# Patient Record
Sex: FEMALE | Race: WHITE | NOT HISPANIC OR LATINO | Employment: OTHER | URBAN - METROPOLITAN AREA
[De-identification: names, ages, dates, MRNs, and addresses within clinical notes are randomized per-mention and may not be internally consistent; named-entity substitution may affect disease eponyms.]

---

## 2017-01-26 DIAGNOSIS — K64.9 BLEEDING HEMORRHOID: ICD-10-CM

## 2017-01-26 DIAGNOSIS — G43.009 MIGRAINE WITHOUT AURA AND WITHOUT STATUS MIGRAINOSUS, NOT INTRACTABLE: ICD-10-CM

## 2017-01-26 DIAGNOSIS — E89.0 POSTOPERATIVE HYPOTHYROIDISM: ICD-10-CM

## 2017-01-26 DIAGNOSIS — N95.2 VAGINAL ATROPHY: ICD-10-CM

## 2017-01-26 DIAGNOSIS — E78.5 HYPERLIPIDEMIA, UNSPECIFIED HYPERLIPIDEMIA TYPE: ICD-10-CM

## 2017-01-26 RX ORDER — LEVOTHYROXINE SODIUM 0.07 MG/1
75 TABLET ORAL
Qty: 90 TAB | Refills: 3 | Status: SHIPPED | OUTPATIENT
Start: 2017-01-26 | End: 2017-12-08 | Stop reason: SDUPTHER

## 2017-01-26 RX ORDER — SIMVASTATIN 20 MG
20 TABLET ORAL EVERY EVENING
Qty: 90 TAB | Refills: 3 | Status: SHIPPED | OUTPATIENT
Start: 2017-01-26 | End: 2017-08-16 | Stop reason: SDUPTHER

## 2017-01-26 RX ORDER — SUMATRIPTAN 50 MG/1
50 TABLET, FILM COATED ORAL
Qty: 10 TAB | Refills: 3 | Status: SHIPPED | OUTPATIENT
Start: 2017-01-26 | End: 2018-01-24 | Stop reason: SDUPTHER

## 2017-01-26 RX ORDER — ESTRADIOL 0.1 MG/G
1 CREAM VAGINAL
Qty: 42.5 G | Refills: 1 | Status: SHIPPED | OUTPATIENT
Start: 2017-01-26 | End: 2018-01-24

## 2017-03-02 ENCOUNTER — OFFICE VISIT (OUTPATIENT)
Dept: MEDICAL GROUP | Age: 67
End: 2017-03-02
Payer: COMMERCIAL

## 2017-03-02 VITALS
OXYGEN SATURATION: 93 % | SYSTOLIC BLOOD PRESSURE: 108 MMHG | BODY MASS INDEX: 23.57 KG/M2 | TEMPERATURE: 97.8 F | HEART RATE: 71 BPM | HEIGHT: 63 IN | DIASTOLIC BLOOD PRESSURE: 60 MMHG | WEIGHT: 133 LBS

## 2017-03-02 DIAGNOSIS — G43.009 MIGRAINE WITHOUT AURA AND WITHOUT STATUS MIGRAINOSUS, NOT INTRACTABLE: ICD-10-CM

## 2017-03-02 DIAGNOSIS — Z23 NEED FOR PNEUMOCOCCAL VACCINATION: ICD-10-CM

## 2017-03-02 DIAGNOSIS — E89.0 POSTOPERATIVE HYPOTHYROIDISM: ICD-10-CM

## 2017-03-02 DIAGNOSIS — E78.00 PURE HYPERCHOLESTEROLEMIA: ICD-10-CM

## 2017-03-02 DIAGNOSIS — K64.9 BLEEDING HEMORRHOID: ICD-10-CM

## 2017-03-02 DIAGNOSIS — F33.42 RECURRENT MAJOR DEPRESSIVE DISORDER, IN FULL REMISSION (HCC): ICD-10-CM

## 2017-03-02 PROCEDURE — 90670 PCV13 VACCINE IM: CPT | Performed by: INTERNAL MEDICINE

## 2017-03-02 PROCEDURE — 99214 OFFICE O/P EST MOD 30 MIN: CPT | Mod: 25 | Performed by: INTERNAL MEDICINE

## 2017-03-02 PROCEDURE — 90471 IMMUNIZATION ADMIN: CPT | Performed by: INTERNAL MEDICINE

## 2017-03-02 ASSESSMENT — PAIN SCALES - GENERAL: PAINLEVEL: NO PAIN

## 2017-03-02 NOTE — MR AVS SNAPSHOT
"        Radha Land   3/2/2017 4:20 PM   Office Visit   MRN: [id number]    Department:  [address] [place] [address]   Dept Phone:  [phone]    Description:  Female : 1950   Provider:  Clarisse Toro M.D.           Reason for Visit     Follow-Up lab review from pap      Allergies as of 3/2/2017     No Known Allergies      You were diagnosed with     Postoperative hypothyroidism   [475935]       Pure hypercholesterolemia   [272.0.ICD-9-CM]       Recurrent major depressive disorder, in full remission (CMS-[facility])   [2396497]       Migraine without aura and without status migrainosus, not intractable   [817694]       Need for pneumococcal vaccination   [806828]         Vital Signs     Blood Pressure Pulse Temperature Height Weight Body Mass Index    108/60 mmHg 71 36.6 °C (97.8 °F) 1.6 m (5' 3\") 60.328 kg (133 lb) 23.57 kg/m2    Oxygen Saturation Smoking Status                93% Former Smoker          Basic Information     Date Of Birth Sex Race Ethnicity Preferred Language    1950 Female White Unknown English      Your appointments     2017  9:20 AM   Established Patient with Clarisse Toro M.D.   [address] [address] [phone]           You will be receiving a confirmation call a few days before your appointment from our automated call confirmation system.              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   2014 - Present    Migraine without aura and without status migrainosus, not intractable G43.009   2015 - Present    Osteopenia M85.80   2015 - Present    Bleeding hemorrhoid K64.9   3/1/2016 - Present    Recurrent major depressive disorder, in full remission (CMS-HCC) F33.42   2016 - Present      Health Maintenance       " Date Due Completion Dates    IMM ZOSTER VACCINE 1/10/2009 ---    IMM PNEUMOCOCCAL 65+ (ADULT) LOW/MEDIUM RISK SERIES (2 of 2 - PCV13) 9/2/2015 9/2/2014    COLONOSCOPY 11/15/2017 11/15/2007    MAMMOGRAM 12/2/2017 12/2/2016, 11/11/2015, 10/7/2014, 7/30/2013, 7/27/2012, 7/18/2011, 7/14/2010, 7/14/2010, 4/14/2009, 4/14/2009, 4/10/2008, 4/10/2008, 9/12/2007, 9/12/2007, 3/14/2007, 3/5/2007, 2/16/2006    BONE DENSITY 11/11/2020 11/11/2015, 7/14/2010    IMM DTaP/Tdap/Td Vaccine (2 - Td) 9/2/2024 9/2/2014            Current Immunizations     13-VALENT PCV PREVNAR  Incomplete    Influenza Vaccine Adult HD 11/11/2015    Influenza Vaccine Quad Inj (Pf) 11/20/2016    Pneumococcal polysaccharide vaccine (PPSV-23) 9/2/2014    Tdap Vaccine 9/2/2014      Below and/or attached are the medications your provider expects you to take. Review all of your home medications and newly ordered medications with your provider and/or pharmacist. Follow medication instructions as directed by your provider and/or pharmacist. Please keep your medication list with you and share with your provider. Update the information when medications are discontinued, doses are changed, or new medications (including over-the-counter products) are added; and carry medication information at all times in the event of emergency situations     Allergies:  No Known Allergies          Medications  Valid as of: March 02, 2017 -  5:17 PM    Generic Name Brand Name Tablet Size Instructions for use    Ascorbic Acid (Tab) ascorbic acid 500 MG Take 1,000 mg by mouth every day.        Calcium Carb-Cholecalciferol   Take  by mouth every day.        Cholecalciferol (Tab) cholecalciferol 1000 UNIT Take 2,000 Units by mouth every day.        Coenzyme Q10-Levocarnitine   Take  by mouth.        Docusate Sodium (Cap) COLACE 250 MG Take 250 mg by mouth every day.        Estradiol (Cream) ESTRACE 0.1 MG/GM Insert 1 g in vagina every 3 days.        Hydrocortisone (Cream) hydrocortisone  2.5 % Apply to anus after bowel movement once a day as needed        Levothyroxine Sodium (Tab) SYNTHROID 75 MCG Take 1 Tab by mouth Every morning on an empty stomach.        Multiple Vitamins-Minerals   Take  by mouth.        Probiotic Product   Take  by mouth.        Simvastatin (Tab) ZOCOR 20 MG Take 1 Tab by mouth every evening.        SUMAtriptan Succinate (Tab) IMITREX 50 MG Take 1 Tab by mouth Once PRN.        .                 Medicines prescribed today were sent to:     [id number] - [place], [place] - [address]    [address] [place] [address]    Phone: [phone] Fax: [phone]    Open 24 Hours?: No    [facility] - [place], [place] - [address]    [address] [address]    Phone: [phone] Fax: [phone]    Open 24 Hours?: No      Medication refill instructions:       If your prescription bottle indicates you have medication refills left, it is not necessary to call your provider’s office. Please contact your pharmacy and they will refill your medication.    If your prescription bottle indicates you do not have any refills left, you may request refills at any time through one of the following ways: The online [employer] system (except Urgent Care), by calling your provider’s office, or by asking your pharmacy to contact your provider’s office with a refill request. Medication refills are processed only during regular business hours and may not be available until the next business day. Your provider may request additional information or to have a follow-up visit with you prior to refilling your medication.   *Please Note: Medication refills are assigned a new Rx number when refilled electronically. Your pharmacy may indicate that no refills were authorized even though a new prescription for the same medication is available at the pharmacy. Please request the medicine by name with the pharmacy before contacting your provider for a  refill.           MyChart Access Code: Activation code not generated  Current Clipmarks Status: Active

## 2017-03-03 NOTE — PROGRESS NOTES
Subjective:   Radha Land is a 67 y.o. female here today for evaluation and management of:      Postoperative hypothyroidism  Patient reports that she is taking levothyroxine 75 µg every morning. She denies side effects from taking levothyroxine. She is in euthyroid state. Last thyroid function test in December 2016 was normal.    Results for RADHA LAND (MRN [id number]) as of 3/2/2017 21:28   Ref. Range 12/12/2016 06:59   TSH Latest Ref Range: 0.300-3.700 uIU/mL 1.970   Free T-4 Latest Ref Range: 0.53-1.43 ng/dL 1.31       Pure hypercholesterolemia  Patient is taking simvastatin 20 mg every evening. She tolerates simvastatin without side effects. Last lipid panel show  in December 2016. She has normal liver enzymes on 12/12/16.    Results for RADHA LAND (MRN [id number]) as of 3/2/2017 21:28   Ref. Range 12/12/2016 06:59   Cholesterol,Tot Latest Ref Range: 100-199 mg/dL 197   Triglycerides Latest Ref Range: 0-149 mg/dL 52   HDL Latest Ref Range: >=40 mg/dL 82   LDL Latest Ref Range: <100 mg/dL 105 (H)       Migraine without aura and without status migrainosus, not intractable  Her headache has been stable. She only states sumatriptan once in a while when she has migraine headache. She denied side effects from taking sumatriptan.    Bleeding hemorrhoid  Patient reported intermittent bleeding from hemorrhoid. She has hemorrhoid banding for 4 times with GI consult and Dr. You. She is going to follow-up with GI consultant next week for repeat colonoscopy and management of hemorrhoid. She is taking Colace for bowel movement.    Recurrent major depressive disorder, in full remission (CMS-HCC)  Patient reported that she had first episode of depression in 2013 as her  father-in-law was missing and she and her  struggled to looking for him. She was treated with Prozac for 6 month in 2013. She stopped taking Prozac as her mood improved. She has recurrent symptoms in November 2016 due  "to election. She was prescribed Effexor, but she never took it as she concerned the potential side effect of Effexor. Her mood improved and was back to baseline since then. She does not want to take any medication as she does not feel depressed. She is happy with her life currently. She denies suicidal ideation or plan or homicidal ideation or plan.         Current medicines (including changes today)  Current Outpatient Prescriptions   Medication Sig Dispense Refill   • simvastatin (ZOCOR) 20 MG Tab Take 1 Tab by mouth every evening. 90 Tab 3   • levothyroxine (SYNTHROID) 75 MCG Tab Take 1 Tab by mouth Every morning on an empty stomach. 90 Tab 3   • sumatriptan (IMITREX) 50 MG Tab Take 1 Tab by mouth Once PRN. 10 Tab 3   • hydrocortisone 2.5 % Cream topical cream Apply to anus after bowel movement once a day as needed 1 Tube 1   • estradiol (ESTRACE) 0.1 MG/GM vaginal cream Insert 1 g in vagina every 3 days. 42.5 g 1   • Probiotic Product (PROBIOTIC ADVANCED PO) Take  by mouth.     • ascorbic acid (ASCORBIC ACID) 500 MG Tab Take 1,000 mg by mouth every day.     • vitamin D (CHOLECALCIFEROL) 1000 UNIT Tab Take 2,000 Units by mouth every day.     • Calcium Carb-Cholecalciferol (CALCIUM 1000 + D PO) Take  by mouth every day.     • Multiple Vitamins-Minerals (MULTIVITAMIN PO) Take  by mouth.     • Coenzyme Q10-Levocarnitine (CO Q-10 PLUS PO) Take  by mouth.     • docusate sodium (COLACE) 250 MG capsule Take 250 mg by mouth every day.       No current facility-administered medications for this visit.     She  has a past medical history of Hyperlipidemia; Thyroid disease; and Migraine.    ROS   No chest pain, no shortness of breath, no abdominal pain       Objective:     Blood pressure 108/60, pulse 71, temperature 36.6 °C (97.8 °F), height 1.6 m (5' 3\"), weight 60.328 kg (133 lb), SpO2 93 %. Body mass index is 23.57 kg/(m^2).   Physical Exam:  General: Alert, oriented and no acute distress.  Eye contact is good, speech " goal directed, affect calm  HEENT: conjunctiva non-injected, sclera non-icteric.  Oral mucous membranes pink and moist with no lesions.  Pinna normal.   Lungs: Normal respiratory effort, clear to auscultation bilaterally with good excursion.  CV: regular rate and rhythm. No murmurs.   Abdomen: soft, non distended, nontender, Bowel sound normal.  Ext: no edema, color normal, vascularity normal, temperature normal        Assessment and Plan:   The following treatment plan was discussed     1. Postoperative hypothyroidism  - Well-controlled. Continue current regimens. Recheck lab 1-2 weeks before next follow up visit.  - Recommend to take levothyroxine every morning with empty stomach    2. Pure hypercholesterolemia  - Well-controlled. Continue simvastatin 20 mg every evening.  - Advised to eat low fat, low carbohydrate and high fiber diet as well as do cardio physical exercise regularly.   - Reprinted Blood tests ordered for patient. She is advised to do fasting blood test a week before next follow-up.    3. Recurrent major depressive disorder, in full remission (CMS-HCC)  - In full remission. Counseling for coping stress and relaxation technique.    4. Migraine without aura and without status migrainosus, not intractable  - Stable and well-controlled. Continue sumatriptan as needed. Denies side effects from taking sumatriptan.    5. Bleeding hemorrhoid  - She still has intermittent bleeding from hemorrhoid. She will follow up with GI consultant next week for hemorrhoid and she will repeat colonoscopy as well.    6. Need for pneumococcal vaccination  - Prevnar 13 vaccine was given today after reviewing risks and benefits as well as side effects of vaccine.  - PNEUMOCOCCAL CONJUGATE VACCINE 13-VALENT        Health Maintenance: Reviewed recent Pap smear report with patient. She had normal Pap smear on 12/20/16. She has vaginal atrophy. She is using KY gel. She has Estrace vaginal cream which she uses very rarely. She  reported that she uses Estrace vaginal cream only when she has sexual activity with her . She has normal mammogram on 12/2/16. She will have colonoscopy with GI consultant this year. Patient reported that she already received shingles vaccine at the prior PCP office.     Followup: Return in about 3 months (around 6/2/2017), or if symptoms worsen or fail to improve, for hypothyroid, hypercholesterolemia, migraine, osteopenia, lab review.      Please note that this dictation was created using voice recognition software. I have made every reasonable attempt to correct obvious errors, but I expect that there may have unintended errors in text, spelling, punctuation, or grammar that I did not discover.

## 2017-03-03 NOTE — ASSESSMENT & PLAN NOTE
Patient reports that she is taking levothyroxine 75 µg every morning. She denies side effects from taking levothyroxine. She is in euthyroid state. Last thyroid function test in December 2016 was normal.    Results for DAPHNE LOPEZ (MRN [id number]) as of 3/2/2017 21:28   Ref. Range 12/12/2016 06:59   TSH Latest Ref Range: 0.300-3.700 uIU/mL 1.970   Free T-4 Latest Ref Range: 0.53-1.43 ng/dL 1.31

## 2017-03-03 NOTE — ASSESSMENT & PLAN NOTE
Patient is taking simvastatin 20 mg every evening. She tolerates simvastatin without side effects. Last lipid panel show  in December 2016. She has normal liver enzymes on 12/12/16.    Results for DAPHNE LOPEZ (MRN [id number]) as of 3/2/2017 21:28   Ref. Range 12/12/2016 06:59   Cholesterol,Tot Latest Ref Range: 100-199 mg/dL 197   Triglycerides Latest Ref Range: 0-149 mg/dL 52   HDL Latest Ref Range: >=40 mg/dL 82   LDL Latest Ref Range: <100 mg/dL 105 (H)

## 2017-03-03 NOTE — ASSESSMENT & PLAN NOTE
Patient reported that she had first episode of depression in 2013 as her  father-in-law was missing and she and her  struggled to looking for him. She was treated with Prozac for 6 month in 2013. She stopped taking Prozac as her mood improved. She has recurrent symptoms in November 2016 due to election. She was prescribed Effexor, but she never took it as she concerned the potential side effect of Effexor. Her mood improved and was back to baseline since then. She does not want to take any medication as she does not feel depressed. She is happy with her life currently. She denies suicidal ideation or plan or homicidal ideation or plan.

## 2017-03-03 NOTE — ASSESSMENT & PLAN NOTE
Patient reported intermittent bleeding from hemorrhoid. She has hemorrhoid banding for 4 times with GI consult and Dr. You. She is going to follow-up with GI consultant next week for repeat colonoscopy and management of hemorrhoid. She is taking Colace for bowel movement.

## 2017-03-03 NOTE — ASSESSMENT & PLAN NOTE
Her headache has been stable. She only states sumatriptan once in a while when she has migraine headache. She denied side effects from taking sumatriptan.

## 2017-03-04 ENCOUNTER — HOSPITAL ENCOUNTER (EMERGENCY)
Facility: MEDICAL CENTER | Age: 67
End: 2017-03-04
Attending: EMERGENCY MEDICINE
Payer: COMMERCIAL

## 2017-03-04 VITALS
BODY MASS INDEX: 24.02 KG/M2 | HEART RATE: 73 BPM | DIASTOLIC BLOOD PRESSURE: 70 MMHG | RESPIRATION RATE: 16 BRPM | TEMPERATURE: 98.3 F | WEIGHT: 135.58 LBS | SYSTOLIC BLOOD PRESSURE: 148 MMHG | HEIGHT: 63 IN | OXYGEN SATURATION: 98 %

## 2017-03-04 DIAGNOSIS — H10.211 CHEMICAL CONJUNCTIVITIS, RIGHT: ICD-10-CM

## 2017-03-04 PROCEDURE — 700101 HCHG RX REV CODE 250

## 2017-03-04 PROCEDURE — 99284 EMERGENCY DEPT VISIT MOD MDM: CPT

## 2017-03-04 RX ORDER — POLYMYXIN B SULFATE AND TRIMETHOPRIM 1; 10000 MG/ML; [USP'U]/ML
2 SOLUTION OPHTHALMIC EVERY 4 HOURS
Qty: 1 BOTTLE | Refills: 0 | Status: SHIPPED | OUTPATIENT
Start: 2017-03-04 | End: 2017-06-13

## 2017-03-04 RX ORDER — TROPICAMIDE 10 MG/ML
SOLUTION/ DROPS OPHTHALMIC
Status: COMPLETED
Start: 2017-03-04 | End: 2017-03-04

## 2017-03-04 RX ORDER — HYDROCODONE BITARTRATE AND ACETAMINOPHEN 5; 325 MG/1; MG/1
1-2 TABLET ORAL EVERY 6 HOURS PRN
Qty: 10 TAB | Refills: 0 | Status: SHIPPED | OUTPATIENT
Start: 2017-03-04 | End: 2017-06-13

## 2017-03-04 RX ORDER — TETRACAINE HYDROCHLORIDE 5 MG/ML
SOLUTION OPHTHALMIC
Status: COMPLETED
Start: 2017-03-04 | End: 2017-03-04

## 2017-03-04 RX ADMIN — TETRACAINE HYDROCHLORIDE: 5 SOLUTION OPHTHALMIC at 08:15

## 2017-03-04 ASSESSMENT — PAIN SCALES - GENERAL: PAINLEVEL_OUTOF10: 7

## 2017-03-04 NOTE — ED AVS SNAPSHOT
Home Care Instructions                                                                                                                Radha Land   MRN: [id number]    Department:  [facility], Emergency Dept   Date of Visit:  3/4/2017            [facility], Emergency Dept    [address]    [place] [address]    Phone:  [phone]      You were seen by     Osvaldo Cox M.D.      Your Diagnosis Was     Chemical conjunctivitis, right     H10.211       Follow-up Information     1. Follow up with Clarisse Toro M.D.. Schedule an appointment as soon as possible for a visit today.    Specialty:  Internal Medicine    Contact information    [address] [id number] [phone]        Medication Information     Review all of your home medications and newly ordered medications with your primary doctor and/or pharmacist as soon as possible. Follow medication instructions as directed by your doctor and/or pharmacist.     Please keep your complete medication list with you and share with your physician. Update the information when medications are discontinued, doses are changed, or new medications (including over-the-counter products) are added; and carry medication information at all times in the event of emergency situations.               Medication List      START taking these medications        Instructions    polymixin-trimethoprim 27918-1.1 UNIT/ML-% Soln   Commonly known as:  POLYTRIM    Place 2 Drops in right eye every 4 hours.   Dose:  2 Drop         ASK your doctor about these medications        Instructions    ascorbic acid 500 MG Tabs   Commonly known as:  ascorbic acid    Take 1,000 mg by mouth every day.   Dose:  1000 mg       CALCIUM 1000 + D PO    Take  by mouth every day.       CO Q-10 PLUS PO    Take  by mouth.       docusate sodium 250 MG capsule   Commonly known as:  COLACE    Take 250 mg by mouth every day.   Dose:  250 mg       estradiol 0.1 MG/GM vaginal cream   Commonly known as:  ESTRACE    Insert 1 g in vagina every 3 days.   Dose:  1 g       hydrocortisone 2.5 % Crea topical cream    Apply to anus after bowel movement once a day as needed       levothyroxine 75 MCG Tabs   Commonly known as:  SYNTHROID    Take 1 Tab by mouth Every morning on an empty stomach.   Dose:  75 mcg       MULTIVITAMIN PO    Take  by mouth.       PROBIOTIC ADVANCED PO    Take  by mouth.       simvastatin 20 MG Tabs   Commonly known as:  ZOCOR    Take 1 Tab by mouth every evening.   Dose:  20 mg       sumatriptan 50 MG Tabs   Commonly known as:  IMITREX    Take 1 Tab by mouth Once PRN.   Dose:  50 mg       vitamin D 1000 UNIT Tabs   Commonly known as:  cholecalciferol    Take 2,000 Units by mouth every day.   Dose:  2000 Units               Procedures and tests performed during your visit     NURSING COMMUNICATION        Discharge Instructions       Chemical Conjunctivitis  Chemical conjunctivitis is eye inflammation from exposure to an irritant or chemical substance. This causes the clear membrane that covers the white part of your eye and the inner surface of your eyelid (conjunctiva) to become inflamed. When the blood vessels in the conjunctiva become inflamed, the eye may become red, pink, and itchy.  Chemical conjunctivitis can occur in one or both eyes. It cannot be spread by one person to another person (noncontagious).  CAUSES  This condition is caused by exposure to a chemical substance or irritant, such as:  · Smoke.  · Chlorine.  · Soap.  · Fumes.  · Air pollution.  RISK FACTORS  This condition is more likely to develop in:  · People who live somewhere with high levels of air pollution.  · People who use swimming pools often.  SYMPTOMS  Symptoms of this condition may include:  · Eye redness.  · Tearing of the eyes.  · Watery eyes.  · Itchy eyes.  · Burning feeling in the eyes.  · Clear drainage from the eyes.  · Swollen eyelids.  · Sensitivity to  light.  DIAGNOSIS  Your health care provider can diagnose this condition from your symptoms and medical history. The health care provider will also do a physical exam. If you have drainage from your eyes, it may be tested to rule out other causes of conjunctivitis. Your health care provider may also use a medical instrument that uses magnified light to examine the eyes (slit lamp).   TREATMENT  Treatment for this condition involves carefully flushing the chemical out of your eye. You may also get antiallergy medicines or eye drops to use at home.  HOME CARE INSTRUCTIONS  · Take or apply medicines only as directed by your health care provider.  · Do not touch or rub your eyes.  · Do not wear contact lenses until the inflammation is gone. Wear glasses instead.  · Do not wear eye makeup until the inflammation is gone.  · Apply a cool, clean washcloth to your eye for 10-20 minutes, 3-4 times a day.  · Avoid exposure to the chemical or environment that caused the irritation. Wear eye protection as necessary.  SEEK MEDICAL CARE IF:  · Your symptoms get worse.  · You have pus draining from your eye.  · You have new symptoms.  · You have a fever.  · You have a change in vision.  · You have increasing pain.     This information is not intended to replace advice given to you by your health care provider. Make sure you discuss any questions you have with your health care provider.     Document Released: 09/27/2006 Document Revised: 01/08/2016 Document Reviewed: 09/29/2015  [employer] Interactive Patient Education ©2016 [employer]  Return if fever, vomiting or if no better in 12 hours.Return if fever, vomiting or if no better in 12 hours.Warm soaks to eyes 10 minutes every two hours to be followed by antibiotic drops. Dark glasses.  Return if no better 12 hours.          Patient Information     Patient Information    Following emergency treatment: all patient requiring follow-up care must return either to a private physician  or a clinic if your condition worsens before you are able to obtain further medical attention, please return to the emergency room.     Billing Information    At [facility], we work to make the billing process streamlined for our patients.  Our Representatives are here to answer any questions you may have regarding your hospital bill.  If you have insurance coverage and have supplied your insurance information to us, we will submit a claim to your insurer on your behalf.  Should you have any questions regarding your bill, we can be reached online or by phone as follows:  Online: You are able pay your bills online or live chat with our representatives about any billing questions you may have. We are here to help Monday - Friday from 8:00am to 7:30pm and 9:00am - 12:00pm on Saturdays.  Please visit [email or web address]  for more information.   Phone:  [phone] or [phone]    Please note that your emergency physician, surgeon, pathologist, radiologist, anesthesiologist, and other specialists are not employed by [facility] and will therefore bill separately for their services.  Please contact them directly for any questions concerning their bills at the numbers below:     Emergency Physician Services:  [phone]  [facility]:  [phone]  [facility]:  [phone]  [facility]:  [phone]    1. Your final bill may vary from the amount quoted upon discharge if all procedures are not complete at that time, or if your doctor has additional procedures of which we are not aware. You will receive an additional bill if you return to the Emergency Department at [facility] for suture removal regardless of the facility of which the sutures were placed.     2. Please arrange for settlement of this account at the emergency registration.    3. All self-pay accounts are due in full at the time of treatment.  If you are unable to  meet this obligation then payment is expected within 4-5 days.     4. If you have had radiology studies (CT, X-ray, Ultrasound, MRI), you have received a preliminary result during your emergency department visit. Please contact the radiology department [phone] to receive a copy of your final result. Please discuss the Final result with your primary physician or with the follow up physician provided.     Crisis Hotline:  [place] Crisis Hotline:  5-671-WWUUHXW or 1-233.533.3536  [place] Crisis Hotline:    1-910.418.4906 or 561-970-4339         ED Discharge Follow Up Questions    1. In order to provide you with very good care, we would like to follow up with a phone call in the next few days.  May we have your permission to contact you?     YES /  NO    2. What is the best phone number to call you? (       )_____-__________    3. What is the best time to call you?      Morning  /  Afternoon  /  Evening                   Patient Signature:  ____________________________________________________________    Date:  ____________________________________________________________      Your appointments     Jun 13, 2017  9:20 AM   Established Patient with Clarisse Toro M.D.   [address] ([facility])    [address] [place] [id number] [phone]           You will be receiving a confirmation call a few days before your appointment from our automated call confirmation system.

## 2017-03-04 NOTE — ED PROVIDER NOTES
"ED Provider Note    CHIEF COMPLAINT  Chief Complaint   Patient presents with   • Eye Pain   • Red Eye       HPI  Radha Land is a 67 y.o. female who presents with history of accidentally putting a alcohol solution in her eye this morning. Evidently this solution as mentioned for the ear to clean the cerumen from the ear she accidentally placed drops in her right eye, causing immediate irritation of the right eye.    REVIEW OF SYSTEMS  See HPI for further details.     PAST MEDICAL HISTORY  Past Medical History   Diagnosis Date   • Hyperlipidemia    • Thyroid disease    • Migraine          SOCIAL HISTORY        CURRENT MEDICATIONS  Home Medications     **Home medications have not yet been reviewed for this encounter**          ALLERGIES  No Known Allergies    PHYSICAL EXAM  VITAL SIGNS: /73 mmHg  Pulse 71  Temp(Src) 36.8 °C (98.3 °F)  Resp 16  Ht 1.6 m (5' 3\")  Wt 61.5 kg (135 lb 9.3 oz)  BMI 24.02 kg/m2  SpO2 94%  Constitutional: Well developed, Well nourished, No acute distress, Non-toxic appearance.   HENT: Normocephalic, Atraumatic, Bilateral external ears normal, Oropharynx moist, No oral exudates, Nose normal.   Eyes: PERRLA, EOMI, conjunctiva is injected, no discharge cornea normal otherwise, anterior chamber normal pupils and normal extraocular motions are normal limits are normal left eye is normal  Neck: Normal range of motion, No tenderness, Supple, No stridor.   Cardiovascular:  Heart sounds are normal no murmurs or rubs  T  Skin: Warm, Dry, No erythema, No rash.   Neurologic: Alert & oriented x 3, Normal motor function, Normal sensory function, No focal deficits noted.         COURSE & MEDICAL DECISION MAKING  Pertinent Labs & Imaging studies reviewed. (See chart for details)    She accidentally put a alcohol solution and her eye this morning, immediate irritation, redness that at home immediately.. Examination here reveals conjunctival injection. Tetracaine was used to anesthetize " the eye, and a liter of normal saline via Ronald lens to irrigate the eye. We have checked with Poison Control Center. It evidently alcohol his isopropyl alcohol. Instructions were to irrigate. She will be discharged with Polytrim drops.   . Evidently according to poison control center she may have also placed some boric acid to worsen at the original isopropyl alcohol. Poison control reference number [id number]  FINAL IMPRESSION  1.  1. Chemical conjunctivitis, right        2.   3.    Disposition:  Discharge instructions are understood. This patient is to return if fever vomiting or no better in 12 hours. Follow up with the [facility] clinic or private physician. Information sheets on chemical conjunctivitis    Electronically signed by: Osvaldo Cox, 3/4/2017 8:20 AM

## 2017-03-04 NOTE — DISCHARGE INSTRUCTIONS
Chemical Conjunctivitis  Chemical conjunctivitis is eye inflammation from exposure to an irritant or chemical substance. This causes the clear membrane that covers the white part of your eye and the inner surface of your eyelid (conjunctiva) to become inflamed. When the blood vessels in the conjunctiva become inflamed, the eye may become red, pink, and itchy.  Chemical conjunctivitis can occur in one or both eyes. It cannot be spread by one person to another person (noncontagious).  CAUSES  This condition is caused by exposure to a chemical substance or irritant, such as:  · Smoke.  · Chlorine.  · Soap.  · Fumes.  · Air pollution.  RISK FACTORS  This condition is more likely to develop in:  · People who live somewhere with high levels of air pollution.  · People who use swimming pools often.  SYMPTOMS  Symptoms of this condition may include:  · Eye redness.  · Tearing of the eyes.  · Watery eyes.  · Itchy eyes.  · Burning feeling in the eyes.  · Clear drainage from the eyes.  · Swollen eyelids.  · Sensitivity to light.  DIAGNOSIS  Your health care provider can diagnose this condition from your symptoms and medical history. The health care provider will also do a physical exam. If you have drainage from your eyes, it may be tested to rule out other causes of conjunctivitis. Your health care provider may also use a medical instrument that uses magnified light to examine the eyes (slit lamp).   TREATMENT  Treatment for this condition involves carefully flushing the chemical out of your eye. You may also get antiallergy medicines or eye drops to use at home.  HOME CARE INSTRUCTIONS  · Take or apply medicines only as directed by your health care provider.  · Do not touch or rub your eyes.  · Do not wear contact lenses until the inflammation is gone. Wear glasses instead.  · Do not wear eye makeup until the inflammation is gone.  · Apply a cool, clean washcloth to your eye for 10-20 minutes, 3-4 times a day.  · Avoid  exposure to the chemical or environment that caused the irritation. Wear eye protection as necessary.  SEEK MEDICAL CARE IF:  · Your symptoms get worse.  · You have pus draining from your eye.  · You have new symptoms.  · You have a fever.  · You have a change in vision.  · You have increasing pain.     This information is not intended to replace advice given to you by your health care provider. Make sure you discuss any questions you have with your health care provider.     Document Released: 09/27/2006 Document Revised: 01/08/2016 Document Reviewed: 09/29/2015  [employer] Interactive Patient Education ©2016 [employer]  Return if fever, vomiting or if no better in 12 hours.Return if fever, vomiting or if no better in 12 hours.Warm soaks to eyes 10 minutes every two hours to be followed by antibiotic drops. Dark glasses.  Return if no better 12 hours.

## 2017-06-02 ENCOUNTER — HOSPITAL ENCOUNTER (OUTPATIENT)
Dept: LAB | Facility: MEDICAL CENTER | Age: 67
End: 2017-06-02
Attending: INTERNAL MEDICINE
Payer: COMMERCIAL

## 2017-06-02 DIAGNOSIS — M85.80 OSTEOPENIA: ICD-10-CM

## 2017-06-02 DIAGNOSIS — E78.00 PURE HYPERCHOLESTEROLEMIA: ICD-10-CM

## 2017-06-02 DIAGNOSIS — Z11.59 NEED FOR HEPATITIS C SCREENING TEST: ICD-10-CM

## 2017-06-02 DIAGNOSIS — K64.9 BLEEDING HEMORRHOID: ICD-10-CM

## 2017-06-02 DIAGNOSIS — E89.0 POSTOPERATIVE HYPOTHYROIDISM: ICD-10-CM

## 2017-06-02 LAB
25(OH)D3 SERPL-MCNC: 24 NG/ML (ref 30–100)
ALBUMIN SERPL BCP-MCNC: 3.8 G/DL (ref 3.2–4.9)
ALBUMIN/GLOB SERPL: 1.3 G/DL
ALP SERPL-CCNC: 64 U/L (ref 30–99)
ALT SERPL-CCNC: 17 U/L (ref 2–50)
ANION GAP SERPL CALC-SCNC: 7 MMOL/L (ref 0–11.9)
AST SERPL-CCNC: 19 U/L (ref 12–45)
BASOPHILS # BLD AUTO: 1 % (ref 0–1.8)
BASOPHILS # BLD: 0.05 K/UL (ref 0–0.12)
BILIRUB SERPL-MCNC: 0.4 MG/DL (ref 0.1–1.5)
BUN SERPL-MCNC: 15 MG/DL (ref 8–22)
CALCIUM SERPL-MCNC: 9.2 MG/DL (ref 8.5–10.5)
CHLORIDE SERPL-SCNC: 103 MMOL/L (ref 96–112)
CHOLEST SERPL-MCNC: 193 MG/DL (ref 100–199)
CO2 SERPL-SCNC: 26 MMOL/L (ref 20–33)
CREAT SERPL-MCNC: 0.9 MG/DL (ref 0.5–1.4)
EOSINOPHIL # BLD AUTO: 0.14 K/UL (ref 0–0.51)
EOSINOPHIL NFR BLD: 2.7 % (ref 0–6.9)
ERYTHROCYTE [DISTWIDTH] IN BLOOD BY AUTOMATED COUNT: 42.8 FL (ref 35.9–50)
GFR SERPL CREATININE-BSD FRML MDRD: >60 ML/MIN/1.73 M 2
GLOBULIN SER CALC-MCNC: 3 G/DL (ref 1.9–3.5)
GLUCOSE SERPL-MCNC: 88 MG/DL (ref 65–99)
HCT VFR BLD AUTO: 40.9 % (ref 37–47)
HCV AB SER QL: NEGATIVE
HDLC SERPL-MCNC: 83 MG/DL
HGB BLD-MCNC: 13.7 G/DL (ref 12–16)
IMM GRANULOCYTES # BLD AUTO: 0.02 K/UL (ref 0–0.11)
IMM GRANULOCYTES NFR BLD AUTO: 0.4 % (ref 0–0.9)
LDLC SERPL CALC-MCNC: 101 MG/DL
LYMPHOCYTES # BLD AUTO: 1.16 K/UL (ref 1–4.8)
LYMPHOCYTES NFR BLD: 22.6 % (ref 22–41)
MCH RBC QN AUTO: 31.2 PG (ref 27–33)
MCHC RBC AUTO-ENTMCNC: 33.5 G/DL (ref 33.6–35)
MCV RBC AUTO: 93.2 FL (ref 81.4–97.8)
MONOCYTES # BLD AUTO: 0.28 K/UL (ref 0–0.85)
MONOCYTES NFR BLD AUTO: 5.4 % (ref 0–13.4)
NEUTROPHILS # BLD AUTO: 3.49 K/UL (ref 2–7.15)
NEUTROPHILS NFR BLD: 67.9 % (ref 44–72)
NRBC # BLD AUTO: 0 K/UL
NRBC BLD AUTO-RTO: 0 /100 WBC
PLATELET # BLD AUTO: 260 K/UL (ref 164–446)
PMV BLD AUTO: 9.5 FL (ref 9–12.9)
POTASSIUM SERPL-SCNC: 4 MMOL/L (ref 3.6–5.5)
PROT SERPL-MCNC: 6.8 G/DL (ref 6–8.2)
RBC # BLD AUTO: 4.39 M/UL (ref 4.2–5.4)
SODIUM SERPL-SCNC: 136 MMOL/L (ref 135–145)
T4 FREE SERPL-MCNC: 1.46 NG/DL (ref 0.53–1.43)
TRIGL SERPL-MCNC: 47 MG/DL (ref 0–149)
TSH SERPL DL<=0.005 MIU/L-ACNC: 1.01 UIU/ML (ref 0.3–3.7)
WBC # BLD AUTO: 5.1 K/UL (ref 4.8–10.8)

## 2017-06-02 PROCEDURE — 84443 ASSAY THYROID STIM HORMONE: CPT

## 2017-06-02 PROCEDURE — 82306 VITAMIN D 25 HYDROXY: CPT

## 2017-06-02 PROCEDURE — 86803 HEPATITIS C AB TEST: CPT

## 2017-06-02 PROCEDURE — 80061 LIPID PANEL: CPT

## 2017-06-02 PROCEDURE — 36415 COLL VENOUS BLD VENIPUNCTURE: CPT

## 2017-06-02 PROCEDURE — 84439 ASSAY OF FREE THYROXINE: CPT

## 2017-06-02 PROCEDURE — 80053 COMPREHEN METABOLIC PANEL: CPT

## 2017-06-02 PROCEDURE — 85025 COMPLETE CBC W/AUTO DIFF WBC: CPT

## 2017-06-13 ENCOUNTER — OFFICE VISIT (OUTPATIENT)
Dept: MEDICAL GROUP | Age: 67
End: 2017-06-13
Payer: COMMERCIAL

## 2017-06-13 VITALS
BODY MASS INDEX: 22.71 KG/M2 | DIASTOLIC BLOOD PRESSURE: 68 MMHG | OXYGEN SATURATION: 97 % | TEMPERATURE: 98.4 F | WEIGHT: 133 LBS | HEART RATE: 73 BPM | SYSTOLIC BLOOD PRESSURE: 108 MMHG | HEIGHT: 64 IN

## 2017-06-13 DIAGNOSIS — E89.0 POSTOPERATIVE HYPOTHYROIDISM: ICD-10-CM

## 2017-06-13 DIAGNOSIS — M85.80 OSTEOPENIA WITH HIGH RISK OF FRACTURE: ICD-10-CM

## 2017-06-13 DIAGNOSIS — E78.00 PURE HYPERCHOLESTEROLEMIA: ICD-10-CM

## 2017-06-13 DIAGNOSIS — E55.9 VITAMIN D INSUFFICIENCY: ICD-10-CM

## 2017-06-13 DIAGNOSIS — K64.9 BLEEDING HEMORRHOID: ICD-10-CM

## 2017-06-13 PROCEDURE — 99214 OFFICE O/P EST MOD 30 MIN: CPT | Performed by: INTERNAL MEDICINE

## 2017-06-13 RX ORDER — ERGOCALCIFEROL 1.25 MG/1
50000 CAPSULE ORAL
Qty: 12 CAP | Refills: 1 | Status: SHIPPED | OUTPATIENT
Start: 2017-06-13 | End: 2018-01-24

## 2017-06-13 ASSESSMENT — PAIN SCALES - GENERAL: PAINLEVEL: NO PAIN

## 2017-06-13 NOTE — PROGRESS NOTES
Subjective:   Radha Land is a 67 y.o. female here today for evaluation and management of:      Vitamin D insufficiency  Patient is taking vitamin D 2000 units daily. However, her vitamin D level is not adequate yet. Recent vitamin D level was 24 on 6/2/17. She wants to try the prescription high-dose vitamin D.    Osteopenia with high risk of fracture  Patient has DEXA scan done on 11/11/2015 showed osteopenia with high risk of fracture. We discussed about treatment of osteoporosis. Patient still wants to postpone the treatment even though she understands the risks of fracture. She will try vitamin D and calcium supplement with weightbearing exercise.    Pure hypercholesterolemia  Stable. Currently taking  simvastatin 20 mg every evening as directed.  Denies side effects--no myalgias or abdominal pain.  Reports diet is more vegetables and fruits and fiber.  She is exercising regularly.  She is not taking ASA daily.  She denies dizziness, claudication, or chest pain.    Results for RADHA LAND (MRN [id number]) as of 6/13/2017 12:21   Ref. Range 6/2/2017 07:10   Cholesterol,Tot Latest Ref Range: 100-199 mg/dL 193   Triglycerides Latest Ref Range: 0-149 mg/dL 47   HDL Latest Ref Range: >=40 mg/dL 83   LDL Latest Ref Range: <100 mg/dL 101 (H)           Postoperative hypothyroidism  Patient is taking levothyroxine 75 µg every evening. Recent thyroid function test showed that she has slightly elevated free T4, and TSH within normal. She denies any signs and symptoms of hypo-or hyperthyroidism. We discussed to continue with the nursing with the same dose and reassess thyroid function testing future.    Results for RADHA LAND (MRN [id number]) as of 6/13/2017 12:21   Ref. Range 6/2/2017 07:10   TSH Latest Ref Range: 0.300-3.700 uIU/mL 1.010   Free T-4 Latest Ref Range: 0.53-1.43 ng/dL 1.46 (H)       Bleeding hemorrhoid  Patient reported that she still has bleeding from hemorrhoid off and on. She has  "rubber banding for 4 times in the past. She has follow-up appointment with GI consultant, Dr. You for colonoscopy and discuss hemorrhoid treatment again next week. She is using topical hemorrhoid foam prescribed by GI consultant. Her recent CBC on 6/2/17 did not show anemia.         Current medicines (including changes today)  Current Outpatient Prescriptions   Medication Sig Dispense Refill   • ergocalciferol (DRISDOL) 86441 UNIT capsule Take 1 Cap by mouth every 7 days. 12 Cap 1   • simvastatin (ZOCOR) 20 MG Tab Take 1 Tab by mouth every evening. 90 Tab 3   • levothyroxine (SYNTHROID) 75 MCG Tab Take 1 Tab by mouth Every morning on an empty stomach. 90 Tab 3   • sumatriptan (IMITREX) 50 MG Tab Take 1 Tab by mouth Once PRN. 10 Tab 3   • hydrocortisone 2.5 % Cream topical cream Apply to anus after bowel movement once a day as needed 1 Tube 1   • estradiol (ESTRACE) 0.1 MG/GM vaginal cream Insert 1 g in vagina every 3 days. 42.5 g 1   • Probiotic Product (PROBIOTIC ADVANCED PO) Take  by mouth.     • ascorbic acid (ASCORBIC ACID) 500 MG Tab Take 1,000 mg by mouth every day.     • vitamin D (CHOLECALCIFEROL) 1000 UNIT Tab Take 2,000 Units by mouth every day.     • Calcium Carb-Cholecalciferol (CALCIUM 1000 + D PO) Take  by mouth every day.     • Multiple Vitamins-Minerals (MULTIVITAMIN PO) Take  by mouth.     • Coenzyme Q10-Levocarnitine (CO Q-10 PLUS PO) Take  by mouth.     • docusate sodium (COLACE) 250 MG capsule Take 250 mg by mouth every day.       No current facility-administered medications for this visit.     She  has a past medical history of Hyperlipidemia; Thyroid disease; and Migraine.    ROS   No chest pain, no shortness of breath, no abdominal pain       Objective:     Blood pressure 108/68, pulse 73, temperature 36.9 °C (98.4 °F), height 1.626 m (5' 4\"), weight 60.328 kg (133 lb), SpO2 97 %, not currently breastfeeding. Body mass index is 22.82 kg/(m^2).   Physical Exam:  General: Alert, oriented " and no acute distress.  Eye contact is good, speech goal directed, affect calm  HEENT: conjunctiva non-injected, sclera non-icteric.  Oral mucous membranes pink and moist with no lesions.  Pinna normal.   Lungs: Normal respiratory effort, clear to auscultation bilaterally with good excursion.  CV: regular rate and rhythm. No murmurs.   Abdomen: soft, non distended, nontender, Bowel sound normal.  Ext: no edema, color normal, vascularity normal, temperature normal        Assessment and Plan:   The following treatment plan was discussed     1. Pure hypercholesterolemia  - Well-controlled. Continue current regimens. Recheck lab 1-2 weeks before next follow up visit.  - COMP METABOLIC PANEL; Future  - LIPID PROFILE; Future    2. Postoperative hypothyroidism  - Normal TSH , slightly elevated free T4. She is in euthyroid state, so we will continue levothyroxine 75 µg every morning. Will recheck thyroid function test in 6 month.  - TSH; Future  - FREE THYROXINE; Future    3. Vitamin D insufficiency  - Not well controlled. Prescribed ergocalciferol 50,000 units to take once a week.   - ergocalciferol (DRISDOL) 18992 UNIT capsule; Take 1 Cap by mouth every 7 days.  Dispense: 12 Cap; Refill: 1  - VITAMIN D,25 HYDROXY; Future    4. Osteopenia with high risk of fracture  - Discussed the bisphosphonate treatment with patient. Patient still wanted to continue to control with vitamin supplements and weightbearing exercise. She declined to be treated with medication.    5. Bleeding hemorrhoid  - Still has intermittent bleeding hemorrhoid. She is using topical hemorrhoid foam prescribed by GI consultant.  - Advised to increase water intake and fiber intake to prevent constipation.  - Discussed hemorrhoidectomy with colorectal surgeon. Patient stated that she has appointment with GI consultant next week to do Colonoscopy and will discuss hemorrhoid treatment with GI consultant first. She will see that she can do rubber banding first.  If her hemorrhoid is not able to be treated with banding, she will discuss with GI to refer to colorectal surgeon.  - CBC WITH DIFFERENTIAL; Future  - COMP METABOLIC PANEL; Future    6. Health Maintenance  -Patient stated that she already had shingles vaccine 3 years ago. She did not recall the exact date for shingles vaccine. She will have colonoscopy with GI consultant next week. She has normal mammogram on 12/2/2016.         Followup: Return if symptoms worsen or fail to improve, for hypothyroid, hyperlipidemia, osteopenia, vitamin D insufficiency, lab review.      Please note that this dictation was created using voice recognition software. I have made every reasonable attempt to correct obvious errors, but I expect that there may have unintended errors in text, spelling, punctuation, or grammar that I did not discover.

## 2017-06-13 NOTE — ASSESSMENT & PLAN NOTE
Patient is taking levothyroxine 75 µg every evening. Recent thyroid function test showed that she has slightly elevated free T4, and TSH within normal. She denies any signs and symptoms of hypo-or hyperthyroidism. We discussed to continue with the nursing with the same dose and reassess thyroid function testing future.    Results for DAPHNE LOPEZ (MRN [id number]) as of 6/13/2017 12:21   Ref. Range 6/2/2017 07:10   TSH Latest Ref Range: 0.300-3.700 uIU/mL 1.010   Free T-4 Latest Ref Range: 0.53-1.43 ng/dL 1.46 (H)

## 2017-06-13 NOTE — ASSESSMENT & PLAN NOTE
Patient has DEXA scan done on 11/11/2015 showed osteopenia with high risk of fracture. We discussed about treatment of osteoporosis. Patient still wants to postpone the treatment even though she understands the risks of fracture. She will try vitamin D and calcium supplement with weightbearing exercise.

## 2017-06-13 NOTE — MR AVS SNAPSHOT
"        Radha Land   2017 9:20 AM   Office Visit   MRN: [id number]    Department:  [address] [place] [address]   Dept Phone:  [phone]    Description:  Female : 1950   Provider:  Clarisse Toro M.D.           Reason for Visit     Hypothyroidism lab review      Allergies as of 2017     No Known Allergies      You were diagnosed with     Pure hypercholesterolemia   [272.0.ICD-9-CM]       Postoperative hypothyroidism   [110875]       Vitamin D insufficiency   [960817]       Bleeding hemorrhoid   [629309]         Vital Signs     Blood Pressure Pulse Temperature Height Weight Body Mass Index    108/68 mmHg 73 36.9 °C (98.4 °F) 1.626 m (5' 4\") 60.328 kg (133 lb) 22.82 kg/m2    Oxygen Saturation Breastfeeding? Smoking Status             97% No Former Smoker         Basic Information     Date Of Birth Sex Race Ethnicity Preferred Language    1950 Female White Non- English      Your appointments     2018  9:20 AM   Established Patient with Clarisse Toro M.D.   [address] [id number] [phone]           You will be receiving a confirmation call a few days before your appointment from our automated call confirmation system.              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   2014 - Present    Migraine without aura and without status migrainosus, not intractable G43.009   2015 - Present    Osteopenia with high risk of fracture M85.80   2015 - Present    Bleeding hemorrhoid K64.9   3/1/2016 - Present    Recurrent major depressive disorder, in full remission (CMS-HCC) F33.42   2016 - Present    Vitamin D insufficiency E55.9   2017 - Present      Health Maintenance        Date Due Completion Dates    IMM ZOSTER " VACCINE 1/10/2009 ---    COLONOSCOPY 11/15/2017 11/15/2007    MAMMOGRAM 12/2/2017 12/2/2016, 11/11/2015, 10/7/2014, 7/30/2013, 7/27/2012, 7/18/2011, 7/14/2010, 7/14/2010, 4/14/2009, 4/14/2009, 4/10/2008, 4/10/2008, 9/12/2007, 9/12/2007, 3/14/2007, 3/5/2007, 2/16/2006    BONE DENSITY 11/11/2020 11/11/2015, 7/14/2010    IMM DTaP/Tdap/Td Vaccine (2 - Td) 9/2/2024 9/2/2014            Current Immunizations     13-VALENT PCV PREVNAR 3/2/2017  5:19 PM    Influenza Vaccine Adult HD 11/11/2015    Influenza Vaccine Quad Inj (Pf) 11/20/2016    Pneumococcal polysaccharide vaccine (PPSV-23) 9/2/2014    Tdap Vaccine 9/2/2014      Below and/or attached are the medications your provider expects you to take. Review all of your home medications and newly ordered medications with your provider and/or pharmacist. Follow medication instructions as directed by your provider and/or pharmacist. Please keep your medication list with you and share with your provider. Update the information when medications are discontinued, doses are changed, or new medications (including over-the-counter products) are added; and carry medication information at all times in the event of emergency situations     Allergies:  No Known Allergies          Medications  Valid as of: June 13, 2017 -  9:54 AM    Generic Name Brand Name Tablet Size Instructions for use    Ascorbic Acid (Tab) ascorbic acid 500 MG Take 1,000 mg by mouth every day.        Calcium Carb-Cholecalciferol   Take  by mouth every day.        Cholecalciferol (Tab) cholecalciferol 1000 UNIT Take 2,000 Units by mouth every day.        Coenzyme Q10-Levocarnitine   Take  by mouth.        Docusate Sodium (Cap) COLACE 250 MG Take 250 mg by mouth every day.        Ergocalciferol (Cap) DRISDOL 41275 UNIT Take 1 Cap by mouth every 7 days.        Estradiol (Cream) ESTRACE 0.1 MG/GM Insert 1 g in vagina every 3 days.        Hydrocortisone (Cream) hydrocortisone 2.5 % Apply to anus after bowel movement once  a day as needed        Levothyroxine Sodium (Tab) SYNTHROID 75 MCG Take 1 Tab by mouth Every morning on an empty stomach.        Multiple Vitamins-Minerals   Take  by mouth.        Probiotic Product   Take  by mouth.        Simvastatin (Tab) ZOCOR 20 MG Take 1 Tab by mouth every evening.        SUMAtriptan Succinate (Tab) IMITREX 50 MG Take 1 Tab by mouth Once PRN.        .                 Medicines prescribed today were sent to:     [id number] - [place], [place] - [address]    [address] [place] [address]    Phone: [phone] Fax: [phone]    Open 24 Hours?: No    [facility] - [place], [place] - [address]    [address] [facility] [address]    Phone: [phone] Fax: [phone]    Open 24 Hours?: No      Medication refill instructions:       If your prescription bottle indicates you have medication refills left, it is not necessary to call your provider’s office. Please contact your pharmacy and they will refill your medication.    If your prescription bottle indicates you do not have any refills left, you may request refills at any time through one of the following ways: The online [employer] system (except Urgent Care), by calling your provider’s office, or by asking your pharmacy to contact your provider’s office with a refill request. Medication refills are processed only during regular business hours and may not be available until the next business day. Your provider may request additional information or to have a follow-up visit with you prior to refilling your medication.   *Please Note: Medication refills are assigned a new Rx number when refilled electronically. Your pharmacy may indicate that no refills were authorized even though a new prescription for the same medication is available at the pharmacy. Please request the medicine by name with the pharmacy before contacting your provider for a refill.        Your To Do List     Future  Labs/Procedures Complete By Expires    CBC WITH DIFFERENTIAL  As directed 6/14/2018    COMP METABOLIC PANEL  As directed 6/14/2018    FREE THYROXINE  As directed 6/14/2018    LIPID PROFILE  As directed 6/14/2018    TSH  As directed 6/14/2018    VITAMIN D,25 HYDROXY  As directed 6/14/2018         MyChart Access Code: Activation code not generated  Current [employer] Status: Active

## 2017-06-13 NOTE — ASSESSMENT & PLAN NOTE
Stable. Currently taking  simvastatin 20 mg every evening as directed.  Denies side effects--no myalgias or abdominal pain.  Reports diet is more vegetables and fruits and fiber.  She is exercising regularly.  She is not taking ASA daily.  She denies dizziness, claudication, or chest pain.    Results for DAPHNE LOPEZ (MRN [id number]) as of 6/13/2017 12:21   Ref. Range 6/2/2017 07:10   Cholesterol,Tot Latest Ref Range: 100-199 mg/dL 193   Triglycerides Latest Ref Range: 0-149 mg/dL 47   HDL Latest Ref Range: >=40 mg/dL 83   LDL Latest Ref Range: <100 mg/dL 101 (H)

## 2017-06-13 NOTE — ASSESSMENT & PLAN NOTE
Patient is taking vitamin D 2000 units daily. However, her vitamin D level is not adequate yet. Recent vitamin D level was 24 on 6/2/17. She wants to try the prescription high-dose vitamin D.

## 2017-06-13 NOTE — ASSESSMENT & PLAN NOTE
Patient reported that she still has bleeding from hemorrhoid off and on. She has rubber banding for 4 times in the past. She has follow-up appointment with GI consultant, Dr. You for colonoscopy and discuss hemorrhoid treatment again next week. She is using topical hemorrhoid foam prescribed by GI consultant. Her recent CBC on 6/2/17 did not show anemia.

## 2017-06-15 ENCOUNTER — PATIENT MESSAGE (OUTPATIENT)
Dept: MEDICAL GROUP | Age: 67
End: 2017-06-15

## 2017-08-16 ENCOUNTER — RX ONLY (OUTPATIENT)
Age: 67
Setting detail: RX ONLY
End: 2017-08-16

## 2017-08-16 DIAGNOSIS — E78.5 HYPERLIPIDEMIA, UNSPECIFIED HYPERLIPIDEMIA TYPE: ICD-10-CM

## 2017-08-16 RX ORDER — SIMVASTATIN 20 MG
20 TABLET ORAL EVERY EVENING
Qty: 90 TAB | Refills: 3 | Status: SHIPPED | OUTPATIENT
Start: 2017-08-16 | End: 2017-10-20 | Stop reason: SDUPTHER

## 2017-09-11 ENCOUNTER — APPOINTMENT (OUTPATIENT)
Dept: ADMISSIONS | Facility: MEDICAL CENTER | Age: 67
End: 2017-09-11
Attending: SURGERY
Payer: COMMERCIAL

## 2017-09-12 ENCOUNTER — HOSPITAL ENCOUNTER (EMERGENCY)
Facility: MEDICAL CENTER | Age: 67
End: 2017-09-12
Attending: EMERGENCY MEDICINE
Payer: COMMERCIAL

## 2017-09-12 ENCOUNTER — HOSPITAL ENCOUNTER (OUTPATIENT)
Facility: MEDICAL CENTER | Age: 67
End: 2017-09-12
Attending: SURGERY | Admitting: SURGERY
Payer: COMMERCIAL

## 2017-09-12 VITALS
SYSTOLIC BLOOD PRESSURE: 118 MMHG | DIASTOLIC BLOOD PRESSURE: 53 MMHG | RESPIRATION RATE: 18 BRPM | HEIGHT: 64 IN | TEMPERATURE: 98.7 F | OXYGEN SATURATION: 96 % | HEART RATE: 56 BPM | BODY MASS INDEX: 23.71 KG/M2 | WEIGHT: 138.89 LBS

## 2017-09-12 VITALS
WEIGHT: 130.29 LBS | DIASTOLIC BLOOD PRESSURE: 57 MMHG | TEMPERATURE: 98.1 F | HEIGHT: 64 IN | SYSTOLIC BLOOD PRESSURE: 127 MMHG | RESPIRATION RATE: 16 BRPM | HEART RATE: 67 BPM | BODY MASS INDEX: 22.24 KG/M2 | OXYGEN SATURATION: 94 %

## 2017-09-12 DIAGNOSIS — R11.2 NON-INTRACTABLE VOMITING WITH NAUSEA, UNSPECIFIED VOMITING TYPE: ICD-10-CM

## 2017-09-12 DIAGNOSIS — R33.9 URINARY RETENTION: ICD-10-CM

## 2017-09-12 PROBLEM — K64.9 HEMORRHOIDS: Status: ACTIVE | Noted: 2017-09-12

## 2017-09-12 LAB
ANION GAP SERPL CALC-SCNC: 7 MMOL/L (ref 0–11.9)
APPEARANCE UR: CLEAR
BILIRUB UR QL STRIP.AUTO: NEGATIVE
BUN SERPL-MCNC: 8 MG/DL (ref 8–22)
CALCIUM SERPL-MCNC: 8 MG/DL (ref 8.5–10.5)
CHLORIDE SERPL-SCNC: 100 MMOL/L (ref 96–112)
CO2 SERPL-SCNC: 22 MMOL/L (ref 20–33)
COLOR UR: YELLOW
CREAT SERPL-MCNC: 0.69 MG/DL (ref 0.5–1.4)
CULTURE IF INDICATED INDCX: NO UA CULTURE
EKG IMPRESSION: NORMAL
GFR SERPL CREATININE-BSD FRML MDRD: >60 ML/MIN/1.73 M 2
GLUCOSE SERPL-MCNC: 136 MG/DL (ref 65–99)
GLUCOSE UR STRIP.AUTO-MCNC: NEGATIVE MG/DL
KETONES UR STRIP.AUTO-MCNC: NEGATIVE MG/DL
LEUKOCYTE ESTERASE UR QL STRIP.AUTO: NEGATIVE
MICRO URNS: NORMAL
NITRITE UR QL STRIP.AUTO: NEGATIVE
PH UR STRIP.AUTO: 5 [PH]
POTASSIUM SERPL-SCNC: 3.8 MMOL/L (ref 3.6–5.5)
PROT UR QL STRIP: NEGATIVE MG/DL
RBC UR QL AUTO: NEGATIVE
SODIUM SERPL-SCNC: 129 MMOL/L (ref 135–145)
SP GR UR STRIP.AUTO: 1.02
UROBILINOGEN UR STRIP.AUTO-MCNC: 0.2 MG/DL

## 2017-09-12 PROCEDURE — 501838 HCHG SUTURE GENERAL: Performed by: SURGERY

## 2017-09-12 PROCEDURE — 700111 HCHG RX REV CODE 636 W/ 250 OVERRIDE (IP): Performed by: EMERGENCY MEDICINE

## 2017-09-12 PROCEDURE — 160028 HCHG SURGERY MINUTES - 1ST 30 MINS LEVEL 3: Performed by: SURGERY

## 2017-09-12 PROCEDURE — 160002 HCHG RECOVERY MINUTES (STAT): Performed by: SURGERY

## 2017-09-12 PROCEDURE — 93005 ELECTROCARDIOGRAM TRACING: CPT | Performed by: SURGERY

## 2017-09-12 PROCEDURE — 51702 INSERT TEMP BLADDER CATH: CPT

## 2017-09-12 PROCEDURE — 700111 HCHG RX REV CODE 636 W/ 250 OVERRIDE (IP)

## 2017-09-12 PROCEDURE — 160048 HCHG OR STATISTICAL LEVEL 1-5: Performed by: SURGERY

## 2017-09-12 PROCEDURE — 96361 HYDRATE IV INFUSION ADD-ON: CPT

## 2017-09-12 PROCEDURE — 160039 HCHG SURGERY MINUTES - EA ADDL 1 MIN LEVEL 3: Performed by: SURGERY

## 2017-09-12 PROCEDURE — 96374 THER/PROPH/DIAG INJ IV PUSH: CPT

## 2017-09-12 PROCEDURE — 303105 HCHG CATHETER EXTRA

## 2017-09-12 PROCEDURE — 700101 HCHG RX REV CODE 250

## 2017-09-12 PROCEDURE — 700102 HCHG RX REV CODE 250 W/ 637 OVERRIDE(OP)

## 2017-09-12 PROCEDURE — 93010 ELECTROCARDIOGRAM REPORT: CPT | Performed by: INTERNAL MEDICINE

## 2017-09-12 PROCEDURE — 80048 BASIC METABOLIC PNL TOTAL CA: CPT

## 2017-09-12 PROCEDURE — 160036 HCHG PACU - EA ADDL 30 MINS PHASE I: Performed by: SURGERY

## 2017-09-12 PROCEDURE — 502240 HCHG MISC OR SUPPLY RC 0272: Performed by: SURGERY

## 2017-09-12 PROCEDURE — 700105 HCHG RX REV CODE 258: Performed by: EMERGENCY MEDICINE

## 2017-09-12 PROCEDURE — A9270 NON-COVERED ITEM OR SERVICE: HCPCS

## 2017-09-12 PROCEDURE — 500445 HCHG HEMOSTAT, SURGICEL 4X8: Performed by: SURGERY

## 2017-09-12 PROCEDURE — 99284 EMERGENCY DEPT VISIT MOD MDM: CPT

## 2017-09-12 PROCEDURE — 160025 RECOVERY II MINUTES (STATS): Performed by: SURGERY

## 2017-09-12 PROCEDURE — 160009 HCHG ANES TIME/MIN: Performed by: SURGERY

## 2017-09-12 PROCEDURE — 160035 HCHG PACU - 1ST 60 MINS PHASE I: Performed by: SURGERY

## 2017-09-12 PROCEDURE — 160046 HCHG PACU - 1ST 60 MINS PHASE II: Performed by: SURGERY

## 2017-09-12 PROCEDURE — 81003 URINALYSIS AUTO W/O SCOPE: CPT

## 2017-09-12 RX ORDER — OXYCODONE HYDROCHLORIDE AND ACETAMINOPHEN 5; 325 MG/1; MG/1
1-2 TABLET ORAL EVERY 4 HOURS PRN
Qty: 60 TAB | Refills: 0 | Status: SHIPPED | OUTPATIENT
Start: 2017-09-12 | End: 2018-01-24

## 2017-09-12 RX ORDER — LIDOCAINE HYDROCHLORIDE 10 MG/ML
INJECTION, SOLUTION INFILTRATION; PERINEURAL
Status: COMPLETED
Start: 2017-09-12 | End: 2017-09-12

## 2017-09-12 RX ORDER — BUPIVACAINE HYDROCHLORIDE AND EPINEPHRINE 5; 5 MG/ML; UG/ML
INJECTION, SOLUTION EPIDURAL; INTRACAUDAL; PERINEURAL
Status: DISCONTINUED | OUTPATIENT
Start: 2017-09-12 | End: 2017-09-12 | Stop reason: HOSPADM

## 2017-09-12 RX ORDER — ONDANSETRON 2 MG/ML
4 INJECTION INTRAMUSCULAR; INTRAVENOUS ONCE
Status: COMPLETED | OUTPATIENT
Start: 2017-09-12 | End: 2017-09-12

## 2017-09-12 RX ORDER — OXYCODONE HYDROCHLORIDE AND ACETAMINOPHEN 5; 325 MG/1; MG/1
TABLET ORAL
Status: COMPLETED
Start: 2017-09-12 | End: 2017-09-12

## 2017-09-12 RX ORDER — SODIUM CHLORIDE 9 MG/ML
1000 INJECTION, SOLUTION INTRAVENOUS ONCE
Status: COMPLETED | OUTPATIENT
Start: 2017-09-12 | End: 2017-09-12

## 2017-09-12 RX ORDER — LIDOCAINE HYDROCHLORIDE 10 MG/ML
0.5 INJECTION, SOLUTION INFILTRATION; PERINEURAL
Status: COMPLETED | OUTPATIENT
Start: 2017-09-12 | End: 2017-09-12

## 2017-09-12 RX ORDER — ONDANSETRON 4 MG/1
4 TABLET, ORALLY DISINTEGRATING ORAL EVERY 8 HOURS PRN
Qty: 10 TAB | Refills: 0 | Status: SHIPPED | OUTPATIENT
Start: 2017-09-12 | End: 2018-01-24

## 2017-09-12 RX ORDER — SODIUM CHLORIDE, SODIUM LACTATE, POTASSIUM CHLORIDE, CALCIUM CHLORIDE 600; 310; 30; 20 MG/100ML; MG/100ML; MG/100ML; MG/100ML
INJECTION, SOLUTION INTRAVENOUS CONTINUOUS
Status: DISCONTINUED | OUTPATIENT
Start: 2017-09-12 | End: 2017-09-12 | Stop reason: HOSPADM

## 2017-09-12 RX ORDER — POLYETHYLENE GLYCOL 3350 17 G/17G
17 POWDER, FOR SOLUTION ORAL PRN
Qty: 1 BOTTLE | Status: SHIPPED | OUTPATIENT
Start: 2017-09-12 | End: 2017-09-13

## 2017-09-12 RX ORDER — LIDOCAINE AND PRILOCAINE 25; 25 MG/G; MG/G
1 CREAM TOPICAL
Status: COMPLETED | OUTPATIENT
Start: 2017-09-12 | End: 2017-09-12

## 2017-09-12 RX ADMIN — FENTANYL CITRATE 25 MCG: 50 INJECTION, SOLUTION INTRAMUSCULAR; INTRAVENOUS at 10:00

## 2017-09-12 RX ADMIN — OXYCODONE AND ACETAMINOPHEN 2 TABLET: 5; 325 TABLET ORAL at 10:15

## 2017-09-12 RX ADMIN — ONDANSETRON 4 MG: 2 INJECTION INTRAMUSCULAR; INTRAVENOUS at 22:52

## 2017-09-12 RX ADMIN — FENTANYL CITRATE 25 MCG: 50 INJECTION, SOLUTION INTRAMUSCULAR; INTRAVENOUS at 09:44

## 2017-09-12 RX ADMIN — LIDOCAINE HYDROCHLORIDE 0.5 ML: 10 INJECTION, SOLUTION INFILTRATION; PERINEURAL at 07:30

## 2017-09-12 RX ADMIN — SODIUM CHLORIDE 1000 ML: 9 INJECTION, SOLUTION INTRAVENOUS at 22:52

## 2017-09-12 RX ADMIN — FENTANYL CITRATE 25 MCG: 50 INJECTION, SOLUTION INTRAMUSCULAR; INTRAVENOUS at 09:51

## 2017-09-12 RX ADMIN — SODIUM CHLORIDE, SODIUM LACTATE, POTASSIUM CHLORIDE, CALCIUM CHLORIDE: 600; 310; 30; 20 INJECTION, SOLUTION INTRAVENOUS at 07:45

## 2017-09-12 ASSESSMENT — LIFESTYLE VARIABLES
TOTAL SCORE: 0
TOTAL SCORE: 0
CONSUMPTION TOTAL: INCOMPLETE
HAVE PEOPLE ANNOYED YOU BY CRITICIZING YOUR DRINKING: NO
DO YOU DRINK ALCOHOL: YES
EVER HAD A DRINK FIRST THING IN THE MORNING TO STEADY YOUR NERVES TO GET RID OF A HANGOVER: NO
EVER FELT BAD OR GUILTY ABOUT YOUR DRINKING: NO
HAVE YOU EVER FELT YOU SHOULD CUT DOWN ON YOUR DRINKING: NO
TOTAL SCORE: 0

## 2017-09-12 ASSESSMENT — PAIN SCALES - GENERAL
PAINLEVEL_OUTOF10: 8
PAINLEVEL_OUTOF10: 2
PAINLEVEL_OUTOF10: 0
PAINLEVEL_OUTOF10: 4

## 2017-09-12 ASSESSMENT — ENCOUNTER SYMPTOMS
ABDOMINAL PAIN: 1
VOMITING: 1
NAUSEA: 1

## 2017-09-12 NOTE — OR NURSING
1600: Phone call received from patient stating that she hasn't urinated in 5 hrs. She states that she doesn't feel like her bladder is full at this time. Pt instructed to continue drinking fluids as she may be dehydrated and to come to the ER or call Dr. Quinteros's office if she is unable to urinate in he next few hours.

## 2017-09-12 NOTE — OR NURSING
Patient clinic visit for B12 Injection    Pre-Injection Assessment: Vital signs entered., Allergies assessed as required for injection type., Pt states feeling well, no complaints. and Pt denies signs and symptoms of infection.      Refer to MAR (medication administration record) for type of injection and medication given.  Needle Size: 25 g. 1\"  Patient tolerated well: Stable and Follow up appointment scheduled   Future Appointments  Date Time Provider Department Center   6/1/2017 1:15 PM [facility]   6/15/2017 3:00 PM SL[facility]4 ONC RESOURCES [facility]   7/11/2017 10:15 AM [facility]   7/18/2017 8:30 AM EDG LAB [facility]   7/20/2017 3:00 PM [facility]   7/20/2017 3:15 PM SLMON4 ONC RESOURCES [facility]   7/25/2017 10:00 AM Manisha Farrell MD EDGFP [facility]   8/2/2017 9:30 AM [facility]   8/17/2017 3:00 PM [facility]   9/21/2017 3:00 PM SLMON4 ONC RESOURCES [facility]   10/19/2017 3:00 PM SL[facility]4 LAB [facility]   10/19/2017 3:15 PM [facility]4 ONC RESOURCES [facility]   11/16/2017 3:00 PM [facility]4 ONC RESOURCES [facility]   12/21/2017 3:00 PM [facility]4 ONC RESOURCES [facility]   1/23/2018 10:00 AM [facility]   1/23/2018 10:15 AM Pooja Lopez MD [address]      Dr. Daryl Angela is supervising provider today.         Pt ambulating to bathroom.  Steady gait.  Unable to void at this time.

## 2017-09-12 NOTE — OP REPORT
PREOP DIAGNOSIS: Symptomatic hemorrhoids     POSTOP DIAGNOSIS: Same    PROCEDURE: Hemorrhoid artery ligation and rectoanal repair    SURGEON: Aleksandr Quinteros MD    ASSISTANT: None    ANESTHESIA:  Anesthesiologist: Baljit Polanco M.D.    EBL: 10 mL    SPECIMENS: None    COMPLICATIONS:  none    CONDITION: stable    INDICATIONS: A 67-year-old female presents with symptomatic hemorrhoids which are refractory to medical management. Now presents for hemorrhoid ligation and rectopexy. Risks benefits alternatives of surgery were placed. The patient before proceeding.    FINDINGS: Ligations performed ×5 and pexied ×3 additional stitch placed for hemostasis.    OPERATION: After informed consent was obtained the patient taken the operating room placed in the supine position after adequate endotracheal anesthesia was achieved the anus and perineum were prepped and draped in sterile fashion. We began by performing a digital rectal and Hill-Jarvis retractor exam during which we noted enlarged hemorrhoids in 3 columns. Using an AMI device we are able to identify 5 hemorrhoidal vessels which were ligated with figure-of-eight 0 Vicryl sutures. We then performed hemorrhoidal pexy's in 3 columns using running 0 Vicryl sutures as well. All sutures were placed proximal to the dentate line. At the conclusion of this procedure we noted some bleeding anteriorly which was dealt with by a figure-of-eight 0 Vicryl suture as well. Local anesthetic block was given with Marcaine with Epinephrine and Surgicel Was Placed in the Anal Canal.  The procedure was concluded and the patient returned to the PACU in stable condition, all instrument counts were correct at the end of the procedure.

## 2017-09-12 NOTE — DISCHARGE INSTRUCTIONS
ACTIVITY: Rest and take it easy for the first 24 hours.  A responsible adult is recommended to remain with you during that time.  It is normal to feel sleepy.  We encourage you to not do anything that requires balance, judgment or coordination.    MILD FLU-LIKE SYMPTOMS ARE NORMAL. YOU MAY EXPERIENCE GENERALIZED MUSCLE ACHES, THROAT IRRITATION, HEADACHE AND/OR SOME NAUSEA.    FOR 24 HOURS DO NOT:  Drive, operate machinery or run household appliances.  Drink beer or alcoholic beverages.   Make important decisions or sign legal documents.    SPECIAL INSTRUCTIONS:   Diet and activity as tolerated   Warm baths several times a day   Follow preop instructions given to you at your pre op appointment with Dr. Quinteros  Hemorrhoidectomy, Care After  HOME CARE INSTRUCTIONS  · Avoid straining when having bowel movements.  · Avoid heavy lifting (more than 10 pounds (4.5 kilograms)).  · Only take over-the-counter or prescription medicines for pain, discomfort, or fever as directed by your caregiver.  · Take hot sitz baths for 20 to 30 minutes, 3 to 4 times per day.  · To keep swelling down, apply an ice pack for twenty minutes three to four times per day between sitz baths. Use a towel between your skin and the ice pack. Do not do this if it causes too much discomfort.  · Keep anal area clean and dry. Following a bowel movement, you can gently wash the area with tucks (available for purchase at a drugstore) or cotton swabs. Gently pat the area dry. Do not rub the area.  · Eat a well balanced diet and drink 6 to 8 glasses of water every day to avoid constipation. A bulk laxative may be also be helpful.  SEEK MEDICAL CARE IF:   · You have increasing pain or tenderness near or in the surgical site.  · You are unable to eat or drink.  · You develop nausea or vomiting.  · You develop uncontrolled bleeding such as soaking two to three pads in one hour.  · You have constipation, not helped by changing your diet or increasing your fluid  intake. Pain medications are a common cause of constipation.  · You have pain and redness (inflammation) extending outside the area of your surgery.  · You develop an unexplained oral temperature above 102° F (38.9° C), or any other signs of infection.  · You have any other questions or concerns following surgery.     DIET: To avoid nausea, slowly advance diet as tolerated, avoiding spicy or greasy foods for the first day.  Add more substantial food to your diet according to your physician's instructions.  Babies can be fed formula or breast milk as soon as they are hungry.  INCREASE FLUIDS AND FIBER TO AVOID CONSTIPATION.    SURGICAL DRESSING/BATHING: *Take warm baths several times per day*    FOLLOW-UP APPOINTMENT:  A follow-up appointment should be arranged with your doctor; call to schedule.    You should CALL YOUR PHYSICIAN if you develop:  Fever greater than 101 degrees F.  Pain not relieved by medication, or persistent nausea or vomiting.  Excessive bleeding (blood soaking through dressing) or unexpected drainage from the wound.  Extreme redness or swelling around the incision site, drainage of pus or foul smelling drainage.  Inability to urinate or empty your bladder within 8 hours.  Problems with breathing or chest pain.    You should call 911 if you develop problems with breathing or chest pain.  If you are unable to contact your doctor or surgical center, you should go to the nearest emergency room or urgent care center.  Physician's telephone #: *Dr. Quinteros [phone]*    If any questions arise, call your doctor.  If your doctor is not available, please feel free to call the Surgical Center at [phone].  The Center is open Monday through Friday from 7AM to 7PM.  You can also call the [employer] HOTLINE open 24 hours/day, 7 days/week and speak to a nurse at [phone], or toll free at [phone].    A registered nurse may call you a few days after your surgery to see how you are doing after  your procedure.    MEDICATIONS: Resume taking daily medication.  Take prescribed pain medication with food.  If no medication is prescribed, you may take non-aspirin pain medication if needed.  PAIN MEDICATION CAN BE VERY CONSTIPATING.  Take a stool softener or laxative such as senokot, pericolace, or milk of magnesia if needed.    Prescription given for *Percocet and Miralax*.  Last pain medication given at *10:15am*.    If your physician has prescribed pain medication that includes Acetaminophen (Tylenol), do not take additional Acetaminophen (Tylenol) while taking the prescribed medication.    Depression / Suicide Risk    As you are discharged from this [facility] facility, it is important to learn how to keep safe from harming yourself.    Recognize the warning signs:  · Abrupt changes in personality, positive or negative- including increase in energy   · Giving away possessions  · Change in eating patterns- significant weight changes-  positive or negative  · Change in sleeping patterns- unable to sleep or sleeping all the time   · Unwillingness or inability to communicate  · Depression  · Unusual sadness, discouragement and loneliness  · Talk of wanting to die  · Neglect of personal appearance   · Rebelliousness- reckless behavior  · Withdrawal from people/activities they love  · Confusion- inability to concentrate     If you or a loved one observes any of these behaviors or has concerns about self-harm, here's what you can do:  · Talk about it- your feelings and reasons for harming yourself  · Remove any means that you might use to hurt yourself (examples: pills, rope, extension cords, firearm)  · Get professional help from the community (Mental Health, Substance Abuse, psychological counseling)  · Do not be alone:Call your Safe Contact- someone whom you trust who will be there for you.  · Call your local CRISIS HOTLINE 819-9675 or 633-423-3156  · Call your local Children's Mobile Crisis Response Team  [facility] (588) 392-6362 or www.[employer] [place]  · Call the toll free National Suicide Prevention Hotlines   · National Suicide Prevention Lifeline 525-922-PDVQ (1531)  · National Hope Line Network 800-SUICIDE (453-5451)

## 2017-09-13 ENCOUNTER — HOSPITAL ENCOUNTER (EMERGENCY)
Facility: MEDICAL CENTER | Age: 67
End: 2017-09-13
Attending: EMERGENCY MEDICINE
Payer: COMMERCIAL

## 2017-09-13 VITALS
OXYGEN SATURATION: 95 % | TEMPERATURE: 99.3 F | HEART RATE: 77 BPM | RESPIRATION RATE: 18 BRPM | DIASTOLIC BLOOD PRESSURE: 67 MMHG | SYSTOLIC BLOOD PRESSURE: 130 MMHG

## 2017-09-13 DIAGNOSIS — F41.8 SITUATIONAL ANXIETY: ICD-10-CM

## 2017-09-13 DIAGNOSIS — G89.18 POST-OP PAIN: ICD-10-CM

## 2017-09-13 DIAGNOSIS — R33.9 URINARY RETENTION: ICD-10-CM

## 2017-09-13 DIAGNOSIS — R11.2 NON-INTRACTABLE VOMITING WITH NAUSEA, UNSPECIFIED VOMITING TYPE: ICD-10-CM

## 2017-09-13 LAB
ALBUMIN SERPL BCP-MCNC: 3.6 G/DL (ref 3.2–4.9)
ALBUMIN/GLOB SERPL: 1.3 G/DL
ALP SERPL-CCNC: 57 U/L (ref 30–99)
ALT SERPL-CCNC: 19 U/L (ref 2–50)
ANION GAP SERPL CALC-SCNC: 8 MMOL/L (ref 0–11.9)
AST SERPL-CCNC: 28 U/L (ref 12–45)
BASOPHILS # BLD AUTO: 0.1 % (ref 0–1.8)
BASOPHILS # BLD: 0.02 K/UL (ref 0–0.12)
BILIRUB SERPL-MCNC: 0.8 MG/DL (ref 0.1–1.5)
BUN SERPL-MCNC: 7 MG/DL (ref 8–22)
CALCIUM SERPL-MCNC: 8.4 MG/DL (ref 8.4–10.2)
CHLORIDE SERPL-SCNC: 100 MMOL/L (ref 96–112)
CO2 SERPL-SCNC: 23 MMOL/L (ref 20–33)
CREAT SERPL-MCNC: 0.86 MG/DL (ref 0.5–1.4)
EOSINOPHIL # BLD AUTO: 0 K/UL (ref 0–0.51)
EOSINOPHIL NFR BLD: 0 % (ref 0–6.9)
ERYTHROCYTE [DISTWIDTH] IN BLOOD BY AUTOMATED COUNT: 40.5 FL (ref 35.9–50)
GFR SERPL CREATININE-BSD FRML MDRD: >60 ML/MIN/1.73 M 2
GLOBULIN SER CALC-MCNC: 2.7 G/DL (ref 1.9–3.5)
GLUCOSE SERPL-MCNC: 120 MG/DL (ref 65–99)
HCT VFR BLD AUTO: 34.4 % (ref 37–47)
HGB BLD-MCNC: 12.4 G/DL (ref 12–16)
IMM GRANULOCYTES # BLD AUTO: 0.06 K/UL (ref 0–0.11)
IMM GRANULOCYTES NFR BLD AUTO: 0.4 % (ref 0–0.9)
LIPASE SERPL-CCNC: 24 U/L (ref 7–58)
LYMPHOCYTES # BLD AUTO: 1.02 K/UL (ref 1–4.8)
LYMPHOCYTES NFR BLD: 6.7 % (ref 22–41)
MCH RBC QN AUTO: 31.6 PG (ref 27–33)
MCHC RBC AUTO-ENTMCNC: 36 G/DL (ref 33.6–35)
MCV RBC AUTO: 87.5 FL (ref 81.4–97.8)
MONOCYTES # BLD AUTO: 0.81 K/UL (ref 0–0.85)
MONOCYTES NFR BLD AUTO: 5.3 % (ref 0–13.4)
NEUTROPHILS # BLD AUTO: 13.31 K/UL (ref 2–7.15)
NEUTROPHILS NFR BLD: 87.5 % (ref 44–72)
NRBC # BLD AUTO: 0 K/UL
NRBC BLD AUTO-RTO: 0 /100 WBC
PLATELET # BLD AUTO: 231 K/UL (ref 164–446)
PMV BLD AUTO: 9 FL (ref 9–12.9)
POTASSIUM SERPL-SCNC: 3.6 MMOL/L (ref 3.6–5.5)
PROT SERPL-MCNC: 6.3 G/DL (ref 6–8.2)
RBC # BLD AUTO: 3.93 M/UL (ref 4.2–5.4)
SODIUM SERPL-SCNC: 131 MMOL/L (ref 135–145)
TROPONIN I SERPL-MCNC: <0.02 NG/ML (ref 0–0.04)
WBC # BLD AUTO: 15.2 K/UL (ref 4.8–10.8)

## 2017-09-13 PROCEDURE — 99284 EMERGENCY DEPT VISIT MOD MDM: CPT

## 2017-09-13 PROCEDURE — 93005 ELECTROCARDIOGRAM TRACING: CPT | Performed by: EMERGENCY MEDICINE

## 2017-09-13 PROCEDURE — 36415 COLL VENOUS BLD VENIPUNCTURE: CPT

## 2017-09-13 PROCEDURE — 700111 HCHG RX REV CODE 636 W/ 250 OVERRIDE (IP): Performed by: EMERGENCY MEDICINE

## 2017-09-13 PROCEDURE — 96375 TX/PRO/DX INJ NEW DRUG ADDON: CPT

## 2017-09-13 PROCEDURE — 96374 THER/PROPH/DIAG INJ IV PUSH: CPT

## 2017-09-13 PROCEDURE — 700105 HCHG RX REV CODE 258: Performed by: EMERGENCY MEDICINE

## 2017-09-13 PROCEDURE — 84484 ASSAY OF TROPONIN QUANT: CPT

## 2017-09-13 PROCEDURE — 85025 COMPLETE CBC W/AUTO DIFF WBC: CPT

## 2017-09-13 PROCEDURE — 96361 HYDRATE IV INFUSION ADD-ON: CPT

## 2017-09-13 PROCEDURE — 80053 COMPREHEN METABOLIC PANEL: CPT

## 2017-09-13 PROCEDURE — 83690 ASSAY OF LIPASE: CPT

## 2017-09-13 RX ORDER — SODIUM CHLORIDE 9 MG/ML
1000 INJECTION, SOLUTION INTRAVENOUS ONCE
Status: COMPLETED | OUTPATIENT
Start: 2017-09-13 | End: 2017-09-13

## 2017-09-13 RX ORDER — MORPHINE SULFATE 4 MG/ML
2 INJECTION, SOLUTION INTRAMUSCULAR; INTRAVENOUS ONCE
Status: COMPLETED | OUTPATIENT
Start: 2017-09-13 | End: 2017-09-13

## 2017-09-13 RX ORDER — ONDANSETRON 2 MG/ML
4 INJECTION INTRAMUSCULAR; INTRAVENOUS ONCE
Status: COMPLETED | OUTPATIENT
Start: 2017-09-13 | End: 2017-09-13

## 2017-09-13 RX ORDER — MULTIVIT WITH MINERALS/LUTEIN
1 TABLET ORAL DAILY
Status: SHIPPED | COMMUNITY
End: 2021-08-31

## 2017-09-13 RX ADMIN — ONDANSETRON 4 MG: 2 INJECTION INTRAMUSCULAR; INTRAVENOUS at 07:40

## 2017-09-13 RX ADMIN — SODIUM CHLORIDE 1000 ML: 9 INJECTION, SOLUTION INTRAVENOUS at 07:40

## 2017-09-13 RX ADMIN — MORPHINE SULFATE 2 MG: 4 INJECTION INTRAVENOUS at 07:40

## 2017-09-13 ASSESSMENT — PAIN SCALES - GENERAL: PAINLEVEL_OUTOF10: 10

## 2017-09-13 NOTE — ED NOTES
Patient tolerated PO challenge well. Patient given discharge paperwork and verbalized understanding. Patient out of ED ambulatory with . Patient in stable condition and vitals stable and no distress noted.

## 2017-09-13 NOTE — ED PROVIDER NOTES
CHIEF COMPLAINT  Chief Complaint   Patient presents with   • N/V   • Pain       HPI  Radha Land is a 67 y.o. female who presents To the ER complaining of postoperative nausea, vomiting and pain.  Patient underwent an elective outpatient hemorrhoidectomy yesterday.  This was initially concomitantly and she went home.  She will return to the ER last night because she was unable to urinate.  The catheter is placed and she was discharged home.  Since then, she's had intractable nausea and vomiting as been unable to tolerate food or fluids.  She is worsening pain.  She is not tolerating her pain medicines.  Pain is primarily associated with the postoperative site.  Denies any hematemesis or melena.  Denies any dysuria for this.  Denies any other acute concerns or complaints.        REVIEW OF SYSTEMS  See HPI for further details. All other systems are negative.    PAST MEDICAL HISTORY  Past Medical History:   Diagnosis Date   • Hyperlipidemia    • Migraine    • Thyroid disease        FAMILY HISTORY  Family History   Problem Relation Age of Onset   • Hyperlipidemia Mother    • Thyroid Mother      Hypothyroid   • Cancer Mother 90     uterine cancer   • Diabetes Father    • Heart Failure Father    • Heart Disease Father      Rhumatic vavular heart disease   • Other Father      rheumatic fever   • Diabetes Sister    • Cancer Other      breast   • Diabetes Brother    • Cancer Maternal Grandmother      uterine       SOCIAL HISTORY  Social History     Social History   • Marital status:      Spouse name: N/A   • Number of children: N/A   • Years of education: N/A     Social History Main Topics   • Smoking status: Former Smoker     Packs/day: 0.50     Years: 15.00     Quit date: 6/1/1985   • Smokeless tobacco: Never Used      Comment: smoked for 14 years 7 pack year history   • Alcohol use 2.4 oz/week     4 Glasses of wine per week      Comment: 1 glass of beer a day   • Drug use: No   • Sexual activity: No       Comment: works at [employer]     Other Topics Concern   • Not on file     Social History Narrative   • No narrative on file       SURGICAL HISTORY  Past Surgical History:   Procedure Laterality Date   • HEMORRHOIDECTOMY  9/12/2017    Procedure: HEMORRHOIDECTOMY- ARTERY LIGATION, RECTAL ANO REPAIR;  Surgeon: Aleksandr Quinteros M.D.;  Location: SURGERY [facility];  Service: General   • ABDOMINAL HYSTERECTOMY TOTAL      Fibroids // 1990   • OTHER ORTHOPEDIC SURGERY      surgery to big toe   • THYROIDECTOMY      partial       CURRENT MEDICATIONS  Home Medications     Reviewed by Odilia Price PhT (Pharmacy Tech) on 09/13/17 at 0730  Med List Status: Complete   Medication Last Dose Status   Ascorbic Acid (VITAMIN C) 1000 MG Tab 9/10/2017 Active   B Complex-C (SUPER B COMPLEX PO) 9/10/2017 Active   Calcium Carbonate-Vitamin D (CALCIUM-CARB 600 + D PO) 9/10/2017 Active   Coenzyme Q10-Levocarnitine (CO Q-10 PLUS PO) 9/10/2017 Active   docusate sodium (COLACE) 250 MG capsule 9/11/2017 Active   ergocalciferol (DRISDOL) 24663 UNIT capsule 9/10/2017 Active   estradiol (ESTRACE) 0.1 MG/GM vaginal cream 9/6/2017 Active   levothyroxine (SYNTHROID) 75 MCG Tab 9/11/2017 Active   Multiple Vitamins-Minerals (MULTIVITAMIN PO) 9/10/2017 Active   ondansetron (ZOFRAN ODT) 4 MG TABLET DISPERSIBLE 9/13/2017 Active   oxycodone-acetaminophen (PERCOCET) 5-325 MG Tab 9/12/2017 Active   Probiotic Product (PROBIOTIC ADVANCED PO) 9/10/2017 Active   simvastatin (ZOCOR) 20 MG Tab 9/11/2017 Active   sumatriptan (IMITREX) 50 MG Tab PRN Active                ALLERGIES  No Known Allergies    PHYSICAL EXAM  VITAL SIGNS: /67   Pulse 83   Temp 37.3 °C (99.1 °F)   Resp 18   SpO2 97%    Constitutional: Well developed, Well nourished, No acute distress, Non-toxic appearance.   HENT: Normocephalic, Atraumatic, Bilateral external ears normal, Oropharynx moist, No oral exudates, Nose normal.   Eyes: PERRL, EOMI, Conjunctiva normal, No discharge.    Neck: Normal range of motion, No tenderness, Supple, No stridor.   Lymphatic: No lymphadenopathy noted.   Cardiovascular: Normal heart rate, Normal rhythm, No murmurs, No rubs, No gallops.   Thorax & Lungs: Normal breath sounds, No respiratory distress, No wheezing,  Abdomen: Bowel sounds normal, Soft, No tenderness,  Rectal: Some bruising around the anus.  Postoperative changes in the rectum.  Nothing acute.  No signs of infection.  Skin: Warm, Dry, No erythema, No rash.   Back: No tenderness, No CVA tenderness.   Musculoskeletal: Good range of motion in all major joints.  Neurologic: Alert & oriented x 3, No focal deficits noted.   Psychiatric: Affect anxious *    RADIOLOGY/PROCEDURES    EKG Interpretation    Interpreted by me    Rhythm: normal sinus   Rate: normal  Axis: normal  Ectopy: none  Conduction: normal  ST Segments: no acute change  T Waves: no acute change  Q Waves: none    Clinical Impression: no acute changes and normal EKG    COURSE & MEDICAL DECISION MAKING  Pertinent Labs & Imaging studies reviewed. (See chart for details)  *To the ER cleared, nausea, vomiting, postop pain urinary retention.  The patient underwent a hemorrhoidectomy yesterday and went to the ER last night for urinary retention.  A Parker catheter was placed.  Urinalysis was negative.  This point, I removed the Parker catheter because I think the patient just had urinary retention associated with anesthesia and I don't think she requires a Parker catheter still.    Patient is having some nausea, vomiting, so if given her IV fluids.    Indication for IV fluids as dehydration from nausea, vomiting and poor by mouth.    Plan the patient is worked up.  After some Zofran.  She feels better.  She is tolerating food and fluids.  Repeat abdominal exam is benign without tenderness or peritonitis.  The patient is not ill-appearing.  The patient has a mild leukocytosis which I think is either stress and operations are stressed emargination from  vomiting.  At this point, I don't think she requires a further workup.  At this point.  Her belly is benign, but diminished.  Repeat exam at all that she needs a CT.  Rectal exam shows some expected postoperative changes but nothing else.    I spoke with the surgeon, Dr. Aldair abreu is  comfortable sending her home.  She is febrile.  The plan.  She has a prescription for nausea medicine, pain meds, a stool softener which I'll encourage her to take.  She'll return to the ER.  She has worsening pain or other concerns.      FINAL IMPRESSION  1. Non-intractable vomiting with nausea, unspecified vomiting type    2. Post-op pain    3. Urinary retention    4.  Situational anxiety    2.   3.         Electronically signed by: Davin Abraham, 9/13/2017 7:47 AM  ED Provider Note

## 2017-09-13 NOTE — ED NOTES
ERP aware. Parker placed per ERP, pt tolerated well. Draining clear, yellow urine. Will place leg bag before discharge

## 2017-09-13 NOTE — ED NOTES
"Chief Complaint   Patient presents with   • Post-Op Complications     hemorrhoid surgery this morning and hasn't been able to urinate since   • Urinary Retention     Pt ambulatory with steady gait to to triage for above complaints. Family at bedside. Pt is AOx4, denies CP/SOB. Pt states she has been drinking water but has been unable to urinate, and feels as if she needs to. Pt denies bleeding from recent surgery. /53   Pulse 76   Temp 37.1 °C (98.7 °F) (Temporal)   Resp 14   Ht 1.626 m (5' 4\")   Wt 63 kg (138 lb 14.2 oz)   SpO2 94%   BMI 23.84 kg/m²   Pt informed and educated on triage process and wait times. Pt verbalizes understanding to inform either triage tech or RN to alert staff for any changes in condition. Pt placed in lobby.    "

## 2017-09-13 NOTE — ED NOTES
700ml of urine drained into catheter bag. Leg bag applied. Pt given discharge instructions. IV removed. All questions and concerns addressed.

## 2017-09-13 NOTE — ED NOTES
Went to d/c pt. Pt states has attempted to urinate a few times with no luck. Bladder scan done shows 417. Pt to bathroom to attempt to urinate. Will inform ERP

## 2017-09-13 NOTE — ED PROVIDER NOTES
ED Provider Note    Scribed for Александр Jimenez M.D. by Aracely Olson. 9/12/2017, 10:36 PM.    Primary care provider: Clarisse Toro M.D.  Means of arrival: walk in   History obtained from: patient   History limited by: none     CHIEF COMPLAINT  Chief Complaint   Patient presents with   • Post-Op Complications     hemorrhoid surgery this morning and hasn't been able to urinate since   • Urinary Retention       HPI  Radha Land is a 67 y.o. female who presents to the Emergency Department with complaints of being unable to urinate since 3 AM this morning. Patient reports that she had internal hemorrhoid surgery with a rectal repair done by Dr. Quinteros this morning (9/12/2017). She reports associated vomiting 3-4 times today, nausea, and abdominal pain. Patient denies any rectal bleeding or dysuria. She denies any allergies to medications.       REVIEW OF SYSTEMS  Review of Systems   Gastrointestinal: Positive for abdominal pain, nausea and vomiting.   Genitourinary: Negative for dysuria.        Unable to urinate   No rectal bleeding    E    PAST MEDICAL HISTORY   has a past medical history of Hyperlipidemia; Migraine; and Thyroid disease.    SURGICAL HISTORY   has a past surgical history that includes abdominal hysterectomy total; thyroidectomy; other orthopedic surgery; and hemorrhoidectomy (9/12/2017).    SOCIAL HISTORY  Social History   Substance Use Topics   • Smoking status: Former Smoker     Packs/day: 0.50     Years: 15.00     Quit date: 6/1/1985   • Smokeless tobacco: Never Used      Comment: smoked for 14 years 7 pack year history   • Alcohol use 2.4 oz/week     4 Glasses of wine per week      Comment: 1 glass of beer a day      History   Drug Use No       FAMILY HISTORY  Family History   Problem Relation Age of Onset   • Hyperlipidemia Mother    • Thyroid Mother      Hypothyroid   • Cancer Mother 90     uterine cancer   • Diabetes Father    • Heart Failure Father    • Heart Disease Father       "Rhumatic vavular heart disease   • Other Father      rheumatic fever   • Diabetes Sister    • Cancer Other      breast   • Diabetes Brother    • Cancer Maternal Grandmother      uterine       CURRENT MEDICATIONS  Home Medications    **Home medications have not yet been reviewed for this encounter**         ALLERGIES  No Known Allergies    PHYSICAL EXAM  VITAL SIGNS: /53   Pulse 76   Temp 37.1 °C (98.7 °F) (Temporal)   Resp 14   Ht 1.626 m (5' 4\")   Wt 63 kg (138 lb 14.2 oz)   SpO2 94%   BMI 23.84 kg/m²     Constitutional: Well developed, Well nourished, mild distress.   HENT: Normocephalic, Atraumatic, slightly dry mucous membranes   Eyes: Conjunctiva normal, No discharge.   Cardiovascular: Normal heart rate, Normal rhythm, No murmurs, equal pulses.   Pulmonary: Normal breath sounds, No respiratory distress, No wheezing, No rales, No rhonchi.  Abdomen:Soft, suprapubic tenderness, No masses, no rebound, no guarding.   Musculoskeletal: No major deformities noted.   Skin: Warm, Dry, No erythema, No rash.   Neurologic: Alert & oriented x 3, Normal motor function,  No focal deficits noted.   Psychiatric: Affect normal, Judgment normal, Mood normal.     LABS  Results for orders placed or performed during the hospital encounter of 09/12/17   URINALYSIS CULTURE, IF INDICATED   Result Value Ref Range    Color Yellow     Character Clear     Specific Gravity 1.017 <1.035    Ph 5.0 5.0 - 8.0    Glucose Negative Negative mg/dL    Ketones Negative Negative mg/dL    Protein Negative Negative mg/dL    Bilirubin Negative Negative    Urobilinogen, Urine 0.2 Negative    Nitrite Negative Negative    Leukocyte Esterase Negative Negative    Occult Blood Negative Negative    Micro Urine Req see below     Culture Indicated No UA Culture   BASIC METABOLIC PANEL   Result Value Ref Range    Sodium 129 (L) 135 - 145 mmol/L    Potassium 3.8 3.6 - 5.5 mmol/L    Chloride 100 96 - 112 mmol/L    Co2 22 20 - 33 mmol/L    Glucose 136 (H) " 65 - 99 mg/dL    Bun 8 8 - 22 mg/dL    Creatinine 0.69 0.50 - 1.40 mg/dL    Calcium 8.0 (L) 8.5 - 10.5 mg/dL    Anion Gap 7.0 0.0 - 11.9   ESTIMATED GFR   Result Value Ref Range    GFR If African American >60 >60 mL/min/1.73 m 2    GFR If Non African American >60 >60 mL/min/1.73 m 2     All labs reviewed by me.    COURSE & MEDICAL DECISION MAKING  Pertinent Labs & Imaging studies reviewed. (See chart for details)    10:36 PM - Patient seen and examined at bedside. Patient will be treated with Zofran 4mg. The patient will be resuscitated with 1L NS IV secondary to patient's dehydration. Ordered BMP, insertion catheter nicole, urinalysis  to evaluate her symptoms. The differential diagnoses include but are not limited to:      11:21 PM- Upon re-evaluation the patient is resting comfortably in bed. Her urine is clear with not signs of infection and she does not look dehydrated at this time. She reports that her surgical area is painful. She has bruising around her rectal area, however, no bleeding. I informed the patient that her pain should resolve in a few days after she has healed. I informed the patient to return to the ED if she is experiencing worsening symptoms. The patient understands and agrees to be discharged home.     Medical Decision Making: *At this point, think the patient most likely had some urinary retention secondary to anesthesia she got earlier in the day. Patient does not have any active bleeding does not appear to have a urinary tract infection or renal insufficiency. She's given IV fluids because patient had dry mucous membranes and felt dehydrated. The patient will return for new or worsening symptoms and is stable at the time of discharge.    The patient is referred to a primary physician for blood pressure management, diabetic screening, and for all other preventative health concerns.    DISPOSITION:  Patient will be discharged home in stable condition.    FOLLOW UP:  Marc Bourne M.D.  [address]  [address]  [place] [address]  [phone]    Schedule an appointment as soon as possible for a visit in 1 week  To have the nicole taken out or see your doctor.       OUTPATIENT MEDICATIONS:  Discharge Medication List as of 9/12/2017 11:35 PM      START taking these medications    Details   ondansetron (ZOFRAN ODT) 4 MG TABLET DISPERSIBLE Take 1 Tab by mouth every 8 hours as needed for Nausea/Vomiting., Disp-10 Tab, R-0, Print Rx Paper               FINAL IMPRESSION  1. Urinary retention    2. Non-intractable vomiting with nausea, unspecified vomiting type          I, Aracely Olson (Eliane), am scribing for, and in the presence of, Александр Jimenez M.D.    Electronically signed by: Aracely Olson (Eliane), 9/12/2017    Александр LOZADA M.D. personally performed the services described in this documentation, as scribed by Aracely Olson in my presence, and it is both accurate and complete.    The note accurately reflects work and decisions made by me.  Александр Jimenez  9/13/2017  7:04 AM

## 2017-09-13 NOTE — ED NOTES
Assumed care of patient. Patient complains of urinary retention with last void at 0300 with four episodes of vomiting today after hemorrhoid surgery this am.  Patient alert and oriented x 4. Patient denies any other complaints at this time. Patient side rails up x 2 and call bell within reach.

## 2017-09-13 NOTE — ED NOTES
Pt had hemorrhoid surgery yesterday. Pt was seen last night at ER d/t N/V and unable to urinate. Pt had nicole placed. Pt states she still has N/V and is in pain d/t being unable to take pain meds.

## 2017-09-14 ENCOUNTER — PATIENT OUTREACH (OUTPATIENT)
Dept: HEALTH INFORMATION MANAGEMENT | Facility: OTHER | Age: 67
End: 2017-09-14

## 2017-09-16 ENCOUNTER — HOSPITAL ENCOUNTER (EMERGENCY)
Facility: MEDICAL CENTER | Age: 67
End: 2017-09-16
Attending: EMERGENCY MEDICINE
Payer: COMMERCIAL

## 2017-09-16 VITALS
OXYGEN SATURATION: 95 % | WEIGHT: 133.82 LBS | RESPIRATION RATE: 16 BRPM | BODY MASS INDEX: 22.85 KG/M2 | TEMPERATURE: 98.6 F | HEIGHT: 64 IN | DIASTOLIC BLOOD PRESSURE: 70 MMHG | SYSTOLIC BLOOD PRESSURE: 136 MMHG | HEART RATE: 72 BPM

## 2017-09-16 DIAGNOSIS — Z76.89 ENCOUNTER FOR EVALUATION OF FOLEY CATHETER: Primary | ICD-10-CM

## 2017-09-16 PROCEDURE — 99282 EMERGENCY DEPT VISIT SF MDM: CPT

## 2017-09-16 ASSESSMENT — PAIN SCALES - GENERAL: PAINLEVEL_OUTOF10: 0

## 2017-09-16 NOTE — ED NOTES
Parker catheter removed. Patient will wait for a period of time and try to void. Patient will take the figueroa off at home so it comes off easily. Area saterated with alcohol to start removal process.

## 2017-09-16 NOTE — ED NOTES
Patient had urinary retention following surgery. One failed attempt to void with out catheter. Cath replaced here late Wednesday. Would like urinary catheter removed today.

## 2017-09-16 NOTE — DISCHARGE INSTRUCTIONS
Acute Urinary Retention, Female  Urinary retention means you are unable to pee completely or at all (empty your bladder).  HOME CARE  · Drink enough fluids to keep your pee (urine) clear or pale yellow.  · If you are sent home with a tube that drains the bladder (catheter), there will be a drainage bag attached to it. There are two types of bags. One is big that you can wear at night without having to empty it. One is smaller and needs to be emptied more often.  ¨ Keep the drainage bag emptied.  ¨ Keep the drainage bag lower than the tube.  · Only take medicine as told by your doctor.  GET HELP IF:  · You have a low-grade fever.  · You have spasms or you are leaking pee when you have spasms.  GET HELP RIGHT AWAY IF:   · You have chills or a fever.  · Your catheter stops draining pee.  · Your catheter falls out.  · You have increased bleeding that does not stop after you have rested and increased the amount of fluids you had been drinking.  MAKE SURE YOU:   · Understand these instructions.  · Will watch your condition.  · Will get help right away if you are not doing well or get worse.     This information is not intended to replace advice given to you by your health care provider. Make sure you discuss any questions you have with your health care provider.     Document Released: 06/05/2009 Document Revised: 05/03/2016 Document Reviewed: 05/29/2014  [employer] Interactive Patient Education ©2016 [employer]

## 2017-09-16 NOTE — ED PROVIDER NOTES
ED Provider Note    CHIEF COMPLAINT  I need my Parker catheter removed    HPI  Radha Land is a 67 y.o. female who presents Parker catheter and placed in time to remove it. The patient had surgery on Tuesday from Dr. Montes for hemorrhoids and some anal work. She subsequently had urinary retention and a Parker catheter placed at night. She then came back to the emergency room with nausea vomiting and urinary retention again. This was corrected with fluids morphine Zofran and another Parker cath was placed. She was told to come back today to have the Parker catheter removed. She offers no complaints at this time says she feels much better no nausea vomiting no fevers no abdominal pain. The pain in her rectal area is much improved.    REVIEW OF SYSTEMS  CONSTITUTIONAL:  Denies fever, chills  CARDIOVASCULAR:  Denies chest pain, palpitations or swelling  RESPIRATORY:  Denies cough or shortness of breath or difficulty breathing  GI:  Denies abdominal pain, nausea, vomiting, or diarrhea  MUSCULOSKELETAL:  Denies weakness joint swelling or back pain  NEUROLOGIC:  Denies headache, focal weakness or numbness        PAST MEDICAL HISTORY  Past Medical History:   Diagnosis Date   • Hyperlipidemia    • Migraine    • Thyroid disease        FAMILY HISTORY  Family History   Problem Relation Age of Onset   • Hyperlipidemia Mother    • Thyroid Mother      Hypothyroid   • Cancer Mother 90     uterine cancer   • Diabetes Father    • Heart Failure Father    • Heart Disease Father      Rhumatic vavular heart disease   • Other Father      rheumatic fever   • Diabetes Sister    • Cancer Other      breast   • Diabetes Brother    • Cancer Maternal Grandmother      uterine       SOCIAL HISTORY   reports that she quit smoking about 32 years ago. She has a 7.50 pack-year smoking history. She has never used smokeless tobacco. She reports that she drinks about 2.4 oz of alcohol per week . She reports that she does not use drugs.    SURGICAL  HISTORY  Past Surgical History:   Procedure Laterality Date   • HEMORRHOIDECTOMY  9/12/2017    Procedure: HEMORRHOIDECTOMY- ARTERY LIGATION, RECTAL ANO REPAIR;  Surgeon: Aleksandr Quinteros M.D.;  Location: SURGERY [facility];  Service: General   • ABDOMINAL HYSTERECTOMY TOTAL      Fibroids // 1990   • OTHER      hemrroidectomy   • OTHER ORTHOPEDIC SURGERY      surgery to big toe   • THYROIDECTOMY      partial       CURRENT MEDICATIONS  No current facility-administered medications for this encounter.     Current Outpatient Prescriptions:   •  B Complex-C (SUPER B COMPLEX PO), Take 1 Tab by mouth every day., Disp: , Rfl:   •  Ascorbic Acid (VITAMIN C) 1000 MG Tab, Take 1 Tab by mouth every day., Disp: , Rfl:   •  oxycodone-acetaminophen (PERCOCET) 5-325 MG Tab, Take 1-2 Tabs by mouth every four hours as needed (pain)., Disp: 60 Tab, Rfl: 0  •  ondansetron (ZOFRAN ODT) 4 MG TABLET DISPERSIBLE, Take 1 Tab by mouth every 8 hours as needed for Nausea/Vomiting., Disp: 10 Tab, Rfl: 0  •  Calcium Carbonate-Vitamin D (CALCIUM-CARB 600 + D PO), Take 1 Tab by mouth every day., Disp: , Rfl:   •  simvastatin (ZOCOR) 20 MG Tab, Take 1 Tab by mouth every evening., Disp: 90 Tab, Rfl: 3  •  ergocalciferol (DRISDOL) 00939 UNIT capsule, Take 1 Cap by mouth every 7 days., Disp: 12 Cap, Rfl: 1  •  levothyroxine (SYNTHROID) 75 MCG Tab, Take 1 Tab by mouth Every morning on an empty stomach., Disp: 90 Tab, Rfl: 3  •  sumatriptan (IMITREX) 50 MG Tab, Take 1 Tab by mouth Once PRN., Disp: 10 Tab, Rfl: 3  •  estradiol (ESTRACE) 0.1 MG/GM vaginal cream, Insert 1 g in vagina every 3 days., Disp: 42.5 g, Rfl: 1  •  Probiotic Product (PROBIOTIC ADVANCED PO), Take 1 Tab by mouth every day., Disp: , Rfl:   •  Multiple Vitamins-Minerals (MULTIVITAMIN PO), Take 1 Tab by mouth every day., Disp: , Rfl:   •  Coenzyme Q10-Levocarnitine (CO Q-10 PLUS PO), Take 1 Tab by mouth every day., Disp: , Rfl:   •  docusate sodium (COLACE) 250 MG capsule, Take 250  "mg by mouth every day., Disp: , Rfl:       ALLERGIES  No Known Allergies    PHYSICAL EXAM  VITAL SIGNS: /70   Pulse 72   Temp 37 °C (98.6 °F)   Resp 16   Ht 1.626 m (5' 4\")   Wt 60.7 kg (133 lb 13.1 oz)   SpO2 95%   BMI 22.97 kg/m²      Constitutional: Patient is awake alert person place and time. No acute respiratory distress Well developed, Well nourished, Non-toxic appearance.   HENT: Normocephalic, Atraumatic,  Cardiovascular: Heart is regular rate and rhythm no murmur, rub or thrill.   Thorax & Lungs: Chest is symmetrical, with good breath sounds.   Abdomen:  Soft, No tenderness no hepatosplenomegaly there is no guarding or rebound, No masses, No pulsatile masses. Bowel sounds are present   Extremities: Noedema.  Non tender.   Musculoskeletal: Good range of motion upper and lower extremities  Neurologic: Alert & oriented  Strength is symmetrical in upper and lower extremities.      Her urine leg bag is clear yellow urine.      COURSE & MEDICAL DECISION MAKING  Pertinent Labs & Imaging studies reviewed. (See chart for details)  Patient had rectal surgery recently. Had what appears to be some side effects from general anesthesia including urinary tension nausea and vomiting. The symptoms seemed to resolve. She is here now to have her Parker catheter taken out. She is well aware that she may not be a little urinate afterwards. However this time will take her Parker catheter out. She understands she can't urinate next 6-8 hours she can return for a Parker catheter replacement. I've offered for her to stay here for a while but this time she like to go home for close follow-up as an outpatient.      Stable for discharge    FINAL IMPRESSION  1. Parker catheter removal  2. Status post hemorrhoidectomy       PLAN  1. Remove Parker catheter  2. Follow up with her primary care doctor and her surgeon Monday  3. Urinary retention information sheet  4. Return to the emergency department for increased pains, fevers, " vomiting or change in condition.  Electronically signed by: Vinod Ford, 9/16/2017 8:01 AM

## 2017-09-17 ENCOUNTER — PATIENT OUTREACH (OUTPATIENT)
Dept: HEALTH INFORMATION MANAGEMENT | Facility: OTHER | Age: 67
End: 2017-09-17

## 2017-09-23 LAB — EKG IMPRESSION: NORMAL

## 2017-10-20 DIAGNOSIS — E78.5 HYPERLIPIDEMIA, UNSPECIFIED HYPERLIPIDEMIA TYPE: ICD-10-CM

## 2017-10-20 NOTE — TELEPHONE ENCOUNTER
Was the patient seen in the last year in this department? Yes     Does patient have an active prescription for medications requested? No     Received Request Via: Pharmacy     zocor change of pharmacy

## 2017-10-21 RX ORDER — SIMVASTATIN 20 MG
20 TABLET ORAL EVERY EVENING
Qty: 90 TAB | Refills: 3 | Status: SHIPPED | OUTPATIENT
Start: 2017-10-21 | End: 2018-06-26 | Stop reason: SDUPTHER

## 2017-10-26 ENCOUNTER — APPOINTMENT (OUTPATIENT)
Dept: SOCIAL WORK | Facility: CLINIC | Age: 67
End: 2017-10-26

## 2017-10-26 PROCEDURE — 90662 IIV NO PRSV INCREASED AG IM: CPT | Performed by: REGISTERED NURSE

## 2017-12-08 DIAGNOSIS — E89.0 POSTOPERATIVE HYPOTHYROIDISM: ICD-10-CM

## 2017-12-08 RX ORDER — LEVOTHYROXINE SODIUM 0.07 MG/1
75 TABLET ORAL
Qty: 90 TAB | Refills: 3 | Status: SHIPPED | OUTPATIENT
Start: 2017-12-08 | End: 2018-06-26 | Stop reason: SDUPTHER

## 2018-01-08 ENCOUNTER — HOSPITAL ENCOUNTER (OUTPATIENT)
Dept: LAB | Facility: MEDICAL CENTER | Age: 68
End: 2018-01-08
Attending: INTERNAL MEDICINE
Payer: MEDICARE

## 2018-01-08 DIAGNOSIS — E55.9 VITAMIN D INSUFFICIENCY: ICD-10-CM

## 2018-01-08 DIAGNOSIS — E78.00 PURE HYPERCHOLESTEROLEMIA: ICD-10-CM

## 2018-01-08 DIAGNOSIS — E89.0 POSTOPERATIVE HYPOTHYROIDISM: ICD-10-CM

## 2018-01-08 DIAGNOSIS — K64.9 BLEEDING HEMORRHOID: ICD-10-CM

## 2018-01-08 LAB
25(OH)D3 SERPL-MCNC: 41 NG/ML (ref 30–100)
ALBUMIN SERPL BCP-MCNC: 4 G/DL (ref 3.2–4.9)
ALBUMIN/GLOB SERPL: 1.3 G/DL
ALP SERPL-CCNC: 58 U/L (ref 30–99)
ALT SERPL-CCNC: 15 U/L (ref 2–50)
ANION GAP SERPL CALC-SCNC: 8 MMOL/L (ref 0–11.9)
AST SERPL-CCNC: 25 U/L (ref 12–45)
BASOPHILS # BLD AUTO: 0.7 % (ref 0–1.8)
BASOPHILS # BLD: 0.04 K/UL (ref 0–0.12)
BILIRUB SERPL-MCNC: 0.4 MG/DL (ref 0.1–1.5)
BUN SERPL-MCNC: 10 MG/DL (ref 8–22)
CALCIUM SERPL-MCNC: 9.4 MG/DL (ref 8.5–10.5)
CHLORIDE SERPL-SCNC: 104 MMOL/L (ref 96–112)
CHOLEST SERPL-MCNC: 205 MG/DL (ref 100–199)
CO2 SERPL-SCNC: 26 MMOL/L (ref 20–33)
CREAT SERPL-MCNC: 0.93 MG/DL (ref 0.5–1.4)
EOSINOPHIL # BLD AUTO: 0.09 K/UL (ref 0–0.51)
EOSINOPHIL NFR BLD: 1.5 % (ref 0–6.9)
ERYTHROCYTE [DISTWIDTH] IN BLOOD BY AUTOMATED COUNT: 45.5 FL (ref 35.9–50)
GLOBULIN SER CALC-MCNC: 3 G/DL (ref 1.9–3.5)
GLUCOSE SERPL-MCNC: 78 MG/DL (ref 65–99)
HCT VFR BLD AUTO: 43.9 % (ref 37–47)
HDLC SERPL-MCNC: 85 MG/DL
HGB BLD-MCNC: 14.4 G/DL (ref 12–16)
IMM GRANULOCYTES # BLD AUTO: 0.02 K/UL (ref 0–0.11)
IMM GRANULOCYTES NFR BLD AUTO: 0.3 % (ref 0–0.9)
LDLC SERPL CALC-MCNC: 106 MG/DL
LYMPHOCYTES # BLD AUTO: 1.19 K/UL (ref 1–4.8)
LYMPHOCYTES NFR BLD: 19.8 % (ref 22–41)
MCH RBC QN AUTO: 30.8 PG (ref 27–33)
MCHC RBC AUTO-ENTMCNC: 32.8 G/DL (ref 33.6–35)
MCV RBC AUTO: 93.8 FL (ref 81.4–97.8)
MONOCYTES # BLD AUTO: 0.38 K/UL (ref 0–0.85)
MONOCYTES NFR BLD AUTO: 6.3 % (ref 0–13.4)
NEUTROPHILS # BLD AUTO: 4.28 K/UL (ref 2–7.15)
NEUTROPHILS NFR BLD: 71.4 % (ref 44–72)
NRBC # BLD AUTO: 0 K/UL
NRBC BLD-RTO: 0 /100 WBC
PLATELET # BLD AUTO: 283 K/UL (ref 164–446)
PMV BLD AUTO: 9.5 FL (ref 9–12.9)
POTASSIUM SERPL-SCNC: 4.1 MMOL/L (ref 3.6–5.5)
PROT SERPL-MCNC: 7 G/DL (ref 6–8.2)
RBC # BLD AUTO: 4.68 M/UL (ref 4.2–5.4)
SODIUM SERPL-SCNC: 138 MMOL/L (ref 135–145)
T4 FREE SERPL-MCNC: 1.44 NG/DL (ref 0.53–1.43)
TRIGL SERPL-MCNC: 72 MG/DL (ref 0–149)
TSH SERPL DL<=0.005 MIU/L-ACNC: 2.43 UIU/ML (ref 0.38–5.33)
WBC # BLD AUTO: 6 K/UL (ref 4.8–10.8)

## 2018-01-08 PROCEDURE — 80053 COMPREHEN METABOLIC PANEL: CPT

## 2018-01-08 PROCEDURE — 80061 LIPID PANEL: CPT

## 2018-01-08 PROCEDURE — 82306 VITAMIN D 25 HYDROXY: CPT

## 2018-01-08 PROCEDURE — 36415 COLL VENOUS BLD VENIPUNCTURE: CPT

## 2018-01-08 PROCEDURE — 85025 COMPLETE CBC W/AUTO DIFF WBC: CPT

## 2018-01-08 PROCEDURE — 84439 ASSAY OF FREE THYROXINE: CPT

## 2018-01-08 PROCEDURE — 84443 ASSAY THYROID STIM HORMONE: CPT

## 2018-01-09 ENCOUNTER — HOSPITAL ENCOUNTER (OUTPATIENT)
Dept: RADIOLOGY | Facility: MEDICAL CENTER | Age: 68
End: 2018-01-09
Attending: INTERNAL MEDICINE
Payer: MEDICARE

## 2018-01-09 DIAGNOSIS — Z12.39 SCREENING BREAST EXAMINATION: ICD-10-CM

## 2018-01-09 PROCEDURE — 77067 SCR MAMMO BI INCL CAD: CPT

## 2018-01-10 ENCOUNTER — TELEPHONE (OUTPATIENT)
Dept: MEDICAL GROUP | Age: 68
End: 2018-01-10

## 2018-01-10 NOTE — TELEPHONE ENCOUNTER
----- Message from Clarisse Toro M.D. sent at 1/9/2018  9:00 PM PST -----  Please advise patient her mammo was normal but it did reveal that she has dense breasts.   Dense breasts may obscure small masses on typical mammogram studies. SonoCine is a new technique that is being used to screen for breast cancer in women with dense breasts. This study is not yet covered by insurance and is $125 out of pocket. If she is interested in having this study done, please contact us for an order. Otherwise, it is recommended that she follow up in one year for her screening mammogram.     Thanks!  Clarisse Toro M.D.

## 2018-01-10 NOTE — TELEPHONE ENCOUNTER
Phone Number Called: [phone] (home) [phone] (work)    Message: Left VM for patient to call back regarding Dr. Toro's message below.    Left Message for patient to call back: yes

## 2018-01-12 ENCOUNTER — APPOINTMENT (OUTPATIENT)
Dept: OTHER | Facility: IMAGING CENTER | Age: 68
End: 2018-01-12

## 2018-01-23 ENCOUNTER — TELEPHONE (OUTPATIENT)
Dept: MEDICAL GROUP | Age: 68
End: 2018-01-23

## 2018-01-23 NOTE — TELEPHONE ENCOUNTER
ESTABLISHED PATIENT PRE-VISIT PLANNING     Note: Patient will not be contacted if there is no indication to call.     1.  Reviewed notes from the last few office visits within the medical group: Yes    2.  If any orders were placed at last visit or intended to be done for this visit (i.e. 6 mos follow-up), do we have Results/Consult Notes?        •  Labs - Labs ordered, completed on 1/8/18 and results are in chart.   Note: If patient appointment is for lab review and patient did not complete labs, check with provider if OK to reschedule patient until labs completed.       •  Imaging - Imaging ordered, completed and results are in chart.       •  Referrals - No referrals were ordered at last office visit.    3. Is this appointment scheduled as a Hospital Follow-Up? No    4.  Immunizations were updated in Epic using WebIZ?: Epic matches WebIZ       •  Web [employer] Recommendations: Patient is up to date on all vaccines    5.  Patient is due for the following Health Maintenance Topics:   Health Maintenance Due   Topic Date Due   • Annual Wellness Visit  1950   • COLONOSCOPY  11/15/2017           6.  Patient was NOT informed to arrive 15 min prior to their scheduled appointment and bring in their medication bottles.

## 2018-01-24 ENCOUNTER — OFFICE VISIT (OUTPATIENT)
Dept: MEDICAL GROUP | Age: 68
End: 2018-01-24
Payer: MEDICARE

## 2018-01-24 VITALS
HEART RATE: 75 BPM | BODY MASS INDEX: 22.2 KG/M2 | TEMPERATURE: 97.6 F | DIASTOLIC BLOOD PRESSURE: 72 MMHG | SYSTOLIC BLOOD PRESSURE: 120 MMHG | OXYGEN SATURATION: 96 % | HEIGHT: 64 IN | WEIGHT: 130 LBS

## 2018-01-24 DIAGNOSIS — E89.0 POSTOPERATIVE HYPOTHYROIDISM: ICD-10-CM

## 2018-01-24 DIAGNOSIS — E78.00 PURE HYPERCHOLESTEROLEMIA: ICD-10-CM

## 2018-01-24 DIAGNOSIS — R05.3 CHRONIC COUGH: ICD-10-CM

## 2018-01-24 DIAGNOSIS — F33.42 RECURRENT MAJOR DEPRESSIVE DISORDER, IN FULL REMISSION (HCC): ICD-10-CM

## 2018-01-24 DIAGNOSIS — E55.9 VITAMIN D INSUFFICIENCY: ICD-10-CM

## 2018-01-24 DIAGNOSIS — M25.562 ACUTE PAIN OF LEFT KNEE: ICD-10-CM

## 2018-01-24 DIAGNOSIS — G43.009 MIGRAINE WITHOUT AURA AND WITHOUT STATUS MIGRAINOSUS, NOT INTRACTABLE: ICD-10-CM

## 2018-01-24 PROCEDURE — 99215 OFFICE O/P EST HI 40 MIN: CPT | Performed by: INTERNAL MEDICINE

## 2018-01-24 RX ORDER — IBUPROFEN 200 MG
200 TABLET ORAL EVERY 6 HOURS PRN
COMMUNITY
End: 2018-06-26

## 2018-01-24 RX ORDER — SUMATRIPTAN 50 MG/1
50 TABLET, FILM COATED ORAL
Qty: 10 TAB | Refills: 3 | Status: SHIPPED | OUTPATIENT
Start: 2018-01-24 | End: 2018-06-28 | Stop reason: SDUPTHER

## 2018-01-24 RX ORDER — MULTIVITAMIN WITH IRON
TABLET ORAL
COMMUNITY
End: 2021-08-31

## 2018-01-24 ASSESSMENT — PATIENT HEALTH QUESTIONNAIRE - PHQ9: CLINICAL INTERPRETATION OF PHQ2 SCORE: 0

## 2018-01-24 NOTE — ASSESSMENT & PLAN NOTE
Patient is taking levothyroxine 75 µg every morning. Recent thyroid function test showed a normal TSH and slightly elevated free T4. She denies side effects from taking the levothyroxine. She will continue levothyroxine with current dose.    Results for JOHN DAPHNELISANDRO CULLENER (MRN [id number]) as of 1/23/2018 20:40   Ref. Range 1/8/2018 08:18   TSH Latest Ref Range: 0.380 - 5.330 uIU/mL 2.430   Free T-4 Latest Ref Range: 0.53 - 1.43 ng/dL 1.44 (H)

## 2018-01-24 NOTE — ASSESSMENT & PLAN NOTE
Patient tries to control her mood without medication. She stated that she does not have any depressed mood currently and she wants to continue monitor without medication treatment. She denies suicidal ideation or plan.

## 2018-01-24 NOTE — LETTER
[email or web address]  Clarisse Toro M.D.  [address]  [place] [address]  Fax: [phone]   Authorization for Release/Disclosure of   Protected Health Information   Name: RADHA LAND : 1950 SSN: [id number]   Address: [address] [place] [address]   [place] [address] Phone:    [phone] (home) [phone] (work)   I authorize the entity listed below to release/disclose the PHI below to:   [email or web address] Toro M.D. and Clarises Toro M.D.   Provider or Entity Name:     Address   City, State, Zip   Phone:      Fax:     Reason for request: continuity of care   Information to be released:    [  ] LAST COLONOSCOPY,  including any PATH REPORT and follow-up  [  ] LAST FIT/COLOGUARD RESULT [  ] LAST DEXA  [  ] LAST MAMMOGRAM  [  ] LAST PAP  [  ] LAST LABS [  ] RETINA EXAM REPORT  [  ] IMMUNIZATION RECORDS  [  ] Release all info      [  ] Check here and initial the line next to each item to release ALL health information INCLUDING  _____ Care and treatment for drug and / or alcohol abuse  _____ HIV testing, infection status, or AIDS  _____ Genetic Testing    DATES OF SERVICE OR TIME PERIOD TO BE DISCLOSED: _____________  I understand and acknowledge that:  * This Authorization may be revoked at any time by you in writing, except if your health information has already been used or disclosed.  * Your health information that will be used or disclosed as a result of you signing this authorization could be re-disclosed by the recipient. If this occurs, your re-disclosed health information may no longer be protected by State or Federal laws.  * You may refuse to sign this Authorization. Your refusal will not affect your ability to obtain treatment.  * This Authorization becomes effective upon signing and will  on (date) __________.      If no date is indicated, this Authorization will  one (1) year from the signature date.    Name: Radha Land    Signature:   Date:     2018       PLEASE  FAX REQUESTED RECORDS BACK TO: [phone]

## 2018-01-24 NOTE — ASSESSMENT & PLAN NOTE
Patient is taking sumatriptan 50 mg as needed for migraine headache. She denies side effects from taking it. Her migraine is well controlled currently. Patient request to refill sumatriptan today.

## 2018-01-24 NOTE — ASSESSMENT & PLAN NOTE
Patient is taking simvastatin 20 mg every evening. She denies side effects from taking it. Her liver enzymes are normal. Cholesterol level is stable with current regimens.    Results for DAPHNE LOPEZ (MRN [id number]) as of 1/23/2018 20:40   Ref. Range 12/12/2016 06:59 6/2/2017 07:10 1/8/2018 08:18   Cholesterol,Tot Latest Ref Range: 100 - 199 mg/dL 197 193 205 (H)   Triglycerides Latest Ref Range: 0 - 149 mg/dL 52 47 72   HDL Latest Ref Range: >=40 mg/dL 82 83 85   LDL Latest Ref Range: <100 mg/dL 105 (H) 101 (H) 106 (H)

## 2018-01-24 NOTE — ASSESSMENT & PLAN NOTE
Patient has osteopenia . Patient is taking vitamin D 2000 units daily. Her vitamin D level was 41 on 1/8/18.

## 2018-01-25 ENCOUNTER — HOSPITAL ENCOUNTER (OUTPATIENT)
Dept: RADIOLOGY | Facility: MEDICAL CENTER | Age: 68
End: 2018-01-25
Attending: INTERNAL MEDICINE
Payer: MEDICARE

## 2018-01-25 DIAGNOSIS — M25.562 ACUTE PAIN OF LEFT KNEE: ICD-10-CM

## 2018-01-25 DIAGNOSIS — R05.3 CHRONIC COUGH: ICD-10-CM

## 2018-01-25 PROCEDURE — 71046 X-RAY EXAM CHEST 2 VIEWS: CPT

## 2018-01-25 PROCEDURE — 73564 X-RAY EXAM KNEE 4 OR MORE: CPT | Mod: LT

## 2018-01-25 NOTE — ASSESSMENT & PLAN NOTE
This is a new problem to me. Patient reports that she has chronic cough for 3 months. Her cough is dry and she coughs mostly in the morning. She denied wheezing or shortness of breath or chest congestion. Patient stated that she has allergy during fall and she takes Claritin during that time and she feels her cough better with Claritin as well. She is a former smoker and she quit smoking in 1985.

## 2018-01-25 NOTE — PROGRESS NOTES
Subjective:   Radha Land is a 68 y.o. female here today for evaluation and management of:      Pure hypercholesterolemia  Patient is taking simvastatin 20 mg every evening. She denies side effects from taking it. Her liver enzymes are normal. Cholesterol level is stable with current regimens.    Results for RADHA LAND (MRN [id number]) as of 1/23/2018 20:40   Ref. Range 12/12/2016 06:59 6/2/2017 07:10 1/8/2018 08:18   Cholesterol,Tot Latest Ref Range: 100 - 199 mg/dL 197 193 205 (H)   Triglycerides Latest Ref Range: 0 - 149 mg/dL 52 47 72   HDL Latest Ref Range: >=40 mg/dL 82 83 85   LDL Latest Ref Range: <100 mg/dL 105 (H) 101 (H) 106 (H)       Vitamin D insufficiency  Patient has osteopenia . Patient is taking vitamin D 2000 units daily. Her vitamin D level was 41 on 1/8/18.    Postoperative hypothyroidism  Patient is taking levothyroxine 75 µg every morning. Recent thyroid function test showed a normal TSH and slightly elevated free T4. She denies side effects from taking the levothyroxine. She will continue levothyroxine with current dose.    Results for RADHA LAND (MRN [id number]) as of 1/23/2018 20:40   Ref. Range 1/8/2018 08:18   TSH Latest Ref Range: 0.380 - 5.330 uIU/mL 2.430   Free T-4 Latest Ref Range: 0.53 - 1.43 ng/dL 1.44 (H)       Migraine without aura and without status migrainosus, not intractable  Patient is taking sumatriptan 50 mg as needed for migraine headache. She denies side effects from taking it. Her migraine is well controlled currently. Patient request to refill sumatriptan today.    Recurrent major depressive disorder, in full remission (CMS-HCC)  Patient tries to control her mood without medication. She stated that she does not have any depressed mood currently and she wants to continue monitor without medication treatment. She denies suicidal ideation or plan.    Chronic cough  This is a new problem to me. Patient reports that she has chronic cough for 3 months.  Her cough is dry and she coughs mostly in the morning. She denied wheezing or shortness of breath or chest congestion. Patient stated that she has allergy during fall and she takes Claritin during that time and she feels her cough better with Claritin as well. She is a former smoker and she quit smoking in 1985.    Acute pain of left knee  Patient reported having pain on left knee for one month. She noticed pain on certain position during doing yoga for exercise. Pain is intermittency and 5 out of 10 in severity. She tried over-the-counter ibuprofen and pain improved with ibuprofen. She denies injury to her left knee.         Current medicines (including changes today)  Current Outpatient Prescriptions   Medication Sig Dispense Refill   • Multiple Vitamins-Minerals (HAIR/SKIN/NAILS/BIOTIN PO) Take  by mouth.     • Magnesium 250 MG Tab Take  by mouth.     • ibuprofen (MOTRIN) 200 MG Tab Take 200 mg by mouth every 6 hours as needed.     • sumatriptan (IMITREX) 50 MG Tab Take 1 Tab by mouth Once PRN. 10 Tab 3   • beclomethasone (QVAR) 80 MCG/ACT inhaler Inhale 1 Puff by mouth 2 times a day. 7.3 g 3   • levothyroxine (SYNTHROID) 75 MCG Tab Take 1 Tab by mouth Every morning on an empty stomach. 90 Tab 3   • simvastatin (ZOCOR) 20 MG Tab Take 1 Tab by mouth every evening. 90 Tab 3   • B Complex-C (SUPER B COMPLEX PO) Take 1 Tab by mouth every day.     • Ascorbic Acid (VITAMIN C) 1000 MG Tab Take 1 Tab by mouth every day.     • Calcium Carbonate-Vitamin D (CALCIUM-CARB 600 + D PO) Take 1 Tab by mouth every day.     • Probiotic Product (PROBIOTIC ADVANCED PO) Take 1 Tab by mouth every day.     • Multiple Vitamins-Minerals (MULTIVITAMIN PO) Take 1 Tab by mouth every day.     • Coenzyme Q10-Levocarnitine (CO Q-10 PLUS PO) Take 1 Tab by mouth every day.     • docusate sodium (COLACE) 250 MG capsule Take 250 mg by mouth every day.       No current facility-administered medications for this visit.      She  has a past medical  "history of Hyperlipidemia; Migraine; and Thyroid disease.    ROS   No chest pain, no shortness of breath, no abdominal pain       Objective:     Blood pressure 120/72, pulse 75, temperature 36.4 °C (97.6 °F), height 1.626 m (5' 4\"), weight 59 kg (130 lb), SpO2 96 %. Body mass index is 22.31 kg/m².   Physical Exam:  General: Alert, oriented and no acute distress.  Eye contact is good, speech goal directed, affect calm  HEENT: conjunctiva non-injected, sclera non-icteric.  Oral mucous membranes pink and moist with no lesions.  Pinna normal.   Lungs: Normal respiratory effort, clear to auscultation bilaterally with good excursion.  CV: regular rate and rhythm. No murmurs.   Abdomen: soft, non distended, nontender, Bowel sound normal.  Ext: no edema, color normal, vascularity normal, temperature normal        Assessment and Plan:   The following treatment plan was discussed     1. Pure hypercholesterolemia  - Well-controlled. Continue current regimens. Recheck lab 1-2 weeks before next follow up visit.  - Advised to eat low fat, low carbohydrate and high fiber diet as well as do cardio physical exercise regularly.   - COMP METABOLIC PANEL; Future  - LIPID PROFILE; Future    2. Vitamin D insufficiency  - Improved. Discussed to take vitamin D 2000 units daily. Recheck vitamin D in 6 months.  - VITAMIN D,25 HYDROXY; Future    3. Postoperative hypothyroidism  - Slightly high free T4, but TSH normal. Continue levothyroxine 75 µg every morning with empty stomach.  - TSH; Future  - FREE THYROXINE; Future    4. Migraine without aura and without status migrainosus, not intractable  - Well-controlled. Continue current regimens. Recheck lab 1-2 weeks before next follow up visit. Refill Imitrex.  - sumatriptan (IMITREX) 50 MG Tab; Take 1 Tab by mouth Once PRN.  Dispense: 10 Tab; Refill: 3    5. Recurrent major depressive disorder, in full remission (CMS-HCC)  - Well-controlled. Patient does not need to take medication.. Continue to " monitor     6. Chronic cough  - Discuss possible causes with patient including but not limited to postnasal drip versus acid reflux versus allergic airway disease or underlying lung pathology.  - Order chest x-ray for further evaluation. Will advise for result.  - Discussed at length to rinse sinuses with over the counter nasal wash or Netti pot once or twice a day.  - Gargle throat with warm salt water twice a day.   - Advised to increase water intake.  - Advised to try Qvar inhaler for temporary for 3-4 weeks only.  - Advised to take Claritin 10 mg daily for 5-7 days.   - beclomethasone (QVAR) 80 MCG/ACT inhaler; Inhale 1 Puff by mouth 2 times a day.  Dispense: 7.3 g; Refill: 3  - DX-CHEST-2 VIEWS; Future    7. Acute pain of left knee  - Discussed rest, ice, knee brace, knee exercise, low dose ibuprofen with meals as needed.  - Order left knee x-ray and with advice for results.  - DX-KNEE COMPLETE 4+ LEFT; Future    8. Health Maintenance   - Patient has colonoscopy with GI consultant in June 2017. We will request the report from GI consultant.    Face-to-face time spent 40 minutes with patient and more than half of that time spent for counseling and cooperating of care for medical problems listed above.       Followup: Return in about 6 months (around 7/24/2018), or if symptoms worsen or fail to improve, for hypothyroid, hyperlipidemia, migraine, depression, vitamin D insufficiency, cough, lab review.      Please note that this dictation was created using voice recognition software. I have made every reasonable attempt to correct obvious errors, but I expect that there may have unintended errors in text, spelling, punctuation, or grammar that I did not discover.

## 2018-01-25 NOTE — ASSESSMENT & PLAN NOTE
Patient reported having pain on left knee for one month. She noticed pain on certain position during doing yoga for exercise. Pain is intermittency and 5 out of 10 in severity. She tried over-the-counter ibuprofen and pain improved with ibuprofen. She denies injury to her left knee.

## 2018-01-29 PROBLEM — M17.12 PRIMARY OSTEOARTHRITIS OF LEFT KNEE: Status: ACTIVE | Noted: 2018-01-24

## 2018-01-30 ENCOUNTER — OFFICE VISIT (OUTPATIENT)
Dept: MEDICAL GROUP | Age: 68
End: 2018-01-30
Payer: MEDICARE

## 2018-01-30 VITALS
SYSTOLIC BLOOD PRESSURE: 106 MMHG | WEIGHT: 133.8 LBS | OXYGEN SATURATION: 97 % | HEIGHT: 64 IN | BODY MASS INDEX: 22.84 KG/M2 | TEMPERATURE: 98.2 F | DIASTOLIC BLOOD PRESSURE: 58 MMHG | HEART RATE: 86 BPM

## 2018-01-30 DIAGNOSIS — R05.3 CHRONIC COUGH: ICD-10-CM

## 2018-01-30 DIAGNOSIS — G43.009 MIGRAINE WITHOUT AURA AND WITHOUT STATUS MIGRAINOSUS, NOT INTRACTABLE: ICD-10-CM

## 2018-01-30 DIAGNOSIS — M85.80 OSTEOPENIA WITH HIGH RISK OF FRACTURE: ICD-10-CM

## 2018-01-30 DIAGNOSIS — E78.00 PURE HYPERCHOLESTEROLEMIA: ICD-10-CM

## 2018-01-30 DIAGNOSIS — M17.12 PRIMARY OSTEOARTHRITIS OF LEFT KNEE: ICD-10-CM

## 2018-01-30 DIAGNOSIS — Z00.00 MEDICARE ANNUAL WELLNESS VISIT, INITIAL: ICD-10-CM

## 2018-01-30 DIAGNOSIS — E89.0 POSTOPERATIVE HYPOTHYROIDISM: ICD-10-CM

## 2018-01-30 DIAGNOSIS — F33.42 RECURRENT MAJOR DEPRESSIVE DISORDER, IN FULL REMISSION (HCC): ICD-10-CM

## 2018-01-30 DIAGNOSIS — E55.9 VITAMIN D INSUFFICIENCY: ICD-10-CM

## 2018-01-30 PROCEDURE — G0439 PPPS, SUBSEQ VISIT: HCPCS | Performed by: INTERNAL MEDICINE

## 2018-01-30 ASSESSMENT — PATIENT HEALTH QUESTIONNAIRE - PHQ9: CLINICAL INTERPRETATION OF PHQ2 SCORE: 0

## 2018-01-30 NOTE — PROGRESS NOTES
Chief Complaint   Patient presents with   • Annual Wellness Visit     SCP         HPI:  Radha is a 68 y.o. here for Medicare Annual Wellness Visit        Patient Active Problem List    Diagnosis Date Noted   • Chronic cough 01/24/2018   • Primary osteoarthritis of left knee 01/24/2018   • Hemorrhoids 09/12/2017   • Vitamin D insufficiency 06/13/2017   • Recurrent major depressive disorder, in full remission (CMS-HCC) 11/14/2016   • Migraine without aura and without status migrainosus, not intractable 06/01/2015   • Osteopenia with high risk of fracture 06/01/2015   • Postoperative hypothyroidism 09/02/2014   • Pure hypercholesterolemia 09/02/2014   • History of Sweet syndrome 09/02/2014       Current Outpatient Prescriptions   Medication Sig Dispense Refill   • aspirin 81 MG tablet Take 81 mg by mouth every day.     • Multiple Vitamins-Minerals (HAIR/SKIN/NAILS/BIOTIN PO) Take  by mouth.     • Magnesium 250 MG Tab Take  by mouth.     • ibuprofen (MOTRIN) 200 MG Tab Take 200 mg by mouth every 6 hours as needed.     • sumatriptan (IMITREX) 50 MG Tab Take 1 Tab by mouth Once PRN. 10 Tab 3   • beclomethasone (QVAR) 80 MCG/ACT inhaler Inhale 1 Puff by mouth 2 times a day. 7.3 g 3   • levothyroxine (SYNTHROID) 75 MCG Tab Take 1 Tab by mouth Every morning on an empty stomach. 90 Tab 3   • simvastatin (ZOCOR) 20 MG Tab Take 1 Tab by mouth every evening. 90 Tab 3   • B Complex-C (SUPER B COMPLEX PO) Take 1 Tab by mouth every day.     • Ascorbic Acid (VITAMIN C) 1000 MG Tab Take 1 Tab by mouth every day.     • Calcium Carbonate-Vitamin D (CALCIUM-CARB 600 + D PO) Take 1 Tab by mouth every day.     • Probiotic Product (PROBIOTIC ADVANCED PO) Take 1 Tab by mouth every day.     • Multiple Vitamins-Minerals (MULTIVITAMIN PO) Take 1 Tab by mouth every day.     • Coenzyme Q10-Levocarnitine (CO Q-10 PLUS PO) Take 1 Tab by mouth every day.     • docusate sodium (COLACE) 250 MG capsule Take 250 mg by mouth every day.       No current  facility-administered medications for this visit.         Patient is taking medications as noted in medication list.  Current supplements as per medication list.     Allergies: Patient has no known allergies.    Current social contact/activities: Yazidism/book club/swimming     Is patient current with immunizations? Yes.    She  reports that she quit smoking about 32 years ago. She has a 7.50 pack-year smoking history. She has never used smokeless tobacco. She reports that she drinks about 2.4 oz of alcohol per week . She reports that she does not use drugs.  Counseling given: Yes        DPA/Advanced directive: Patient has Advanced Directive on file.     ROS:    Gait: Uses no assistive device   Ostomy: no   Other tubes: no   Amputations: no   Chronic oxygen use no   Last eye exam 6/17   Wears hearing aids: no   : Denies any urinary leakage during the last 6 months      Screening:        Depression Screening    Little interest or pleasure in doing things?  0 - not at all  Feeling down, depressed, or hopeless? 0 - not at all  Patient Health Questionnaire Score: 0    If depressive symptoms identified deferred to follow up visit unless specifically addressed in assessment and plan.    Interpretation of PHQ-9 Total Score   Score Severity   1-4 No Depression   5-9 Mild Depression   10-14 Moderate Depression   15-19 Moderately Severe Depression   20-27 Severe Depression    Screening for Cognitive Impairment    Three Minute Recall (apple, watch, ray)   /3 Banana sunrise fence  3/3      Draw clock face with all 12 numbers set to the hand to show 10 minutes past 11 o'clock  1 5/5  If cognitive concerns identified, deferred for follow up unless specifically addressed in assessment and plan.    Fall Risk Assessment    Has the patient had two or more falls in the last year or any fall with injury in the last year?  No  If fall risk identified, deferred for follow up unless specifically addressed in assessment and  plan.    Safety Assessment    Throw rugs on floor.  Yes  Handrails on all stairs.  Yes  Good lighting in all hallways.  Yes  Difficulty hearing.  No  Patient counseled about all safety risks that were identified.    Functional Assessment ADLs    Are there any barriers preventing you from cooking for yourself or meeting nutritional needs?  No.    Are there any barriers preventing you from driving safely or obtaining transportation?  No.    Are there any barriers preventing you from using a telephone or calling for help?  No.    Are there any barriers preventing you from shopping?  No.    Are there any barriers preventing you from taking care of your own finances?  No.    Are there any barriers preventing you from managing your medications?  No.    Are you currently engaging any exercise or physical activity?  Yes.  Swim/walk/yoga    Health Maintenance Summary                Annual Wellness Visit Overdue 1950     COLONOSCOPY Overdue 11/15/2017      Done 11/15/2007 AMB REFERRAL TO GI FOR COLONOSCOPY    MAMMOGRAM Next Due 1/9/2019      Done 1/9/2018 MA-MAMMO SCREENING BILAT W/TOMOSYNTHESIS W/CAD     Patient has more history with this topic...    BONE DENSITY Next Due 11/11/2020      Done 11/11/2015 DS-BONE DENSITY STUDY (DEXA)     Patient has more history with this topic...    IMM DTaP/Tdap/Td Vaccine Next Due 9/2/2024      Done 9/2/2014 Imm Admin: Tdap Vaccine          Patient Care Team:  Clarisse Toro M.D. as PCP - General (Internal Medicine)  Johnny You M.D. as Consulting Physician (Gastroenterology)  SANDOVAL Morales as Mid Level Provider (Dermatology)    Social History   Substance Use Topics   • Smoking status: Former Smoker     Packs/day: 0.50     Years: 15.00     Quit date: 6/1/1985   • Smokeless tobacco: Never Used      Comment: smoked for 14 years 7 pack year history   • Alcohol use 2.4 oz/week     4 Glasses of wine per week      Comment: 1 glass of beer a day     Family History   Problem  "Relation Age of Onset   • Hyperlipidemia Mother    • Thyroid Mother      Hypothyroid   • Cancer Mother 90     uterine cancer   • Diabetes Father    • Heart Failure Father    • Heart Disease Father      Rhumatic vavular heart disease   • Other Father      rheumatic fever   • Diabetes Sister    • Cancer Other      breast   • Diabetes Brother    • Cancer Maternal Grandmother      uterine     She  has a past medical history of Hyperlipidemia; Migraine; and Thyroid disease.   Past Surgical History:   Procedure Laterality Date   • HEMORRHOIDECTOMY  9/12/2017    Procedure: HEMORRHOIDECTOMY- ARTERY LIGATION, RECTAL ANO REPAIR;  Surgeon: Aleksandr Quinteros M.D.;  Location: SURGERY [facility];  Service: General   • ABDOMINAL HYSTERECTOMY TOTAL      Fibroids // 1990   • OTHER      hemrroidectomy   • OTHER ORTHOPEDIC SURGERY      surgery to big toe   • THYROIDECTOMY      partial           Exam:     Blood pressure 106/58, pulse 86, temperature 36.8 °C (98.2 °F), height 1.626 m (5' 4\"), weight 60.7 kg (133 lb 12.8 oz), SpO2 97 %. Body mass index is 22.97 kg/m².    Hearing good.    Dentition good  Alert, oriented in no acute distress.  Eye contact is good, speech goal directed, affect calm      Assessment and Plan. The following treatment and monitoring plan is recommended:    1. Medicare annual wellness visit, initial  Initial Wellness Visit - Includes PPPS ()     2. Pure hypercholesterolemia  Initial Wellness Visit - Includes PPPS ()    Well-controlled. Continue simvastatin 20 mg every evening. Advised to eat low fat, low carbohydrate and high fiber diet as well as do cardio physical exercises regularly.      3. Postoperative hypothyroidism  Initial Wellness Visit - Includes PPPS ()    Well-controlled. Continue levothyroxine 75 µg every morning on empty stomach. Recheck lab in 6 months.     4. Recurrent major depressive disorder, in full remission (CMS-HCC)  Initial Wellness Visit - Includes PPPS ()    " Patient denies depressed mood and does not require to take medication. She is on remission. Not on medication. Continue to monitor.     5. Osteopenia with high risk of fracture  Initial Wellness Visit - Includes PPPS ()    Chronic and stable. Patient does not want to be treated with bisphosphonates or other alternating medicine. She wants to continue vitamin D and calcium supplement.      6. Migraine without aura and without status migrainosus, not intractable  Initial Wellness Visit - Includes PPPS ()    Well-controlled. Continue Imitrex as needed. Reviewed potential side effects of Imitrex with patient.     7. Chronic cough  Initial Wellness Visit - Includes PPPS ()    Chest x-ray on 1/25/18 was negative. Symptoms improved with sinus rinse with nasal wash and gargling throat with warm salt water. Will hold for Qvar inhaler. Patient will use Qvar inhaler only if her symptoms do not improve with conservative treatment. Patient is advised to rinse oral cavity after using Qvar inhaler.     8. Primary osteoarthritis of left knee  Initial Wellness Visit - Includes PPPS ()    Left knee x-ray on 1/25/18 showed mild osteoarthritis. Continue conservative treatment, over-the-counter ibuprofen low-dose with meal as needed only. Continue to monitor.      9. Vitamin D insufficiency  Initial Wellness Visit - Includes PPPS ()    Well-controlled. Continue vitamin D 2000 units daily.           Services suggested: No services needed at this time  Health Care Screening recommendations as per orders if indicated.  Referrals offered: PT/OT/Nutrition counseling/Behavioral Health/Smoking cessation as per orders if indicated.    Discussion today about general wellness and lifestyle habits:    · Prevent falls and reduce trip hazards; Cautioned about securing or removing rugs.  · Have a working fire alarm and carbon monoxide detector;   · Engage in regular physical activity and social activities       Follow-up:  Return in about 6 months (around 7/24/2018), or if symptoms worsen or fail to improve, for hypercholesterolemia, hypothyroid, migraine, osteopenia, vitamin D insufficiency, lab review.

## 2018-02-27 ENCOUNTER — PATIENT OUTREACH (OUTPATIENT)
Dept: HEALTH INFORMATION MANAGEMENT | Facility: OTHER | Age: 68
End: 2018-02-27

## 2018-02-27 NOTE — PROGRESS NOTES
1. Attempt #: 1    2. HealthConnect Verified: yes    3. Verify PCP: yes    4. Care Team Updated:       •   DME Company (gait device, O2, CPAP, etc.): NO       •   Other Specialists (eye doctor, derm, GYN, cardiology, endo, etc): NO    5.  Reviewed/Updated the following with patient:       •   Communication Preference Obtained? YES       •   Preferred Pharmacy? NO       •   Preferred Lab? NO       •   Family History (document living status of immediate family members and if + hx of cancer, diabetes, hypertension, hyperlipidemia, heart attack, stroke) NO    6. [employer]: already active    7. [employer]: no    8. Annual Wellness Visit Scheduling  Scheduling Status:Not Scheduled. Patient states they see PCP regularly - RECENTLY COMPLETED 1/30/18       9. Care Gap Scheduling (Attempt to Schedule EACH Overdue Care Gap!)     There are no preventive care reminders to display for this patient.       10. Patient was advised: “This is a free wellness visit. The provider will screen for medical conditions to help you stay healthy. If you have other concerns to address you may be asked to discuss these at a separate visit or there may be an additional fee.”     11. Patient was informed to arrive 15 min prior to their scheduled appointment and bring in their medication bottles.

## 2018-05-08 ENCOUNTER — PATIENT MESSAGE (OUTPATIENT)
Dept: MEDICAL GROUP | Age: 68
End: 2018-05-08

## 2018-05-08 DIAGNOSIS — M17.12 PRIMARY OSTEOARTHRITIS OF LEFT KNEE: ICD-10-CM

## 2018-06-12 ENCOUNTER — HOSPITAL ENCOUNTER (OUTPATIENT)
Dept: LAB | Facility: MEDICAL CENTER | Age: 68
End: 2018-06-12
Attending: INTERNAL MEDICINE
Payer: MEDICARE

## 2018-06-12 DIAGNOSIS — E55.9 VITAMIN D INSUFFICIENCY: ICD-10-CM

## 2018-06-12 DIAGNOSIS — E78.00 PURE HYPERCHOLESTEROLEMIA: ICD-10-CM

## 2018-06-12 DIAGNOSIS — E89.0 POSTOPERATIVE HYPOTHYROIDISM: ICD-10-CM

## 2018-06-12 LAB
25(OH)D3 SERPL-MCNC: 41 NG/ML (ref 30–100)
ALBUMIN SERPL BCP-MCNC: 3.9 G/DL (ref 3.2–4.9)
ALBUMIN/GLOB SERPL: 1.2 G/DL
ALP SERPL-CCNC: 59 U/L (ref 30–99)
ALT SERPL-CCNC: 14 U/L (ref 2–50)
ANION GAP SERPL CALC-SCNC: 8 MMOL/L (ref 0–11.9)
AST SERPL-CCNC: 18 U/L (ref 12–45)
BILIRUB SERPL-MCNC: 0.7 MG/DL (ref 0.1–1.5)
BUN SERPL-MCNC: 14 MG/DL (ref 8–22)
CALCIUM SERPL-MCNC: 9.4 MG/DL (ref 8.5–10.5)
CHLORIDE SERPL-SCNC: 103 MMOL/L (ref 96–112)
CHOLEST SERPL-MCNC: 205 MG/DL (ref 100–199)
CO2 SERPL-SCNC: 26 MMOL/L (ref 20–33)
CREAT SERPL-MCNC: 0.98 MG/DL (ref 0.5–1.4)
GLOBULIN SER CALC-MCNC: 3.3 G/DL (ref 1.9–3.5)
GLUCOSE SERPL-MCNC: 74 MG/DL (ref 65–99)
HDLC SERPL-MCNC: 79 MG/DL
LDLC SERPL CALC-MCNC: 112 MG/DL
POTASSIUM SERPL-SCNC: 4.2 MMOL/L (ref 3.6–5.5)
PROT SERPL-MCNC: 7.2 G/DL (ref 6–8.2)
SODIUM SERPL-SCNC: 137 MMOL/L (ref 135–145)
T4 FREE SERPL-MCNC: 1.25 NG/DL (ref 0.53–1.43)
TRIGL SERPL-MCNC: 70 MG/DL (ref 0–149)
TSH SERPL DL<=0.005 MIU/L-ACNC: 1.63 UIU/ML (ref 0.38–5.33)

## 2018-06-12 PROCEDURE — 84439 ASSAY OF FREE THYROXINE: CPT

## 2018-06-12 PROCEDURE — 36415 COLL VENOUS BLD VENIPUNCTURE: CPT

## 2018-06-12 PROCEDURE — 84443 ASSAY THYROID STIM HORMONE: CPT

## 2018-06-12 PROCEDURE — 82306 VITAMIN D 25 HYDROXY: CPT

## 2018-06-12 PROCEDURE — 80053 COMPREHEN METABOLIC PANEL: CPT

## 2018-06-12 PROCEDURE — 80061 LIPID PANEL: CPT

## 2018-06-25 ENCOUNTER — TELEPHONE (OUTPATIENT)
Dept: MEDICAL GROUP | Age: 68
End: 2018-06-25

## 2018-06-25 NOTE — TELEPHONE ENCOUNTER
Future Appointments       Provider Department Center    6/26/2018 9:00 AM Clarisse Toro M.D. [facility] [address]        ESTABLISHED PATIENT PRE-VISIT PLANNING     Note: Patient will not be contacted if there is no indication to call.     1.  Reviewed notes from the last few office visits within the medical group: Yes    2.  If any orders were placed at last visit or intended to be done for this visit (i.e. 6 mos follow-up), do we have Results/Consult Notes?        •  Labs - Labs ordered, completed on 6/12/18 and results are in chart.   Note: If patient appointment is for lab review and patient did not complete labs, check with provider if OK to reschedule patient until labs completed.       •  Imaging - Imaging was not ordered at last office visit.       •  Referrals - Referral ordered, patient has NOT been seen.    3. Is this appointment scheduled as a Hospital Follow-Up? No    4.  Immunizations were updated in Epic using WebIZ?: Epic matches WebIZ       •  Web Iz Recommendations: Patient is up to date on all vaccines    5.  Patient is due for the following Health Maintenance Topics:   There are no preventive care reminders to display for this patient.    - Patient is up-to-date on all Health Maintenance topics. No records have been requested at this time.    6.  MDX printed for Provider? NO    7.  Patient was NOT informed to arrive 15 min prior to their scheduled appointment and bring in their medication bottles.

## 2018-06-26 ENCOUNTER — OFFICE VISIT (OUTPATIENT)
Dept: MEDICAL GROUP | Age: 68
End: 2018-06-26
Payer: MEDICARE

## 2018-06-26 VITALS
WEIGHT: 129.6 LBS | BODY MASS INDEX: 22.13 KG/M2 | HEIGHT: 64 IN | HEART RATE: 80 BPM | DIASTOLIC BLOOD PRESSURE: 64 MMHG | TEMPERATURE: 98.6 F | OXYGEN SATURATION: 97 % | SYSTOLIC BLOOD PRESSURE: 118 MMHG

## 2018-06-26 DIAGNOSIS — E89.0 POSTOPERATIVE HYPOTHYROIDISM: ICD-10-CM

## 2018-06-26 DIAGNOSIS — E78.00 PURE HYPERCHOLESTEROLEMIA: ICD-10-CM

## 2018-06-26 DIAGNOSIS — E55.9 VITAMIN D INSUFFICIENCY: ICD-10-CM

## 2018-06-26 DIAGNOSIS — R94.4 DECREASED GFR: ICD-10-CM

## 2018-06-26 DIAGNOSIS — F33.42 RECURRENT MAJOR DEPRESSIVE DISORDER, IN FULL REMISSION (HCC): ICD-10-CM

## 2018-06-26 PROBLEM — R05.3 CHRONIC COUGH: Status: RESOLVED | Noted: 2018-01-24 | Resolved: 2018-06-26

## 2018-06-26 PROBLEM — K64.9 HEMORRHOIDS: Status: RESOLVED | Noted: 2017-09-12 | Resolved: 2018-06-26

## 2018-06-26 PROCEDURE — 99214 OFFICE O/P EST MOD 30 MIN: CPT | Performed by: INTERNAL MEDICINE

## 2018-06-26 RX ORDER — LEVOTHYROXINE SODIUM 0.07 MG/1
75 TABLET ORAL
Qty: 90 TAB | Refills: 3 | Status: SHIPPED | OUTPATIENT
Start: 2018-06-26 | End: 2018-07-02 | Stop reason: SDUPTHER

## 2018-06-26 RX ORDER — SIMVASTATIN 20 MG
20 TABLET ORAL EVERY EVENING
Qty: 90 TAB | Refills: 3 | Status: SHIPPED | OUTPATIENT
Start: 2018-06-26 | End: 2018-07-02 | Stop reason: SDUPTHER

## 2018-06-26 NOTE — PROGRESS NOTES
Subjective:   Radha Land is a 68 y.o. female here today for evaluation and management of:      Vitamin D insufficiency  Patient is taking vitamin D 2000 units daily with calcium supplements.  Her vitamin D level is stable and well controlled with supplements.  Recent vitamin D level is 41 on 6/12/18.    Pure hypercholesterolemia  Patient was taking simvastatin 20 mg every evening until 2 weeks ago she tried to stop taking it due to muscle cramp.  Patient states that she will go back to take simvastatin again.  She also takes magnesium and coenzyme Q 10 to prevent muscle cramp.  She does not want to increase the dose of simvastatin.  Her LDL cholesterol increased from 106 to 112 in recent blood tests.  She has normal liver enzymes.  I reviewed her blood test result with her in clinic today.    Results for RADHA LAND (MRN [id number]) as of 6/26/2018 10:16   Ref. Range 6/2/2017 07:10 1/8/2018 08:18 6/12/2018 08:00   Cholesterol,Tot Latest Ref Range: 100 - 199 mg/dL 193 205 (H) 205 (H)   Triglycerides Latest Ref Range: 0 - 149 mg/dL 47 72 70   HDL Latest Ref Range: >=40 mg/dL 83 85 79   LDL Latest Ref Range: <100 mg/dL 101 (H) 106 (H) 112 (H)       Postoperative hypothyroidism  Patient is taking levothyroxine 75 mcg every morning on empty stomach.  Her thyroid function tests are stable and well controlled with current regimens.  She denies side effects from taking it.  I reviewed her blood tests with her in clinic today.    Results for RADHA LAND (MRN [id number]) as of 6/26/2018 10:16   Ref. Range 1/8/2018 08:18 6/12/2018 08:00   TSH Latest Ref Range: 0.380 - 5.330 uIU/mL 2.430 1.630   Free T-4 Latest Ref Range: 0.53 - 1.43 ng/dL 1.44 (H) 1.25       Decreased GFR  Patient has slightly low GFR at 56.  She states that she has been taking ibuprofen in the past 2 weeks for left knee pain.  She already stopped taking ibuprofen.  She states that she will try to increase water intake.  She follows with  orthopedist for her left knee and she is not taking any NSAIDs currently.    Results for DAPHNE LOPEZ (MRN [id number]) as of 6/26/2018 10:16   Ref. Range 9/13/2017 07:20 1/8/2018 08:18 6/12/2018 08:00   Bun Latest Ref Range: 8 - 22 mg/dL 7 (L) 10 14   Creatinine Latest Ref Range: 0.50 - 1.40 mg/dL 0.86 0.93 0.98   GFR If  Latest Ref Range: >60 mL/min/1.73 m 2 >60 >60 >60   GFR If Non  Latest Ref Range: >60 mL/min/1.73 m 2 >60 60 56 (A)       Recurrent major depressive disorder, in full remission (CMS-HCC)  Patient states that her mood is stable and she does not feel depressed or anxious.  She is not taking any medication currently.  She wants to continue to control her mood with diet and exercise.  She denied suicidal ideation or plan or homicidal ideation plan.         Current medicines (including changes today)  Current Outpatient Prescriptions   Medication Sig Dispense Refill   • hydrocortisone-pramoxine (ANALPRAM-HC) 2.5-1 % rectal cream hydrocortisone-pramoxine 2.5 %-1 % rectal cream     • levothyroxine (SYNTHROID) 75 MCG Tab Take 1 Tab by mouth Every morning on an empty stomach. 90 Tab 3   • simvastatin (ZOCOR) 20 MG Tab Take 1 Tab by mouth every evening. 90 Tab 3   • aspirin 81 MG tablet Take 81 mg by mouth every day.     • Multiple Vitamins-Minerals (HAIR/SKIN/NAILS/BIOTIN PO) Take  by mouth.     • Magnesium 250 MG Tab Take  by mouth.     • sumatriptan (IMITREX) 50 MG Tab Take 1 Tab by mouth Once PRN. 10 Tab 3   • B Complex-C (SUPER B COMPLEX PO) Take 1 Tab by mouth every day.     • Ascorbic Acid (VITAMIN C) 1000 MG Tab Take 1 Tab by mouth every day.     • Calcium Carbonate-Vitamin D (CALCIUM-CARB 600 + D PO) Take 1 Tab by mouth every day.     • Probiotic Product (PROBIOTIC ADVANCED PO) Take 1 Tab by mouth every day.     • Multiple Vitamins-Minerals (MULTIVITAMIN PO) Take 1 Tab by mouth every day.     • Coenzyme Q10-Levocarnitine (CO Q-10 PLUS PO) Take 1 Tab by mouth  "every day.     • docusate sodium (COLACE) 250 MG capsule Take 250 mg by mouth every day.       No current facility-administered medications for this visit.      She  has a past medical history of Hyperlipidemia; Migraine; and Thyroid disease.    ROS   No chest pain, no shortness of breath, no abdominal pain       Objective:     Blood pressure 118/64, pulse 80, temperature 37 °C (98.6 °F), height 1.618 m (5' 3.7\"), weight 58.8 kg (129 lb 9.6 oz), SpO2 97 %. Body mass index is 22.46 kg/m².   Physical Exam:  General: Alert, oriented and no acute distress.  Eye contact is good, speech goal directed, affect calm  HEENT: conjunctiva non-injected, sclera non-icteric.  Oral mucous membranes pink and moist with no lesions.  Pinna normal.   Lungs: Normal respiratory effort, clear to auscultation bilaterally with good excursion.  CV: regular rate and rhythm. No murmurs.   Abdomen: soft, non distended, nontender, Bowel sound normal.  Ext: no edema, color normal, vascularity normal, temperature normal        Assessment and Plan:   The following treatment plan was discussed     1. Vitamin D insufficiency  - Well-controlled. Continue vitamin D 2000 units daily.    2. Pure hypercholesterolemia  - Slightly elevated LDL cholesterol on recent blood test.  Continue simvastatin 20 mg 1 tablets every evening.  Review potential side effects of simvastatin with patient.  - Advised to eat low fat, low carbohydrate and high fiber diet as well as do cardio physical exercise regularly.  - simvastatin (ZOCOR) 20 MG Tab; Take 1 Tab by mouth every evening.  Dispense: 90 Tab; Refill: 3  - COMP METABOLIC PANEL; Future  - LIPID PROFILE; Future    3. Postoperative hypothyroidism  - Well-controlled. Continue current regimens, levothyroxine 75 mcg 1 tablets every morning on empty stomach. Recheck lab 1-2 weeks before next follow up visit.  - levothyroxine (SYNTHROID) 75 MCG Tab; Take 1 Tab by mouth Every morning on an empty stomach.  Dispense: 90 Tab; " Refill: 3  - CBC WITH DIFFERENTIAL; Future  - TSH; Future  - FREE THYROXINE; Future    4. Decreased GFR  - Recommend to avoid NSAIDs and increase water intake.  Recheck kidney function in 6 months.    5. Recurrent major depressive disorder, in full remission (HCC)  - Well-controlled.  Patient does not have any depressed mood.  She is not taking any medication currently.  Patient wants to continue nonpharmacological treatment for depressed mood.  She denies suicidal ideation or plan or homicidal ideation or plan.  She is advised to seek urgent medical attention if she has depressed mood or any thoughts of harming herself or other.  Patient understands and agrees with the plan        Followup: Return in about 6 months (around 12/26/2018), or if symptoms worsen or fail to improve, for Hyperlipidemia, Hypothyroid, Vitamin D insufficiency, decreased GFR, Lab review.      Please note that this dictation was created using voice recognition software. I have made every reasonable attempt to correct obvious errors, but I expect that there may have unintended errors in text, spelling, punctuation, or grammar that I did not discover.

## 2018-06-26 NOTE — ASSESSMENT & PLAN NOTE
Patient was taking simvastatin 20 mg every evening until 2 weeks ago she tried to stop taking it due to muscle cramp.  Patient states that she will go back to take simvastatin again.  She also takes magnesium and coenzyme Q 10 to prevent muscle cramp.  She does not want to increase the dose of simvastatin.  Her LDL cholesterol increased from 106 to 112 in recent blood tests.  She has normal liver enzymes.  I reviewed her blood test result with her in clinic today.    Results for DAPHNE LOPEZ (MRN [id number]) as of 6/26/2018 10:16   Ref. Range 6/2/2017 07:10 1/8/2018 08:18 6/12/2018 08:00   Cholesterol,Tot Latest Ref Range: 100 - 199 mg/dL 193 205 (H) 205 (H)   Triglycerides Latest Ref Range: 0 - 149 mg/dL 47 72 70   HDL Latest Ref Range: >=40 mg/dL 83 85 79   LDL Latest Ref Range: <100 mg/dL 101 (H) 106 (H) 112 (H)

## 2018-06-26 NOTE — ASSESSMENT & PLAN NOTE
Patient is taking vitamin D 2000 units daily with calcium supplements.  Her vitamin D level is stable and well controlled with supplements.  Recent vitamin D level is 41 on 6/12/18.

## 2018-06-26 NOTE — ASSESSMENT & PLAN NOTE
Patient has slightly low GFR at 56.  She states that she has been taking ibuprofen in the past 2 weeks for left knee pain.  She already stopped taking ibuprofen.  She states that she will try to increase water intake.  She follows with orthopedist for her left knee and she is not taking any NSAIDs currently.    Results for DAPHNE LOPEZ (MRN [id number]) as of 6/26/2018 10:16   Ref. Range 9/13/2017 07:20 1/8/2018 08:18 6/12/2018 08:00   Bun Latest Ref Range: 8 - 22 mg/dL 7 (L) 10 14   Creatinine Latest Ref Range: 0.50 - 1.40 mg/dL 0.86 0.93 0.98   GFR If  Latest Ref Range: >60 mL/min/1.73 m 2 >60 >60 >60   GFR If Non  Latest Ref Range: >60 mL/min/1.73 m 2 >60 60 56 (A)

## 2018-06-26 NOTE — ASSESSMENT & PLAN NOTE
Patient states that her mood is stable and she does not feel depressed or anxious.  She is not taking any medication currently.  She wants to continue to control her mood with diet and exercise.  She denied suicidal ideation or plan or homicidal ideation plan.

## 2018-06-26 NOTE — ASSESSMENT & PLAN NOTE
Patient is taking levothyroxine 75 mcg every morning on empty stomach.  Her thyroid function tests are stable and well controlled with current regimens.  She denies side effects from taking it.  I reviewed her blood tests with her in clinic today.    Results for DAPHNE LOPEZ (MRN [id number]) as of 6/26/2018 10:16   Ref. Range 1/8/2018 08:18 6/12/2018 08:00   TSH Latest Ref Range: 0.380 - 5.330 uIU/mL 2.430 1.630   Free T-4 Latest Ref Range: 0.53 - 1.43 ng/dL 1.44 (H) 1.25

## 2018-06-28 DIAGNOSIS — G43.009 MIGRAINE WITHOUT AURA AND WITHOUT STATUS MIGRAINOSUS, NOT INTRACTABLE: ICD-10-CM

## 2018-06-28 RX ORDER — SUMATRIPTAN 50 MG/1
50 TABLET, FILM COATED ORAL
Qty: 10 TAB | Refills: 3 | Status: SHIPPED
Start: 2018-06-28 | End: 2019-12-12 | Stop reason: SDUPTHER

## 2018-07-02 DIAGNOSIS — E89.0 POSTOPERATIVE HYPOTHYROIDISM: ICD-10-CM

## 2018-07-02 DIAGNOSIS — E78.00 PURE HYPERCHOLESTEROLEMIA: ICD-10-CM

## 2018-07-02 RX ORDER — SIMVASTATIN 20 MG
20 TABLET ORAL EVERY EVENING
Qty: 90 TAB | Refills: 3 | Status: SHIPPED | OUTPATIENT
Start: 2018-07-02 | End: 2019-01-07 | Stop reason: CLARIF

## 2018-07-02 RX ORDER — LEVOTHYROXINE SODIUM 0.07 MG/1
75 TABLET ORAL
Qty: 90 TAB | Refills: 3 | Status: SHIPPED | OUTPATIENT
Start: 2018-07-02 | End: 2019-06-25 | Stop reason: SDUPTHER

## 2018-07-10 ENCOUNTER — TELEPHONE (OUTPATIENT)
Dept: MEDICAL GROUP | Age: 68
End: 2018-07-10

## 2018-07-10 ENCOUNTER — OFFICE VISIT (OUTPATIENT)
Dept: URGENT CARE | Facility: PHYSICIAN GROUP | Age: 68
End: 2018-07-10
Payer: MEDICARE

## 2018-07-10 VITALS
TEMPERATURE: 98.9 F | OXYGEN SATURATION: 97 % | SYSTOLIC BLOOD PRESSURE: 126 MMHG | DIASTOLIC BLOOD PRESSURE: 64 MMHG | HEART RATE: 63 BPM | WEIGHT: 128 LBS | BODY MASS INDEX: 21.85 KG/M2 | HEIGHT: 64 IN

## 2018-07-10 DIAGNOSIS — M26.609 TMJ (TEMPOROMANDIBULAR JOINT SYNDROME): ICD-10-CM

## 2018-07-10 DIAGNOSIS — H60.331 ACUTE SWIMMER'S EAR OF RIGHT SIDE: ICD-10-CM

## 2018-07-10 PROCEDURE — 99214 OFFICE O/P EST MOD 30 MIN: CPT | Performed by: EMERGENCY MEDICINE

## 2018-07-10 RX ORDER — NEOMYCIN SULFATE, POLYMYXIN B SULFATE AND HYDROCORTISONE 10; 3.5; 1 MG/ML; MG/ML; [USP'U]/ML
4 SUSPENSION/ DROPS AURICULAR (OTIC) 3 TIMES DAILY
Qty: 1 BOTTLE | Refills: 0 | Status: SHIPPED | OUTPATIENT
Start: 2018-07-10 | End: 2018-07-17

## 2018-07-10 ASSESSMENT — ENCOUNTER SYMPTOMS
SENSORY CHANGE: 0
COUGH: 0
RHINORRHEA: 0
FOCAL WEAKNESS: 0
HEADACHES: 0
DIZZINESS: 1
FEVER: 0
TINGLING: 0
SORE THROAT: 0
SINUS PAIN: 0

## 2018-07-10 NOTE — TELEPHONE ENCOUNTER
1. Caller Name: [facility]                                         Call Back Number: [phone]      Patient approves a detailed voicemail message: no    Camila called from [facility] needing clarification for medication. Due to patient insurance, needing to verify quanity. Patient will have 2 refills after todays left.

## 2018-07-10 NOTE — PROGRESS NOTES
Subjective:      Radha Land is a 68 y.o. female who presents with Otalgia (Rt ear pain, dizziness x 4 days )            Otalgia    There is pain in the right ear. This is a new problem. Episode onset: 4 days. The problem occurs constantly. The problem has been waxing and waning. There has been no fever. The pain is mild. Associated symptoms include hearing loss. Pertinent negatives include no coughing, ear discharge, headaches, rash, rhinorrhea or sore throat. She has tried nothing for the symptoms.   PMH TMJ; not recently using a mouth splint, recent stressors noted. Also PMH swimmer's ear.    Review of Systems   Constitutional: Negative for fever.   HENT: Positive for ear pain, hearing loss and tinnitus. Negative for congestion, ear discharge, rhinorrhea, sinus pain and sore throat.    Respiratory: Negative for cough.    Skin: Negative for itching and rash.   Neurological: Positive for dizziness. Negative for tingling, sensory change, focal weakness and headaches.     PMH:  has a past medical history of Hyperlipidemia; Migraine; and Thyroid disease.  MEDS:   Current Outpatient Prescriptions:   •  neomycin-polymyxin-HC (PEDIOTIC HC) 3.5-80489-0 Suspension, Place 4 Drops in ear 3 times a day for 7 days., Disp: 1 Bottle, Rfl: 0  •  levothyroxine (SYNTHROID) 75 MCG Tab, Take 1 Tab by mouth Every morning on an empty stomach., Disp: 90 Tab, Rfl: 3  •  simvastatin (ZOCOR) 20 MG Tab, Take 1 Tab by mouth every evening., Disp: 90 Tab, Rfl: 3  •  SUMAtriptan (IMITREX) 50 MG Tab, Take 1 Tab by mouth Once PRN., Disp: 10 Tab, Rfl: 3  •  Multiple Vitamins-Minerals (HAIR/SKIN/NAILS/BIOTIN PO), Take  by mouth., Disp: , Rfl:   •  Magnesium 250 MG Tab, Take  by mouth., Disp: , Rfl:   •  B Complex-C (SUPER B COMPLEX PO), Take 1 Tab by mouth every day., Disp: , Rfl:   •  Ascorbic Acid (VITAMIN C) 1000 MG Tab, Take 1 Tab by mouth every day., Disp: , Rfl:   •  Calcium Carbonate-Vitamin D (CALCIUM-CARB 600 + D PO), Take 1 Tab by  "mouth every day., Disp: , Rfl:   •  Probiotic Product (PROBIOTIC ADVANCED PO), Take 1 Tab by mouth every day., Disp: , Rfl:   •  Multiple Vitamins-Minerals (MULTIVITAMIN PO), Take 1 Tab by mouth every day., Disp: , Rfl:   •  Coenzyme Q10-Levocarnitine (CO Q-10 PLUS PO), Take 1 Tab by mouth every day., Disp: , Rfl:   •  docusate sodium (COLACE) 250 MG capsule, Take 250 mg by mouth every day., Disp: , Rfl:   •  hydrocortisone-pramoxine (ANALPRAM-HC) 2.5-1 % rectal cream, hydrocortisone-pramoxine 2.5 %-1 % rectal cream, Disp: , Rfl:   •  aspirin 81 MG tablet, Take 81 mg by mouth every day., Disp: , Rfl:   ALLERGIES: No Known Allergies  SURGHX:   Past Surgical History:   Procedure Laterality Date   • HEMORRHOIDECTOMY  9/12/2017    Procedure: HEMORRHOIDECTOMY- ARTERY LIGATION, RECTAL ANO REPAIR;  Surgeon: Aleksandr Quinteros M.D.;  Location: SURGERY [facility];  Service: General   • ABDOMINAL HYSTERECTOMY TOTAL      Fibroids // 1990   • OTHER      hemrroidectomy   • OTHER ORTHOPEDIC SURGERY      surgery to big toe   • THYROIDECTOMY      partial     SOCHX:  reports that she quit smoking about 33 years ago. She has a 7.50 pack-year smoking history. She has never used smokeless tobacco. She reports that she drinks about 2.4 oz of alcohol per week . She reports that she does not use drugs.  FH: family history includes Cancer in her maternal grandmother and other; Cancer (age of onset: 90) in her mother; Diabetes in her brother, father, and sister; Heart Disease in her father; Heart Failure in her father; Hyperlipidemia in her mother; Other in her father; Thyroid in her mother.       Objective:     /64   Pulse 63   Temp 37.2 °C (98.9 °F)   Ht 1.626 m (5' 4\")   Wt 58.1 kg (128 lb)   SpO2 97%   BMI 21.97 kg/m²      Physical Exam   Constitutional: She is oriented to person, place, and time. Vital signs are normal. She appears well-developed and well-nourished. She is cooperative. She does not appear ill. No " distress.   HENT:   Head: Normocephalic and atraumatic.   Right Ear: External ear normal. There is swelling and tenderness. No drainage. No mastoid tenderness. Tympanic membrane is not injected, not perforated, not erythematous and not bulging. Decreased hearing is noted.   Left Ear: Hearing, tympanic membrane, external ear and ear canal normal.   Nose: No mucosal edema or rhinorrhea.   Mouth/Throat: Uvula is midline and oropharynx is clear and moist.   Minimal temporomandibular joint tenderness with crepitus.   Eyes: Conjunctivae and lids are normal.   Neck: Phonation normal.   Pulmonary/Chest: Effort normal.   Lymphadenopathy:        Head (right side): Preauricular, posterior auricular and occipital adenopathy present.     She has no cervical adenopathy.   Neurological: She is alert and oriented to person, place, and time.   No facial palsy.   Skin: Skin is warm, dry and intact.   Psychiatric: She has a normal mood and affect.               Assessment/Plan:     1. Acute swimmer's ear of right side  - neomycin-polymyxin-HC (PEDIOTIC HC) 3.5-44065-6 Suspension; Place 4 Drops in ear 3 times a day for 7 days.  Dispense: 1 Bottle; Refill: 0    2. TMJ (temporomandibular joint syndrome)  Prevention measures, analgesics prn

## 2018-07-10 NOTE — PATIENT INSTRUCTIONS
"Otitis Externa  Otitis externa is a germ infection in the outer ear. The outer ear is the area from the eardrum to the outside of the ear. Otitis externa is sometimes called \"swimmer's ear.\"  HOME CARE  · Put drops in the ear as told by your doctor.  · Only take medicine as told by your doctor.  · If you have diabetes, your doctor may give you more directions. Follow your doctor's directions.  · Keep all doctor visits as told.  To avoid another infection:  · Keep your ear dry. Use the corner of a towel to dry your ear after swimming or bathing.  · Avoid scratching or putting things inside your ear.  · Avoid swimming in lakes, dirty water, or pools that use a chemical called chlorine poorly.  · You may use ear drops after swimming. Combine equal amounts of white vinegar and alcohol in a bottle. Put 3 or 4 drops in each ear.  GET HELP IF:   · You have a fever.  · Your ear is still red, puffy (swollen), or painful after 3 days.  · You still have yellowish-white fluid (pus) coming from the ear after 3 days.  · Your redness, puffiness, or pain gets worse.  · You have a really bad headache.  · You have redness, puffiness, pain, or tenderness behind your ear.  MAKE SURE YOU:   · Understand these instructions.  · Will watch your condition.  · Will get help right away if you are not doing well or get worse.  This information is not intended to replace advice given to you by your health care provider. Make sure you discuss any questions you have with your health care provider.  Document Released: 06/05/2009 Document Revised: 01/08/2016 Document Reviewed: 09/26/2016  Elsevier Interactive Patient Education © 2017 [employer]  Temporomandibular Joint Syndrome  Temporomandibular joint (TMJ) syndrome is a condition that affects the joints between your jaw and your skull. The TMJs are located near your ears and allow your jaw to open and close. These joints and the nearby muscles are involved in all movements of the jaw. People " with TMJ syndrome have pain in the area of these joints and muscles. Chewing, biting, or other movements of the jaw can be difficult or painful.  TMJ syndrome can be caused by various things. In many cases, the condition is mild and goes away within a few weeks. For some people, the condition can become a long-term problem.  What are the causes?  Possible causes of TMJ syndrome include:  · Grinding your teeth or clenching your jaw. Some people do this when they are under stress.  · Arthritis.  · Injury to the jaw.  · Head or neck injury.  · Teeth or dentures that are not aligned well.  In some cases, the cause of TMJ syndrome may not be known.  What are the signs or symptoms?  The most common symptom is an aching pain on the side of the head in the area of the TMJ. Other symptoms may include:  · Pain when moving your jaw, such as when chewing or biting.  · Being unable to open your jaw all the way.  · Making a clicking sound when you open your mouth.  · Headache.  · Earache.  · Neck or shoulder pain.  How is this diagnosed?  Diagnosis can usually be made based on your symptoms, your medical history, and a physical exam. Your health care provider may check the range of motion of your jaw. Imaging tests, such as X-rays or an MRI, are sometimes done. You may need to see your dentist to determine if your teeth and jaw are lined up correctly.  How is this treated?  TMJ syndrome often goes away on its own. If treatment is needed, the options may include:  · Eating soft foods and applying ice or heat.  · Medicines to relieve pain or inflammation.  · Medicines to relax the muscles.  · A splint, bite plate, or mouthpiece to prevent teeth grinding or jaw clenching.  · Relaxation techniques or counseling to help reduce stress.  · Transcutaneous electrical nerve stimulation (TENS). This helps to relieve pain by applying an electrical current through the skin.  · Acupuncture. This is sometimes helpful to relieve pain.  · Jaw  surgery. This is rarely needed.  Follow these instructions at home:  · Take medicines only as directed by your health care provider.  · Eat a soft diet if you are having trouble chewing.  · Apply ice to the painful area.  ¨ Put ice in a plastic bag.  ¨ Place a towel between your skin and the bag.  ¨ Leave the ice on for 20 minutes, 2-3 times a day.  · Apply a warm compress to the painful area as directed.  · Massage your jaw area and perform any jaw stretching exercises as recommended by your health care provider.  · If you were given a mouthpiece or bite plate, wear it as directed.  · Avoid foods that require a lot of chewing. Do not chew gum.  · Keep all follow-up visits as directed by your health care provider. This is important.  Contact a health care provider if:  · You are having trouble eating.  · You have new or worsening symptoms.  Get help right away if:  · Your jaw locks open or closed.  This information is not intended to replace advice given to you by your health care provider. Make sure you discuss any questions you have with your health care provider.  Document Released: 09/12/2002 Document Revised: 08/17/2017 Document Reviewed: 07/23/2015  [employer] Interactive Patient Education © 2017 Else[employer] Inc.

## 2018-08-16 ENCOUNTER — PATIENT MESSAGE (OUTPATIENT)
Dept: MEDICAL GROUP | Age: 68
End: 2018-08-16

## 2018-08-16 DIAGNOSIS — H66.90 EAR INFECTION: ICD-10-CM

## 2018-08-16 NOTE — TELEPHONE ENCOUNTER
1. Caller Name: Radha Land                                           Call Back Number: [phone] (home)         Patient approves a detailed voicemail message: yes    2. SPECIFIC Action To Be Taken: Referral pending, please sign.    3. Diagnosis/Clinical Reason for Request: Persistent ear infection    4. Specialty & Provider Name/Lab/Imaging Location: ENT     5. Is appointment scheduled for requested order/referral: no    Patient was informed they will receive a return phone call from the office ONLY if there are any questions before processing their request. Advised to call back if they haven't received a call from the referral department in 5 days.

## 2018-08-17 ENCOUNTER — APPOINTMENT (RX ONLY)
Dept: URBAN - METROPOLITAN AREA CLINIC 4 | Facility: CLINIC | Age: 68
Setting detail: DERMATOLOGY
End: 2018-08-17

## 2018-08-17 DIAGNOSIS — D22 MELANOCYTIC NEVI: ICD-10-CM

## 2018-08-17 DIAGNOSIS — L81.4 OTHER MELANIN HYPERPIGMENTATION: ICD-10-CM

## 2018-08-17 DIAGNOSIS — L82.1 OTHER SEBORRHEIC KERATOSIS: ICD-10-CM

## 2018-08-17 DIAGNOSIS — L72.0 EPIDERMAL CYST: ICD-10-CM

## 2018-08-17 DIAGNOSIS — D18.0 HEMANGIOMA: ICD-10-CM

## 2018-08-17 PROBLEM — D22.5 MELANOCYTIC NEVI OF TRUNK: Status: ACTIVE | Noted: 2018-08-17

## 2018-08-17 PROBLEM — D18.01 HEMANGIOMA OF SKIN AND SUBCUTANEOUS TISSUE: Status: ACTIVE | Noted: 2018-08-17

## 2018-08-17 PROBLEM — D22.62 MELANOCYTIC NEVI OF LEFT UPPER LIMB, INCLUDING SHOULDER: Status: ACTIVE | Noted: 2018-08-17

## 2018-08-17 PROBLEM — D22.71 MELANOCYTIC NEVI OF RIGHT LOWER LIMB, INCLUDING HIP: Status: ACTIVE | Noted: 2018-08-17

## 2018-08-17 PROBLEM — D22.72 MELANOCYTIC NEVI OF LEFT LOWER LIMB, INCLUDING HIP: Status: ACTIVE | Noted: 2018-08-17

## 2018-08-17 PROBLEM — D22.61 MELANOCYTIC NEVI OF RIGHT UPPER LIMB, INCLUDING SHOULDER: Status: ACTIVE | Noted: 2018-08-17

## 2018-08-17 PROCEDURE — ? COUNSELING

## 2018-08-17 PROCEDURE — 99213 OFFICE O/P EST LOW 20 MIN: CPT

## 2018-08-17 PROCEDURE — ? SUNSCREEN RECOMMENDATIONS

## 2018-08-17 ASSESSMENT — LOCATION DETAILED DESCRIPTION DERM
LOCATION DETAILED: LEFT CENTRAL MALAR CHEEK
LOCATION DETAILED: RIGHT ANTERIOR PROXIMAL THIGH
LOCATION DETAILED: RIGHT DISTAL POSTERIOR UPPER ARM
LOCATION DETAILED: RIGHT RIB CAGE
LOCATION DETAILED: LEFT INFERIOR ANTERIOR NECK
LOCATION DETAILED: RIGHT CENTRAL MALAR CHEEK
LOCATION DETAILED: LEFT ULNAR DORSAL HAND
LOCATION DETAILED: RIGHT PROXIMAL DORSAL FOREARM
LOCATION DETAILED: LEFT ANTERIOR PROXIMAL THIGH
LOCATION DETAILED: RIGHT RADIAL DORSAL HAND
LOCATION DETAILED: PERIUMBILICAL SKIN
LOCATION DETAILED: RIGHT SUPERIOR MEDIAL MIDBACK
LOCATION DETAILED: LEFT PROXIMAL POSTERIOR UPPER ARM
LOCATION DETAILED: SUPERIOR THORACIC SPINE
LOCATION DETAILED: LEFT SUPERIOR MEDIAL UPPER BACK
LOCATION DETAILED: LEFT PROXIMAL DORSAL FOREARM
LOCATION DETAILED: LEFT INFERIOR CENTRAL MALAR CHEEK

## 2018-08-17 ASSESSMENT — LOCATION SIMPLE DESCRIPTION DERM
LOCATION SIMPLE: RIGHT THIGH
LOCATION SIMPLE: UPPER BACK
LOCATION SIMPLE: RIGHT LOWER BACK
LOCATION SIMPLE: LEFT CHEEK
LOCATION SIMPLE: RIGHT POSTERIOR UPPER ARM
LOCATION SIMPLE: LEFT THIGH
LOCATION SIMPLE: LEFT UPPER BACK
LOCATION SIMPLE: LEFT HAND
LOCATION SIMPLE: RIGHT HAND
LOCATION SIMPLE: LEFT ANTERIOR NECK
LOCATION SIMPLE: ABDOMEN
LOCATION SIMPLE: RIGHT CHEEK
LOCATION SIMPLE: RIGHT FOREARM
LOCATION SIMPLE: LEFT FOREARM
LOCATION SIMPLE: LEFT POSTERIOR UPPER ARM

## 2018-08-17 ASSESSMENT — LOCATION ZONE DERM
LOCATION ZONE: HAND
LOCATION ZONE: TRUNK
LOCATION ZONE: LEG
LOCATION ZONE: FACE
LOCATION ZONE: ARM
LOCATION ZONE: NECK

## 2018-10-05 ENCOUNTER — APPOINTMENT (RX ONLY)
Dept: URBAN - METROPOLITAN AREA CLINIC 36 | Facility: CLINIC | Age: 68
Setting detail: DERMATOLOGY
End: 2018-10-05

## 2018-10-05 DIAGNOSIS — Z41.9 ENCOUNTER FOR PROCEDURE FOR PURPOSES OTHER THAN REMEDYING HEALTH STATE, UNSPECIFIED: ICD-10-CM

## 2018-10-05 PROCEDURE — ? MEDICAL CONSULTATION: BBL

## 2018-10-05 PROCEDURE — ? ADDITIONAL NOTES

## 2018-10-05 NOTE — HPI: COSMETIC CONSULTATION
Additional History: Patient was rather difficult to talk to. Sample of monoi cleanser was given that she opened and stated was too small of a sample that she doesn’t know what to do with. She ended up purchasing a whole bottle. She stated she will call Lynsey to make and apt the week following her trip in mid October. See quote for treatment plan.

## 2018-10-05 NOTE — PROCEDURE: ADDITIONAL NOTES
Additional Notes: Patient was rather difficult to talk to. Sample of monoi cleanser was given that she opened and stated was too small of a sample that she doesn’t know what to do with. She ended up purchasing a whole bottle. She stated she will call Lynsey to make and apt the week following her trip in mid October. See quote for treatment plan.
Detail Level: Simple

## 2018-11-01 ENCOUNTER — IMMUNIZATION (OUTPATIENT)
Dept: SOCIAL WORK | Facility: CLINIC | Age: 68
End: 2018-11-01
Payer: MEDICARE

## 2018-11-01 DIAGNOSIS — Z23 NEED FOR VACCINATION: ICD-10-CM

## 2018-11-01 PROCEDURE — 90662 IIV NO PRSV INCREASED AG IM: CPT | Performed by: REGISTERED NURSE

## 2018-11-01 PROCEDURE — G0008 ADMIN INFLUENZA VIRUS VAC: HCPCS | Performed by: REGISTERED NURSE

## 2018-12-27 ENCOUNTER — HOSPITAL ENCOUNTER (OUTPATIENT)
Dept: LAB | Facility: MEDICAL CENTER | Age: 68
End: 2018-12-27
Attending: INTERNAL MEDICINE
Payer: MEDICARE

## 2018-12-27 DIAGNOSIS — E78.00 PURE HYPERCHOLESTEROLEMIA: ICD-10-CM

## 2018-12-27 DIAGNOSIS — E89.0 POSTOPERATIVE HYPOTHYROIDISM: ICD-10-CM

## 2018-12-27 LAB
ALBUMIN SERPL BCP-MCNC: 4.1 G/DL (ref 3.2–4.9)
ALBUMIN/GLOB SERPL: 1.3 G/DL
ALP SERPL-CCNC: 68 U/L (ref 30–99)
ALT SERPL-CCNC: 15 U/L (ref 2–50)
ANION GAP SERPL CALC-SCNC: 7 MMOL/L (ref 0–11.9)
AST SERPL-CCNC: 19 U/L (ref 12–45)
BASOPHILS # BLD AUTO: 0.7 % (ref 0–1.8)
BASOPHILS # BLD: 0.05 K/UL (ref 0–0.12)
BILIRUB SERPL-MCNC: 0.4 MG/DL (ref 0.1–1.5)
BUN SERPL-MCNC: 12 MG/DL (ref 8–22)
CALCIUM SERPL-MCNC: 9.5 MG/DL (ref 8.5–10.5)
CHLORIDE SERPL-SCNC: 104 MMOL/L (ref 96–112)
CHOLEST SERPL-MCNC: 202 MG/DL (ref 100–199)
CO2 SERPL-SCNC: 27 MMOL/L (ref 20–33)
CREAT SERPL-MCNC: 0.81 MG/DL (ref 0.5–1.4)
EOSINOPHIL # BLD AUTO: 0.13 K/UL (ref 0–0.51)
EOSINOPHIL NFR BLD: 1.7 % (ref 0–6.9)
ERYTHROCYTE [DISTWIDTH] IN BLOOD BY AUTOMATED COUNT: 45.4 FL (ref 35.9–50)
GLOBULIN SER CALC-MCNC: 3.2 G/DL (ref 1.9–3.5)
GLUCOSE SERPL-MCNC: 87 MG/DL (ref 65–99)
HCT VFR BLD AUTO: 42.8 % (ref 37–47)
HDLC SERPL-MCNC: 81 MG/DL
HGB BLD-MCNC: 13.9 G/DL (ref 12–16)
IMM GRANULOCYTES # BLD AUTO: 0.08 K/UL (ref 0–0.11)
IMM GRANULOCYTES NFR BLD AUTO: 1.1 % (ref 0–0.9)
LDLC SERPL CALC-MCNC: 109 MG/DL
LYMPHOCYTES # BLD AUTO: 1.44 K/UL (ref 1–4.8)
LYMPHOCYTES NFR BLD: 19 % (ref 22–41)
MCH RBC QN AUTO: 30.8 PG (ref 27–33)
MCHC RBC AUTO-ENTMCNC: 32.5 G/DL (ref 33.6–35)
MCV RBC AUTO: 94.7 FL (ref 81.4–97.8)
MONOCYTES # BLD AUTO: 0.41 K/UL (ref 0–0.85)
MONOCYTES NFR BLD AUTO: 5.4 % (ref 0–13.4)
NEUTROPHILS # BLD AUTO: 5.46 K/UL (ref 2–7.15)
NEUTROPHILS NFR BLD: 72.1 % (ref 44–72)
NRBC # BLD AUTO: 0 K/UL
NRBC BLD-RTO: 0 /100 WBC
PLATELET # BLD AUTO: 343 K/UL (ref 164–446)
PMV BLD AUTO: 9.2 FL (ref 9–12.9)
POTASSIUM SERPL-SCNC: 4.2 MMOL/L (ref 3.6–5.5)
PROT SERPL-MCNC: 7.3 G/DL (ref 6–8.2)
RBC # BLD AUTO: 4.52 M/UL (ref 4.2–5.4)
SODIUM SERPL-SCNC: 138 MMOL/L (ref 135–145)
T4 FREE SERPL-MCNC: 1.29 NG/DL (ref 0.53–1.43)
TRIGL SERPL-MCNC: 62 MG/DL (ref 0–149)
TSH SERPL DL<=0.005 MIU/L-ACNC: 1.24 UIU/ML (ref 0.38–5.33)
WBC # BLD AUTO: 7.6 K/UL (ref 4.8–10.8)

## 2018-12-27 PROCEDURE — 84443 ASSAY THYROID STIM HORMONE: CPT

## 2018-12-27 PROCEDURE — 36415 COLL VENOUS BLD VENIPUNCTURE: CPT

## 2018-12-27 PROCEDURE — 85025 COMPLETE CBC W/AUTO DIFF WBC: CPT

## 2018-12-27 PROCEDURE — 80061 LIPID PANEL: CPT

## 2018-12-27 PROCEDURE — 84439 ASSAY OF FREE THYROXINE: CPT

## 2018-12-27 PROCEDURE — 80053 COMPREHEN METABOLIC PANEL: CPT

## 2019-01-02 ENCOUNTER — PATIENT MESSAGE (OUTPATIENT)
Dept: MEDICAL GROUP | Age: 69
End: 2019-01-02

## 2019-01-02 DIAGNOSIS — T75.3XXA MOTION SICKNESS, INITIAL ENCOUNTER: ICD-10-CM

## 2019-01-02 RX ORDER — SCOLOPAMINE TRANSDERMAL SYSTEM 1 MG/1
1 PATCH, EXTENDED RELEASE TRANSDERMAL
Qty: 4 PATCH | Refills: 3 | Status: SHIPPED | OUTPATIENT
Start: 2019-01-02 | End: 2019-01-02

## 2019-01-02 RX ORDER — SCOLOPAMINE TRANSDERMAL SYSTEM 1 MG/1
1 PATCH, EXTENDED RELEASE TRANSDERMAL
Qty: 4 PATCH | Refills: 3 | Status: SHIPPED
Start: 2019-01-02 | End: 2019-12-12

## 2019-01-02 NOTE — TELEPHONE ENCOUNTER
From: Radha Land  To: Clarisse Toro M.D.  Sent: 1/2/2019 7:36 AM PST  Subject: Prescription Question    I'm going on a trip to [facility] starting Jan. 9 . I plan to go on a fishing trip while there but I get motion sickness. Please send in a prescription for the patch for motion sickness. I have used this successfully in the past. If you have questions you can contact me at [phone] and I have an appointment with you on Jan. 7. Thank you.

## 2019-01-04 ENCOUNTER — TELEPHONE (OUTPATIENT)
Dept: MEDICAL GROUP | Age: 69
End: 2019-01-04

## 2019-01-04 NOTE — TELEPHONE ENCOUNTER
ESTABLISHED PATIENT PRE-VISIT PLANNING     Patient was NOT contacted to complete PVP.     Note: Patient will not be contacted if there is no indication to call.     1.  Reviewed notes from the last few office visits within the medical group: Yes    2.  If any orders were placed at last visit or intended to be done for this visit (i.e. 6 mos follow-up), do we have Results/Consult Notes?        •  Labs - Labs ordered, completed on 12/27/18 and results are in chart.   Note: If patient appointment is for lab review and patient did not complete labs, check with provider if OK to reschedule patient until labs completed.       •  Imaging - scheduled       •  Referrals - Referral ordered, patient has NOT been seen.    3. Is this appointment scheduled as a Hospital Follow-Up? No    4.  Immunizations were updated in Epic using WebIZ?: Epic matches WebIZ       •  Web Iz Recommendations: Patient is up to date on all vaccines    5.  Patient is due for the following Health Maintenance Topics:   There are no preventive care reminders to display for this patient.    - Patient is up-to-date on all Health Maintenance topics. No records have been requested at this time.    6. Orders for overdue Health Maintenance topics pended in Pre-Charting? N\A    7.  AHA (MDX) form printed for Provider? YES    8.  Patient was NOT informed to arrive 15 min prior to their scheduled appointment and bring in their medication bottles.

## 2019-01-07 ENCOUNTER — OFFICE VISIT (OUTPATIENT)
Dept: MEDICAL GROUP | Age: 69
End: 2019-01-07
Payer: MEDICARE

## 2019-01-07 VITALS
DIASTOLIC BLOOD PRESSURE: 62 MMHG | HEIGHT: 64 IN | OXYGEN SATURATION: 93 % | HEART RATE: 78 BPM | BODY MASS INDEX: 22.43 KG/M2 | TEMPERATURE: 96.7 F | SYSTOLIC BLOOD PRESSURE: 124 MMHG | WEIGHT: 131.4 LBS

## 2019-01-07 DIAGNOSIS — E89.0 POSTOPERATIVE HYPOTHYROIDISM: ICD-10-CM

## 2019-01-07 DIAGNOSIS — E78.00 PURE HYPERCHOLESTEROLEMIA: ICD-10-CM

## 2019-01-07 DIAGNOSIS — R05.9 COUGH: ICD-10-CM

## 2019-01-07 DIAGNOSIS — E55.9 VITAMIN D INSUFFICIENCY: ICD-10-CM

## 2019-01-07 DIAGNOSIS — F33.42 RECURRENT MAJOR DEPRESSIVE DISORDER, IN FULL REMISSION (HCC): ICD-10-CM

## 2019-01-07 PROCEDURE — 8041 PR SCP AHA: Performed by: INTERNAL MEDICINE

## 2019-01-07 PROCEDURE — 99214 OFFICE O/P EST MOD 30 MIN: CPT | Performed by: INTERNAL MEDICINE

## 2019-01-07 RX ORDER — ATORVASTATIN CALCIUM 20 MG/1
20 TABLET, FILM COATED ORAL EVERY EVENING
Qty: 90 TAB | Refills: 3 | Status: SHIPPED | OUTPATIENT
Start: 2019-01-07 | End: 2019-12-12 | Stop reason: SDUPTHER

## 2019-01-07 NOTE — ASSESSMENT & PLAN NOTE
Patient is taking simvastatin 20 mg every evening.  She denies side effects from taking simvastatin.  However her LDL cholesterol is not at goal yet.  I discussed with patient to switch simvastatin to atorvastatin and she agreed to try it.  I discontinue simvastatin today and I prescribed atorvastatin 20 mg to take every evening.  I reviewed her blood test result with her in clinic today.    Results for DAPHNE LOPEZ (MRN [id number]) as of 1/7/2019 13:01   Ref. Range 6/12/2018 08:00 12/27/2018 07:43   Cholesterol,Tot Latest Ref Range: 100 - 199 mg/dL 205 (H) 202 (H)   Triglycerides Latest Ref Range: 0 - 149 mg/dL 70 62   HDL Latest Ref Range: >=40 mg/dL 79 81   LDL Latest Ref Range: <100 mg/dL 112 (H) 109 (H)

## 2019-01-07 NOTE — ASSESSMENT & PLAN NOTE
This is a chronic problem.  Patient reported having dry cough every morning when she wakes up.  Her symptom is chronic and she reported having postnasal drip.  She does not have acid reflux.  She denied wheezing or shortness of breath or chest congestion.  Patient has not tried any medication for her symptoms.

## 2019-01-07 NOTE — PROGRESS NOTES
Subjective:   Radha Land is a 68 y.o. female here today for evaluation and management of:      Vitamin D insufficiency  Patient is taking vitamin D 2000 units daily.  Her vitamin D level is stable and well controlled.  Last vitamin D level was 41 on 6/12/18.    Recurrent major depressive disorder, in full remission (HCC)  Patient has history of recurrent major depression in the past and she was treated with Prozac and Effexor in the past.  She is not taking any antidepressant currently as she is in full remission.  She denied depressed mood or anxiety or suicidal ideation or plan.  She enjoys her daily activity and she plans to go to [place] for vacation in a few weeks.    Pure hypercholesterolemia  Patient is taking simvastatin 20 mg every evening.  She denies side effects from taking simvastatin.  However her LDL cholesterol is not at goal yet.  I discussed with patient to switch simvastatin to atorvastatin and she agreed to try it.  I discontinue simvastatin today and I prescribed atorvastatin 20 mg to take every evening.  I reviewed her blood test result with her in clinic today.    Results for RADHA LAND (MRN [id number]) as of 1/7/2019 13:01   Ref. Range 6/12/2018 08:00 12/27/2018 07:43   Cholesterol,Tot Latest Ref Range: 100 - 199 mg/dL 205 (H) 202 (H)   Triglycerides Latest Ref Range: 0 - 149 mg/dL 70 62   HDL Latest Ref Range: >=40 mg/dL 79 81   LDL Latest Ref Range: <100 mg/dL 112 (H) 109 (H)       Postoperative hypothyroidism  Patient is taking levothyroxine 75 mcg every morning as instructed.  Her thyroid function test was within normal.  She does not have side effects from taking it.    Results for RADHA LAND (MRN [id number]) as of 1/7/2019 13:01   Ref. Range 6/12/2018 08:00 12/27/2018 07:43   TSH Latest Ref Range: 0.380 - 5.330 uIU/mL 1.630 1.240   Free T-4 Latest Ref Range: 0.53 - 1.43 ng/dL 1.25 1.29       Cough  This is a chronic problem.  Patient reported having dry cough every  "morning when she wakes up.  Her symptom is chronic and she reported having postnasal drip.  She does not have acid reflux.  She denied wheezing or shortness of breath or chest congestion.  Patient has not tried any medication for her symptoms.         Current medicines (including changes today)  Current Outpatient Prescriptions   Medication Sig Dispense Refill   • atorvastatin (LIPITOR) 20 MG Tab Take 1 Tab by mouth every evening. 90 Tab 3   • scopolamine (TRANSDERM-SCOP) 1 mg/72hr PATCH 72 HR Apply 1 Patch to skin as directed every 72 hours. 4 Patch 3   • levothyroxine (SYNTHROID) 75 MCG Tab Take 1 Tab by mouth Every morning on an empty stomach. 90 Tab 3   • SUMAtriptan (IMITREX) 50 MG Tab Take 1 Tab by mouth Once PRN. 10 Tab 3   • hydrocortisone-pramoxine (ANALPRAM-HC) 2.5-1 % rectal cream hydrocortisone-pramoxine 2.5 %-1 % rectal cream     • Multiple Vitamins-Minerals (HAIR/SKIN/NAILS/BIOTIN PO) Take  by mouth.     • Magnesium 250 MG Tab Take  by mouth.     • B Complex-C (SUPER B COMPLEX PO) Take 1 Tab by mouth every day.     • Ascorbic Acid (VITAMIN C) 1000 MG Tab Take 1 Tab by mouth every day.     • Calcium Carbonate-Vitamin D (CALCIUM-CARB 600 + D PO) Take 1 Tab by mouth every day.     • Coenzyme Q10-Levocarnitine (CO Q-10 PLUS PO) Take 1 Tab by mouth every day.     • docusate sodium (COLACE) 250 MG capsule Take 250 mg by mouth every day.       No current facility-administered medications for this visit.      She  has a past medical history of Hyperlipidemia; Migraine; and Thyroid disease.    ROS   No chest pain, no shortness of breath, no abdominal pain       Objective:     Blood pressure 124/62, pulse 78, temperature 35.9 °C (96.7 °F), temperature source Temporal, height 1.626 m (5' 4\"), weight 59.6 kg (131 lb 6.4 oz), SpO2 93 %, not currently breastfeeding. Body mass index is 22.55 kg/m².   Physical Exam:  General: Alert, oriented and no acute distress.  Eye contact is good, speech goal directed, affect " calm  HEENT: conjunctiva non-injected, sclera non-icteric.  Oral mucous membranes pink and moist with no lesions.  Pinna normal.   Lungs: Normal respiratory effort, clear to auscultation bilaterally with good excursion.  CV: regular rate and rhythm. No murmurs.   Abdomen: soft, non distended, nontender, Bowel sound normal.  Ext: no edema, color normal, vascularity normal, temperature normal      Assessment and Plan:   The following treatment plan was discussed     1. Pure hypercholesterolemia  - Not well-controlled with simvastatin 20 mg daily.  Switch simvastatin to atorvastatin.  Patient will take atorvastatin 20 mg every evening. Recheck lab 1-2 weeks before next follow up visit.  - Reviewed the risks and benefits of treatment and potential side effects of medication.  - Advised to eat low fat, low carbohydrate and high fiber diet as well as do cardio physical exercise regularly.  - atorvastatin (LIPITOR) 20 MG Tab; Take 1 Tab by mouth every evening.  Dispense: 90 Tab; Refill: 3  - COMP METABOLIC PANEL; Future  - Lipid Profile; Future    2. Vitamin D insufficiency  - Well-controlled. Continue current regimens. Recheck lab 1-2 weeks before next follow up visit.  - VITAMIN D,25 HYDROXY; Future    3. Recurrent major depressive disorder, in full remission (HCC)  - Patient is in remission and she does not require to take any antidepressant currently.  Patient preferred to continue to manage with nonpharmacological treatment for her depression.    4. Postoperative hypothyroidism  - Well-controlled. Continue current regimens, levothyroxine 75 mcg every morning on empty stomach.. Recheck lab 1-2 weeks before next follow up visit.  - TSH; Future  - FREE THYROXINE; Future    5. Cough  - It is likely due to postnasal drip and allergic reaction.  Patient is advised to take Claritin 10 mg 1 tablets daily for 7 days.  - Discussed at length to rinse sinuses with over the counter nasal wash or Netti pot once or twice a day and  then use flonase nasal spray once or twice a day after rinsing sinuses  - Advised to gargle her throat with warm salt water twice a day.  - We will hold off chest x-ray as her symptom is not severe and her lung exam was normal.  She agreed with the plan.  - She is advised to return to clinic sooner if she has any worsening symptoms.      Followup: Return in about 6 months (around 7/7/2019), or if symptoms worsen or fail to improve, for Hyperlipidemia, Hypothyroid, Vitamin D insufficiency, Lab review.      Please note that this dictation was created using voice recognition software. I have made every reasonable attempt to correct obvious errors, but I expect that there may have unintended errors in text, spelling, punctuation, or grammar that I did not discover.

## 2019-01-07 NOTE — ASSESSMENT & PLAN NOTE
Patient is taking vitamin D 2000 units daily.  Her vitamin D level is stable and well controlled.  Last vitamin D level was 41 on 6/12/18.

## 2019-01-07 NOTE — ASSESSMENT & PLAN NOTE
Patient is taking levothyroxine 75 mcg every morning as instructed.  Her thyroid function test was within normal.  She does not have side effects from taking it.    Results for JOHN DAPHNE LEVINE (MRN [id number]) as of 1/7/2019 13:01   Ref. Range 6/12/2018 08:00 12/27/2018 07:43   TSH Latest Ref Range: 0.380 - 5.330 uIU/mL 1.630 1.240   Free T-4 Latest Ref Range: 0.53 - 1.43 ng/dL 1.25 1.29

## 2019-01-07 NOTE — ASSESSMENT & PLAN NOTE
Patient has history of recurrent major depression in the past and she was treated with Prozac and Effexor in the past.  She is not taking any antidepressant currently as she is in full remission.  She denied depressed mood or anxiety or suicidal ideation or plan.  She enjoys her daily activity and she plans to go to [place] for vacation in a few weeks.

## 2019-01-28 ENCOUNTER — HOSPITAL ENCOUNTER (OUTPATIENT)
Dept: RADIOLOGY | Facility: MEDICAL CENTER | Age: 69
End: 2019-01-28
Attending: INTERNAL MEDICINE
Payer: MEDICARE

## 2019-01-28 DIAGNOSIS — Z12.31 VISIT FOR SCREENING MAMMOGRAM: ICD-10-CM

## 2019-01-28 PROCEDURE — 77063 BREAST TOMOSYNTHESIS BI: CPT

## 2019-02-19 ENCOUNTER — OFFICE VISIT (OUTPATIENT)
Dept: URGENT CARE | Facility: PHYSICIAN GROUP | Age: 69
End: 2019-02-19
Payer: MEDICARE

## 2019-02-19 VITALS
WEIGHT: 124 LBS | BODY MASS INDEX: 21.17 KG/M2 | TEMPERATURE: 98.6 F | DIASTOLIC BLOOD PRESSURE: 72 MMHG | SYSTOLIC BLOOD PRESSURE: 122 MMHG | RESPIRATION RATE: 16 BRPM | HEART RATE: 72 BPM | HEIGHT: 64 IN | OXYGEN SATURATION: 95 %

## 2019-02-19 DIAGNOSIS — J20.9 ACUTE BRONCHITIS, UNSPECIFIED ORGANISM: ICD-10-CM

## 2019-02-19 DIAGNOSIS — R05.9 COUGH: ICD-10-CM

## 2019-02-19 PROCEDURE — 99214 OFFICE O/P EST MOD 30 MIN: CPT | Performed by: NURSE PRACTITIONER

## 2019-02-19 RX ORDER — BENZONATATE 100 MG/1
100 CAPSULE ORAL 3 TIMES DAILY PRN
Qty: 60 CAP | Refills: 0 | Status: SHIPPED | OUTPATIENT
Start: 2019-02-19 | End: 2019-06-25

## 2019-02-19 RX ORDER — AZITHROMYCIN 250 MG/1
TABLET, FILM COATED ORAL
Qty: 6 TAB | Refills: 0 | Status: SHIPPED | OUTPATIENT
Start: 2019-02-19 | End: 2019-06-25

## 2019-02-19 ASSESSMENT — ENCOUNTER SYMPTOMS
SORE THROAT: 0
DIZZINESS: 0
SPUTUM PRODUCTION: 0
CHILLS: 0
VOMITING: 0
FEVER: 0
MYALGIAS: 1
RHINORRHEA: 1
WHEEZING: 0
EYE PAIN: 0
NAUSEA: 0
COUGH: 1
SHORTNESS OF BREATH: 0

## 2019-02-20 NOTE — PROGRESS NOTES
"Subjective:   Radha Land is a 69 y.o. female who presents for Cough (X1 month )        Cough   This is a new problem. The current episode started more than 1 month ago. The problem has been gradually worsening. The problem occurs constantly. The cough is non-productive. Associated symptoms include myalgias, nasal congestion and rhinorrhea. Pertinent negatives include no chest pain, chills, fever, postnasal drip, rash, sore throat, shortness of breath or wheezing. Nothing aggravates the symptoms. She has tried OTC cough suppressant for the symptoms. The treatment provided no relief. Her past medical history is significant for bronchitis.     Review of Systems   Constitutional: Negative for chills and fever.   HENT: Positive for rhinorrhea. Negative for postnasal drip and sore throat.    Eyes: Negative for pain.   Respiratory: Positive for cough. Negative for sputum production, shortness of breath and wheezing.    Cardiovascular: Negative for chest pain.   Gastrointestinal: Negative for nausea and vomiting.   Genitourinary: Negative for hematuria.   Musculoskeletal: Positive for myalgias.   Skin: Negative for rash.   Neurological: Negative for dizziness.     No Known Allergies   Objective:   /72   Pulse 72   Temp 37 °C (98.6 °F)   Resp 16   Ht 1.626 m (5' 4\")   Wt 56.2 kg (124 lb)   SpO2 95%   BMI 21.28 kg/m²   Physical Exam   Constitutional: She is oriented to person, place, and time. She appears well-developed and well-nourished. No distress.   HENT:   Head: Normocephalic and atraumatic.   Right Ear: Tympanic membrane normal.   Left Ear: Tympanic membrane normal.   Nose: Nose normal. Right sinus exhibits no maxillary sinus tenderness and no frontal sinus tenderness. Left sinus exhibits no maxillary sinus tenderness and no frontal sinus tenderness.   Mouth/Throat: Uvula is midline, oropharynx is clear and moist and mucous membranes are normal. No posterior oropharyngeal edema, posterior " oropharyngeal erythema or tonsillar abscesses. No tonsillar exudate.   Eyes: Pupils are equal, round, and reactive to light. Conjunctivae and EOM are normal. Right eye exhibits no discharge. Left eye exhibits no discharge.   Cardiovascular: Normal rate and regular rhythm.    No murmur heard.  Pulmonary/Chest: Effort normal and breath sounds normal. No respiratory distress.   Abdominal: Soft. She exhibits no distension. There is no tenderness.   Neurological: She is alert and oriented to person, place, and time. She has normal reflexes. No sensory deficit.   Skin: Skin is warm, dry and intact.   Psychiatric: She has a normal mood and affect.         Assessment/Plan:     1. Acute bronchitis, unspecified organism  azithromycin (ZITHROMAX) 250 MG Tab   2. Cough  benzonatate (TESSALON) 100 MG Cap     Advised to continue supportive care with Tylenol and/or ibuprofen for fevers and discomfort. Increased fluids and electrolytes.  Contingent antibiotic prescription given to patient to fill upon meeting criteria of guidelines discussed.   Differential diagnosis, natural history, supportive care, and indications for immediate follow-up discussed.

## 2019-06-19 ENCOUNTER — PATIENT OUTREACH (OUTPATIENT)
Dept: HEALTH INFORMATION MANAGEMENT | Facility: OTHER | Age: 69
End: 2019-06-19

## 2019-06-19 NOTE — PROGRESS NOTES
1. Attempt #:1    2. HealthConnect Verified: yes    3. Verify PCP: yes    4. Review Care Team: yes    6. Reviewed/Updated the following with patient:       •   Communication Preference Obtained? YES       •   Preferred Pharmacy? YES       •   Preferred Lab? YES       •   Family History (document living status of immediate family members and if + hx of cancer, diabetes, hypertension, hyperlipidemia, heart attack, stroke) NO    7. Annual Wellness Visit Scheduling  · Scheduling Status:Scheduled     8. Care Gap Scheduling (Attempt to Schedule EACH Overdue Care Gap!)     Health Maintenance Due   Topic Date Due   • Annual Wellness Visit  01/31/2019        Scheduled patient for Annual Wellness Visit     9. Kompyte. Activation: already active    10. Kompyte. Felix: no    11. Virtual Visits: no    12. Opt In to Text Messages: yes    13. Patient was advised: “This is a free wellness visit. The provider will screen for medical conditions to help you stay healthy. If you have other concerns to address you may be asked to discuss these at a separate visit or there may be an additional fee.”     14. Patient was informed to arrive 15 min prior to their scheduled appointment and bring in their medication bottles.

## 2019-06-20 ENCOUNTER — TELEPHONE (OUTPATIENT)
Dept: MEDICAL GROUP | Age: 69
End: 2019-06-20

## 2019-06-20 NOTE — TELEPHONE ENCOUNTER
ANNUAL WELLNESS VISIT PRE-VISIT PLANNING WITH OUTREACH    1.  If any orders were placed at last visit, do we have Results/Consult Notes?        •  Labs - Labs ordered, but not to be completed until 7/19.  Note: If patient appointment is for lab review and patient did not complete labs, check with provider if OK to reschedule patient until labs completed.       •  Imaging - Imaging was not ordered at last office visit.       •  Referrals - Referral ordered, patient has NOT been seen.    2.  Immunizations were updated in Epic using WebIZ?:Epic matches WebIZ       •  WebIZ Recommendations: Patient is up to date on all vaccines       •  Is patient due for Tdap? NO       •  Is patient due for Shingrx? NO    3.  Patient has the following Care Path diagnoses on Problem List:  NONE    4.  Orders for overdue Health Maintenance topics pended in Pre-Charting? N\A

## 2019-06-25 ENCOUNTER — OFFICE VISIT (OUTPATIENT)
Dept: MEDICAL GROUP | Age: 69
End: 2019-06-25
Payer: MEDICARE

## 2019-06-25 VITALS
BODY MASS INDEX: 22.26 KG/M2 | OXYGEN SATURATION: 95 % | HEART RATE: 71 BPM | TEMPERATURE: 98.9 F | DIASTOLIC BLOOD PRESSURE: 62 MMHG | WEIGHT: 130.4 LBS | HEIGHT: 64 IN | SYSTOLIC BLOOD PRESSURE: 118 MMHG

## 2019-06-25 DIAGNOSIS — E78.00 PURE HYPERCHOLESTEROLEMIA: ICD-10-CM

## 2019-06-25 DIAGNOSIS — F33.42 RECURRENT MAJOR DEPRESSIVE DISORDER, IN FULL REMISSION (HCC): ICD-10-CM

## 2019-06-25 DIAGNOSIS — Z00.00 MEDICARE ANNUAL WELLNESS VISIT, SUBSEQUENT: ICD-10-CM

## 2019-06-25 DIAGNOSIS — R94.4 DECREASED GFR: ICD-10-CM

## 2019-06-25 DIAGNOSIS — E55.9 VITAMIN D INSUFFICIENCY: ICD-10-CM

## 2019-06-25 DIAGNOSIS — M17.12 PRIMARY OSTEOARTHRITIS OF LEFT KNEE: ICD-10-CM

## 2019-06-25 DIAGNOSIS — M85.80 OSTEOPENIA WITH HIGH RISK OF FRACTURE: ICD-10-CM

## 2019-06-25 DIAGNOSIS — E89.0 POSTOPERATIVE HYPOTHYROIDISM: ICD-10-CM

## 2019-06-25 DIAGNOSIS — G43.009 MIGRAINE WITHOUT AURA AND WITHOUT STATUS MIGRAINOSUS, NOT INTRACTABLE: ICD-10-CM

## 2019-06-25 PROBLEM — R05.9 COUGH: Status: RESOLVED | Noted: 2018-01-24 | Resolved: 2019-06-25

## 2019-06-25 PROCEDURE — G0439 PPPS, SUBSEQ VISIT: HCPCS | Performed by: INTERNAL MEDICINE

## 2019-06-25 RX ORDER — LEVOTHYROXINE SODIUM 0.07 MG/1
75 TABLET ORAL
Qty: 90 TAB | Refills: 3 | Status: SHIPPED | OUTPATIENT
Start: 2019-06-25 | End: 2019-12-12 | Stop reason: SDUPTHER

## 2019-06-25 ASSESSMENT — PAIN SCALES - GENERAL: PAINLEVEL: NO PAIN

## 2019-06-25 ASSESSMENT — PATIENT HEALTH QUESTIONNAIRE - PHQ9: CLINICAL INTERPRETATION OF PHQ2 SCORE: 0

## 2019-06-25 ASSESSMENT — ACTIVITIES OF DAILY LIVING (ADL): BATHING_REQUIRES_ASSISTANCE: 0

## 2019-06-25 ASSESSMENT — ENCOUNTER SYMPTOMS: GENERAL WELL-BEING: GOOD

## 2019-06-25 NOTE — PROGRESS NOTES
Chief Complaint   Patient presents with   • Annual Wellness Visit     SCP     HPI:  Radha Land is a 69 y.o. here for Medicare Annual Wellness Visit     Postoperative hypothyroidism  Patient is taking Levothyroxine 75 mcg. Her thyroid function test was within normal. She does not have side effects from taking Levothyroxine. She denies symptoms of hypo- or hyperthyroidism.     Pure hypercholesterolemia  Patient is taking Atorvastatin 20 mg every evening. She denies side effects from taking Atorvastatin. She was switched from Simvastatin to Atorvastatin in January 2019 to optimize control of cholesterol. Total cholesterol and LDL are still not at goal. I reviewed her blood test result with her in clinic today.    Results for RADHA LAND (MRN [id number]) as of 6/25/2019 14:48   Ref. Range 6/12/2018 08:00 12/27/2018 07:43   Cholesterol,Tot Latest Ref Range: 100 - 199 mg/dL 205 (H) 202 (H)   Triglycerides Latest Ref Range: 0 - 149 mg/dL 70 62   HDL Latest Ref Range: >=40 mg/dL 79 81   LDL Latest Ref Range: <100 mg/dL 112 (H) 109 (H)       Migraine without aura and without status migrainosus, not intractable  Patient is taking Sumatriptan 50 mg as needed for migraines. She denies side effects from taking it. Her migraines are well controlled currently.  She states that she might has migraine once every 3 months.  Her symptom is usually resolved by taking sumatriptan.    Osteopenia with high risk of fracture  Patient has a history of osteopenia with increased risk of fracture. She is only taking Calcium and Vitamin D supplements. She does not wish to treat with medications currently. I reviewed the patient's DEXA scan from 2015 with her. When compared to the previous study on 7/14/10 there was a 3.8% decrease in the bone mineral density of the left femur. Patient denies any history of broken bones.     Impression:  T score -0.3 on lumbar spine and T score -1.3 on left femur  10-year Probability of  Fracture:  Major Osteoporotic     13.4%  Hip     1.6%    Recurrent major depressive disorder, in full remission (HCC)  History of recurrent major depression in the past. She has been treated with Prozac and Effexor in the past. She is not taking any antidepressant currently as she is in full remission. She denies depressed mood or anxiety or suicidal ideation or plan.  She lives with her  and has good family support from .  She enjoys her daily activities.    Vitamin D insufficiency  Stable. Patient takes vitamin D supplements.     Results for DAPHNE LOPEZ (MRN [id number]) as of 6/25/2019 14:48   Ref. Range 6/12/2018 08:00   25-Hydroxy   Vitamin D 25 Latest Ref Range: 30 - 100 ng/mL 41       Primary osteoarthritis of left knee  History of osteoarthritis of left knee. Patient states that she has followed with her orthopedist, Dr. Johansen, at [facility] who recommended neoprene sleeve. Patient states that her knee pain is improved. She wears a neoprene sleeve when she walks and when she is gardening. She only experiences increasing knee pain during cold weather. Patient reports that she only takes Ibuprofen as needed.     Decreased GFR  History of low GFR. Patient states that she occasionally takes Ibuprofen for left knee pain. She denies taking a large amount of Ibuprofen and states she only takes this when needed.     Results for DAPHNE LOPEZ (MRN [id number]) as of 6/25/2019 14:48   Ref. Range 6/12/2018 08:00 12/27/2018 07:43   GFR If Non  Latest Ref Range: >60 mL/min/1.73 m 2 56 (A) >60       Patient Active Problem List    Diagnosis Date Noted   • Decreased GFR 06/26/2018   • Primary osteoarthritis of left knee 01/24/2018   • Vitamin D insufficiency 06/13/2017   • Recurrent major depressive disorder, in full remission (HCC) 11/14/2016   • Migraine without aura and without status migrainosus, not intractable 06/01/2015   • Osteopenia with high risk of fracture 06/01/2015   •  Postoperative hypothyroidism 09/02/2014   • Pure hypercholesterolemia 09/02/2014   • History of Sweet syndrome 09/02/2014       Current Outpatient Prescriptions   Medication Sig Dispense Refill   • Psyllium (METAMUCIL) 28.3 % Powder Take  by mouth.     • levothyroxine (SYNTHROID) 75 MCG Tab Take 1 Tab by mouth Every morning on an empty stomach. 90 Tab 3   • atorvastatin (LIPITOR) 20 MG Tab Take 1 Tab by mouth every evening. 90 Tab 3   • scopolamine (TRANSDERM-SCOP) 1 mg/72hr PATCH 72 HR Apply 1 Patch to skin as directed every 72 hours. 4 Patch 3   • SUMAtriptan (IMITREX) 50 MG Tab Take 1 Tab by mouth Once PRN. 10 Tab 3   • Multiple Vitamins-Minerals (HAIR/SKIN/NAILS/BIOTIN PO) Take  by mouth.     • Magnesium 250 MG Tab Take  by mouth.     • B Complex-C (SUPER B COMPLEX PO) Take 1 Tab by mouth every day.     • Ascorbic Acid (VITAMIN C) 1000 MG Tab Take 1 Tab by mouth every day.     • Calcium Carbonate-Vitamin D (CALCIUM-CARB 600 + D PO) Take 1 Tab by mouth every day.     • Coenzyme Q10-Levocarnitine (CO Q-10 PLUS PO) Take 1 Tab by mouth every day.     • docusate sodium (COLACE) 250 MG capsule Take 250 mg by mouth every day.     • hydrocortisone-pramoxine (ANALPRAM-HC) 2.5-1 % rectal cream hydrocortisone-pramoxine 2.5 %-1 % rectal cream       No current facility-administered medications for this visit.    Current supplements as per medication list.     Allergies: Patient has no known allergies.    Current social contact/activities: Faith, belongs to 3 book clubs, goes out with  and substitute teaches.    She  reports that she quit smoking about 34 years ago. She has a 7.50 pack-year smoking history. She has never used smokeless tobacco. She reports that she drinks about 2.4 oz of alcohol per week . She reports that she does not use drugs.  Counseling given: Yes      DPA/Advanced Directive:  Patient has Advanced Directive on file.     ROS:    Gait: Uses no assistive device  Ostomy: No  Other tubes:  No  Amputations: No  Chronic oxygen use: No  Last eye exam: 2018, coming up on 6/27/19  Wears hearing aids: No   : Denies any urinary leakage during the last 6 months    Depression Screening  Little interest or pleasure in doing things?  0 - not at all  Feeling down, depressed , or hopeless? 0 - not at all  Patient Health Questionnaire Score: 0     If depressive symptoms identified deferred to follow up visit unless specifically addressed in assessment and plan.    Interpretation of PHQ-9 Total Score   Score Severity   1-4 No Depression   5-9 Mild Depression   10-14 Moderate Depression   15-19 Moderately Severe Depression   20-27 Severe Depression    Screening for Cognitive Impairment  Three Minute Recall (village, kitchen, baby) 3/3    Cristi clock face with all 12 numbers and set the hands to show 10 past 10.  Yes    Cognitive concerns identified deferred for follow up unless specifically addressed in assessment and plan.    Fall Risk Assessment  Has the patient had two or more falls in the last year or any fall with injury in the last year?  No    Safety Assessment  Throw rugs on floor.  Yes  Handrails on all stairs.  Yes  Good lighting in all hallways.  Yes  Difficulty hearing.  No  Patient counseled about all safety risks that were identified.    Functional Assessment ADLs  Are there any barriers preventing you from cooking for yourself or meeting nutritional needs?  No.    Are there any barriers preventing you from driving safely or obtaining transportation?  No.    Are there any barriers preventing you from using a telephone or calling for help?  No.    Are there any barriers preventing you from shopping?  No.    Are there any barriers preventing you from taking care of your own finances?  No.    Are there any barriers preventing you from managing your medications?  No.    Are there any barriers preventing you from showering, bathing or dressing yourself?  No.    Are you currently engaging in any exercise or  physical activity?  Yes.  Walks, swims, golfes, rides a bicycle  What is your perception of your health?  Good.      Health Maintenance Summary                Annual Wellness Visit Overdue 1/31/2019      Done 1/30/2018 Visit Dx: Medicare annual wellness visit, initial    MAMMOGRAM Next Due 1/28/2020      Done 1/28/2019 MA-SCREENING MAMMO BILAT W/TOMOSYNTHESIS W/CAD     Patient has more history with this topic...    BONE DENSITY Next Due 11/11/2020      Done 11/11/2015 DS-BONE DENSITY STUDY (DEXA)     Patient has more history with this topic...    IMM DTaP/Tdap/Td Vaccine Next Due 9/2/2024      Done 9/2/2014 Imm Admin: Tdap Vaccine    COLONOSCOPY Next Due 6/23/2027      Done 6/23/2017 REFERRAL TO GI FOR COLONOSCOPY     Patient has more history with this topic...          Patient Care Team:  Clarisse Toro M.D. as PCP - General (Internal Medicine)  Johnny You M.D. as Consulting Physician (Gastroenterology)  SANDOVAL Morales as Mid Level Provider (Dermatology)  Cristal Allen        Social History   Substance Use Topics   • Smoking status: Former Smoker     Packs/day: 0.50     Years: 15.00     Quit date: 6/1/1985   • Smokeless tobacco: Never Used      Comment: smoked for 14 years 7 pack year history   • Alcohol use 2.4 oz/week     4 Glasses of wine per week      Comment: 1 glass of beer a day     Family History   Problem Relation Age of Onset   • Hyperlipidemia Mother    • Thyroid Mother         Hypothyroid   • Cancer Mother 90        uterine cancer   • Diabetes Father    • Heart Failure Father    • Heart Disease Father         Rhumatic vavular heart disease   • Other Father         rheumatic fever   • Diabetes Sister    • Cancer Other         breast   • Diabetes Brother    • Cancer Maternal Grandmother         uterine     She  has a past medical history of Hyperlipidemia; Migraine; and Thyroid disease.   Past Surgical History:   Procedure Laterality Date   • HEMORRHOIDECTOMY  9/12/2017    Procedure:  "HEMORRHOIDECTOMY- ARTERY LIGATION, RECTAL ANO REPAIR;  Surgeon: Aleksandr Quinteros M.D.;  Location: SURG[facility];  Service: General   • ABDOMINAL HYSTERECTOMY TOTAL      Fibroids // 1990   • OTHER      hemrroidectomy   • OTHER ORTHOPEDIC SURGERY      surgery to big toe   • THYROIDECTOMY      partial       Exam:   /62 (BP Location: Left arm, Patient Position: Sitting, BP Cuff Size: Adult)   Pulse 71   Temp 37.2 °C (98.9 °F)   Ht 1.626 m (5' 4\")   Wt 59.1 kg (130 lb 6.4 oz)   SpO2 95%  Body mass index is 22.38 kg/m².    Hearing excellent.    Dentition good  Alert, oriented in no acute distress.  Eye contact is good, speech goal directed, affect calm    Assessment and Plan. The following treatment and monitoring plan is recommended:      1. Medicare annual wellness visit, subsequent  - Counseled on age appropriate screening, healthy diet, and regular physical exercise.   - Subsequent Annual Wellness Visit - Includes PPPS ()    2. Postoperative hypothyroidism  - Well-controlled. Continue current regimens, Levothyroxine 75 mcg. Recheck lab 1-2 weeks before next follow up visit.  - Subsequent Annual Wellness Visit - Includes PPPS ()  - levothyroxine (SYNTHROID) 75 MCG Tab; Take 1 Tab by mouth Every morning on an empty stomach.  Dispense: 90 Tab; Refill: 3    3. Pure hypercholesterolemia  - Still not at goal, but stable. Continue current regimens, Atorvastatin 20 mg. Recheck lab 1-2 weeks before next follow up visit.  - Reviewed the risks and benefits of treatment and potential side effects of medication.  - Advised to eat low fat, low carbohydrate and high fiber diet as well as do cardio physical exercise regularly.   - Subsequent Annual Wellness Visit - Includes PPPS ()    4. Migraine without aura and without status migrainosus, not intractable  - Well-controlled. Continue current regimens, Sumatriptan 50 mg as needed. Recheck lab 1-2 weeks before next follow up visit.  - Subsequent " Annual Wellness Visit - Includes PPPS ()    5. Osteopenia with high risk of fracture  - Patient has high risk of fracture. She does not wish to start medication and will continue to manage through self efforts.   - Advised to continue calcium and vitamin D supplements.  - Advised to do weight bearing exercises and walking exercise regularly.  - Patient agrees to repeat DEXA scan next year.   - Subsequent Annual Wellness Visit - Includes PPPS ()    6. Recurrent major depressive disorder, in full remission (HCC)  - Well-controlled. Patient does not need to take medication. She will continue to monitor. Recheck lab 1-2 weeks before next follow up visit.  She is advised to seek medical attention urgently if she has recurrent depression.  She agrees with the plan.  - Subsequent Annual Wellness Visit - Includes PPPS ()    7. Vitamin D insufficiency  - Well-controlled. Continue current regimens, Vitamin D supplements, 2000 units daily. Recheck lab 1-2 weeks before next follow up visit.  - Subsequent Annual Wellness Visit - Includes PPPS ()    8. Primary osteoarthritis of left knee  - Well-controlled. Continue current regimens, low dose Ibuprofen as needed. Recheck lab 1-2 weeks before next follow up visit.  - Advised to limit NSAID intake. Discussed the medication side effects as well as risks and benefitts of Ibuprofen.  - Continue to follow with orthopedics and wear neoprene sleeve as instructed.  - Subsequent Annual Wellness Visit - Includes PPPS ()    9. Decreased GFR  - Recommended to avoid NSAID's and increase water intake.  Recheck lab in 2 weeks.  Patient has pending orders for blood test.  - Subsequent Annual Wellness Visit - Includes PPPS ()      Services suggested: No services needed at this time  Health Care Screening: Age-appropriate preventive services recommended by USPTF and ACIP covered by Medicare were discussed today. Services ordered if indicated and agreed upon by the  patient.  Referrals offered: Community-based lifestyle interventions to reduce health risks and promote self-management and wellness, fall prevention, nutrition, physical activity, tobacco-use cessation, weight loss, and mental health services as per orders if indicated.    Discussion today about general wellness and lifestyle habits:    · Prevent falls and reduce trip hazards; Cautioned about securing or removing rugs.  · Have a working fire alarm and carbon monoxide detector;   · Engage in regular physical activity and social activities     Follow-up: Return in about 2 weeks (around 7/9/2019), or if symptoms worsen or fail to improve, for Hyperlipidemia, Hypothyroid, Osteopenia, Vitamin D insufficiency, Lab review.

## 2019-06-27 ENCOUNTER — HOSPITAL ENCOUNTER (OUTPATIENT)
Dept: LAB | Facility: MEDICAL CENTER | Age: 69
End: 2019-06-27
Attending: INTERNAL MEDICINE
Payer: MEDICARE

## 2019-06-27 DIAGNOSIS — E78.00 PURE HYPERCHOLESTEROLEMIA: ICD-10-CM

## 2019-06-27 DIAGNOSIS — E55.9 VITAMIN D INSUFFICIENCY: ICD-10-CM

## 2019-06-27 DIAGNOSIS — E89.0 POSTOPERATIVE HYPOTHYROIDISM: ICD-10-CM

## 2019-06-27 LAB
25(OH)D3 SERPL-MCNC: 34 NG/ML (ref 30–100)
ALBUMIN SERPL BCP-MCNC: 4.2 G/DL (ref 3.2–4.9)
ALBUMIN/GLOB SERPL: 1.4 G/DL
ALP SERPL-CCNC: 67 U/L (ref 30–99)
ALT SERPL-CCNC: 17 U/L (ref 2–50)
ANION GAP SERPL CALC-SCNC: 7 MMOL/L (ref 0–11.9)
AST SERPL-CCNC: 23 U/L (ref 12–45)
BILIRUB SERPL-MCNC: 0.6 MG/DL (ref 0.1–1.5)
BUN SERPL-MCNC: 13 MG/DL (ref 8–22)
CALCIUM SERPL-MCNC: 9.6 MG/DL (ref 8.5–10.5)
CHLORIDE SERPL-SCNC: 105 MMOL/L (ref 96–112)
CHOLEST SERPL-MCNC: 196 MG/DL (ref 100–199)
CO2 SERPL-SCNC: 28 MMOL/L (ref 20–33)
CREAT SERPL-MCNC: 0.87 MG/DL (ref 0.5–1.4)
FASTING STATUS PATIENT QL REPORTED: NORMAL
GLOBULIN SER CALC-MCNC: 3 G/DL (ref 1.9–3.5)
GLUCOSE SERPL-MCNC: 87 MG/DL (ref 65–99)
HDLC SERPL-MCNC: 78 MG/DL
LDLC SERPL CALC-MCNC: 106 MG/DL
POTASSIUM SERPL-SCNC: 3.9 MMOL/L (ref 3.6–5.5)
PROT SERPL-MCNC: 7.2 G/DL (ref 6–8.2)
SODIUM SERPL-SCNC: 140 MMOL/L (ref 135–145)
T4 FREE SERPL-MCNC: 1.25 NG/DL (ref 0.53–1.43)
TRIGL SERPL-MCNC: 62 MG/DL (ref 0–149)
TSH SERPL DL<=0.005 MIU/L-ACNC: 1.04 UIU/ML (ref 0.38–5.33)

## 2019-06-27 PROCEDURE — 82306 VITAMIN D 25 HYDROXY: CPT

## 2019-06-27 PROCEDURE — 80053 COMPREHEN METABOLIC PANEL: CPT

## 2019-06-27 PROCEDURE — 36415 COLL VENOUS BLD VENIPUNCTURE: CPT

## 2019-06-27 PROCEDURE — 84443 ASSAY THYROID STIM HORMONE: CPT

## 2019-06-27 PROCEDURE — 84439 ASSAY OF FREE THYROXINE: CPT

## 2019-06-27 PROCEDURE — 80061 LIPID PANEL: CPT

## 2019-07-01 ENCOUNTER — TELEPHONE (OUTPATIENT)
Dept: MEDICAL GROUP | Age: 69
End: 2019-07-01

## 2019-07-01 NOTE — TELEPHONE ENCOUNTER
ESTABLISHED PATIENT PRE-VISIT PLANNING     Patient was NOT contacted to complete PVP.     Note: Patient will not be contacted if there is no indication to call.     1.  Reviewed notes from the last few office visits within the medical group: Yes    2.  If any orders were placed at last visit or intended to be done for this visit (i.e. 6 mos follow-up), do we have Results/Consult Notes?        •  Labs - Labs ordered, completed on 6/27/19 and results are in chart.   Note: If patient appointment is for lab review and patient did not complete labs, check with provider if OK to reschedule patient until labs completed.       •  Imaging - Imaging was not ordered at last office visit.       •  Referrals - No referrals were ordered at last office visit.    3. Is this appointment scheduled as a Hospital Follow-Up? No    4.  Immunizations were updated in Epic using WebIZ?: Epic matches WebIZ       •  Web Iz Recommendations: Patient is up to date on all vaccines    5.  Patient is due for the following Health Maintenance Topics:   There are no preventive care reminders to display for this patient.        6. Orders for overdue Health Maintenance topics pended in Pre-Charting? N\A    7.  AHA (MDX) form printed for Provider? No, already completed    8.  Patient was NOT informed to arrive 15 min prior to their scheduled appointment and bring in their medication bottles.

## 2019-07-08 ENCOUNTER — OFFICE VISIT (OUTPATIENT)
Dept: MEDICAL GROUP | Age: 69
End: 2019-07-08
Payer: MEDICARE

## 2019-07-08 VITALS
BODY MASS INDEX: 21.78 KG/M2 | HEIGHT: 64 IN | DIASTOLIC BLOOD PRESSURE: 50 MMHG | WEIGHT: 127.6 LBS | SYSTOLIC BLOOD PRESSURE: 100 MMHG | TEMPERATURE: 98.7 F | HEART RATE: 64 BPM | OXYGEN SATURATION: 96 %

## 2019-07-08 DIAGNOSIS — E55.9 VITAMIN D INSUFFICIENCY: ICD-10-CM

## 2019-07-08 DIAGNOSIS — E89.0 POSTOPERATIVE HYPOTHYROIDISM: ICD-10-CM

## 2019-07-08 DIAGNOSIS — F33.42 RECURRENT MAJOR DEPRESSIVE DISORDER, IN FULL REMISSION (HCC): ICD-10-CM

## 2019-07-08 DIAGNOSIS — E78.00 PURE HYPERCHOLESTEROLEMIA: ICD-10-CM

## 2019-07-08 DIAGNOSIS — G43.009 MIGRAINE WITHOUT AURA AND WITHOUT STATUS MIGRAINOSUS, NOT INTRACTABLE: ICD-10-CM

## 2019-07-08 PROCEDURE — 99214 OFFICE O/P EST MOD 30 MIN: CPT | Performed by: INTERNAL MEDICINE

## 2019-07-08 ASSESSMENT — PATIENT HEALTH QUESTIONNAIRE - PHQ9
3. TROUBLE FALLING OR STAYING ASLEEP OR SLEEPING TOO MUCH: NOT AT ALL
4. FEELING TIRED OR HAVING LITTLE ENERGY: NOT AT ALL
2. FEELING DOWN, DEPRESSED, IRRITABLE, OR HOPELESS: NOT AT ALL
1. LITTLE INTEREST OR PLEASURE IN DOING THINGS: NOT AT ALL
SUM OF ALL RESPONSES TO PHQ QUESTIONS 1-9: 0
6. FEELING BAD ABOUT YOURSELF - OR THAT YOU ARE A FAILURE OR HAVE LET YOURSELF OR YOUR FAMILY DOWN: NOT AL ALL
7. TROUBLE CONCENTRATING ON THINGS, SUCH AS READING THE NEWSPAPER OR WATCHING TELEVISION: NOT AT ALL
5. POOR APPETITE OR OVEREATING: NOT AT ALL
9. THOUGHTS THAT YOU WOULD BE BETTER OFF DEAD, OR OF HURTING YOURSELF: NOT AT ALL
SUM OF ALL RESPONSES TO PHQ9 QUESTIONS 1 AND 2: 0
8. MOVING OR SPEAKING SO SLOWLY THAT OTHER PEOPLE COULD HAVE NOTICED. OR THE OPPOSITE, BEING SO FIGETY OR RESTLESS THAT YOU HAVE BEEN MOVING AROUND A LOT MORE THAN USUAL: NOT AT ALL

## 2019-07-08 ASSESSMENT — PAIN SCALES - GENERAL: PAINLEVEL: NO PAIN

## 2019-07-08 NOTE — PROGRESS NOTES
Subjective:   Radha Land is a 69 y.o. female here today for evaluation and management of:     Pure hypercholesterolemia  Chronic, stable history of hypercholesterolemia. Currently taking Atorvastatin 20 mg once daily as directed. No medication side effects were reported including myalgias or abdominal pain. She follows a high protein, low carbohydrate diet and exercises daily for at least one hour. She is not taking a daily Aspirin. No acute complaints of dizziness, claudication or chest pain. Lipid panel was last completed on 06/27/19 and LDL is not at goal. Plan to keep current dosage of statin. Liver enzymes were normal.    I reviewed and discussed the following labs with the patient.    Results for RADHA LAND (MRN [id number]) as of 7/8/2019 11:10   Ref. Range 12/27/2018 07:43 6/27/2019 08:10   Cholesterol,Tot Latest Ref Range: 100 - 199 mg/dL 202 (H) 196   Triglycerides Latest Ref Range: 0 - 149 mg/dL 62 62   HDL Latest Ref Range: >=40 mg/dL 81 78   LDL Latest Ref Range: <100 mg/dL 109 (H) 106 (H)        Postoperative hypothyroidism  Patient has a history of hypothyroidism and is taking Levothyroxine 75 mcg every morning on an empty stomach. She denies any medication side effects. Thyroid testing was completed on 06/27/19 revealing a normal TSH. Dosage will remain unchanged today. They deny any acute complaints or concerns.     I reviewed and discussed the following labs with the patient.    Results for RADHA LAND (MRN [id number]) as of 7/8/2019 11:10   Ref. Range 12/27/2018 07:43 6/27/2019 08:10   TSH Latest Ref Range: 0.380 - 5.330 uIU/mL 1.240 1.040   Free T-4 Latest Ref Range: 0.53 - 1.43 ng/dL 1.29 1.25       Migraine without aura and without status migrainosus, not intractable  Patient has a history of intermittent migraines. She continues taking Imitrex as needed. She denies any acute complaints or concerns.    Vitamin D insufficiency  She has a history of vitamin D insufficiency  and is taking Calcium carb 600 + D and 2,000 units of vitamin D. Vitamin D level was last 34 on 06/27/19.    I reviewed and discussed the following labs with the patient.     Results for DAPHNE LOPEZ (MRN [id number]) as of 7/8/2019 11:10   Ref. Range 6/12/2018 08:00 6/27/2019 08:10   25-Hydroxy   Vitamin D 25 Latest Ref Range: 30 - 100 ng/mL 41 34     Recurrent major depressive disorder, in full remission (HCC)  She has a history of depression. She was previously treated with Prozac for six months in 2013. She was prescribed Effexor in 11/2016 for recurrent symptoms but did not start the medications. She is doing well without medications at this time.       Current medicines (including changes today)  Current Outpatient Prescriptions   Medication Sig Dispense Refill   • Psyllium (METAMUCIL) 28.3 % Powder Take  by mouth.     • levothyroxine (SYNTHROID) 75 MCG Tab Take 1 Tab by mouth Every morning on an empty stomach. 90 Tab 3   • atorvastatin (LIPITOR) 20 MG Tab Take 1 Tab by mouth every evening. 90 Tab 3   • scopolamine (TRANSDERM-SCOP) 1 mg/72hr PATCH 72 HR Apply 1 Patch to skin as directed every 72 hours. 4 Patch 3   • SUMAtriptan (IMITREX) 50 MG Tab Take 1 Tab by mouth Once PRN. 10 Tab 3   • hydrocortisone-pramoxine (ANALPRAM-HC) 2.5-1 % rectal cream hydrocortisone-pramoxine 2.5 %-1 % rectal cream     • Multiple Vitamins-Minerals (HAIR/SKIN/NAILS/BIOTIN PO) Take  by mouth.     • Magnesium 250 MG Tab Take  by mouth.     • B Complex-C (SUPER B COMPLEX PO) Take 1 Tab by mouth every day.     • Ascorbic Acid (VITAMIN C) 1000 MG Tab Take 1 Tab by mouth every day.     • Calcium Carbonate-Vitamin D (CALCIUM-CARB 600 + D PO) Take 1 Tab by mouth every day.     • Coenzyme Q10-Levocarnitine (CO Q-10 PLUS PO) Take 1 Tab by mouth every day.     • docusate sodium (COLACE) 250 MG capsule Take 250 mg by mouth every day.       No current facility-administered medications for this visit.      She  has a past medical history of  "Hyperlipidemia; Migraine; and Thyroid disease.    ROS   Negative for chest pain, shortness of breath or abdominal pain. See HPI for further details.       Objective:     /50 (BP Location: Right arm, Patient Position: Sitting, BP Cuff Size: Adult)   Pulse 64   Temp 37.1 °C (98.7 °F) (Temporal)   Ht 1.626 m (5' 4\")   Wt 57.9 kg (127 lb 9.6 oz)   SpO2 96%  Body mass index is 21.9 kg/m².     Physical Exam:  General: Alert, oriented and no acute distress.  Eye contact is good, speech goal directed, affect calm  HEENT: conjunctiva non-injected, sclera non-icteric.  Oral mucous membranes pink and moist with no lesions.  Pinna normal.   Lungs: Normal respiratory effort, clear to auscultation bilaterally with good excursion.  CV: regular rate and rhythm. No murmurs.  Abdomen: soft, non distended, nontender, Bowel sound normal.  Ext: no edema, color normal, vascularity normal, temperature normal      Assessment and Plan:   The following treatment plan was discussed:    1. Pure hypercholesterolemia  - Well-controlled. Continue current regimen of Atorvastatin 20 mg once daily in the evening. Reviewed the risks and benefits of treatment and potential side effects of medication.  - Obtained and reviewed lipid panel dated 06/27/19 which reveals LDL is not at goal. Plan to maintain current dosage.  - Recommended they follow low fat, low carbohydrate and high fiber diet. Additionally, patient was asked to exercise regularly including frequent cardio.  - Recheck lab 1-2 weeks before next follow up visit.   - Comp Metabolic Panel; Future  - Lipid Profile; Future    2. Postoperative hypothyroidism  Chronic, stable history. Well controlled with current medication of Levothyroxine 75 mcg taken on an empty stomach. Patient understands they must wait at least 30 minutes prior to eating or drinking coffee after taking the medication.  - Thyroid testing was last completed on 06/27/19 revealing a normal TSH.  - Plan to repeat labs " 1-2 weeks prior to their next follow up appointment.  - CBC WITH DIFFERENTIAL; Future  - TSH; Future  - FREE THYROXINE; Future    3. Migraine without aura and without status migrainosus, not intractable  Under good control with Imitrex as needed.  Continue current regimens.  - CBC WITH DIFFERENTIAL; Future  - Comp Metabolic Panel; Future    4. Vitamin D insufficiency  Continue current regimen of vitamin D 2,000 units daily and calcium carb + D. Vitamin D level was within normal limits at 34 on 06/27/19. Plan to reevaluate labs prior to her next follow up appointment.  - VITAMIN D,25 HYDROXY; Future    5. Recurrent major depressive disorder, in full remission (HCC)  Denies depression at this time and states her mood is good. She is not currently one medications.  Continue to monitor.  - CBC WITH DIFFERENTIAL; Future  - Comp Metabolic Panel; Future     Health Maintenance   Mammogram last completed on 01/28/19 indicating heterogeneously dense breasts with no evidence of malignancy.     Follow up: Return in about 6 months (around 1/8/2020), or if symptoms worsen or fail to improve, for Hyperlipidemia, Hypothyroid, Vitamin D insufficiency, Migraine, Lab review.      Please note that this dictation was created using voice recognition software. I have made every reasonable attempt to correct obvious errors, but I expect that there may have unintended errors in text, spelling, punctuation, or grammar that I did not discover.    I, Marilia Silva (Scribe), am scribing for, and in the presence of, Clarisse Toro M.D.    Electronically signed by: Marilia Silva (Scribe), 7/8/2019    I, Clarisse Toro M.D., personally performed the services described in this documentation, as scribed by Marilia Silva in my presence, and it is both accurate and complete.

## 2019-07-20 NOTE — DISCHARGE INSTRUCTIONS
Return if you have increasing abdominal pain, severe vomiting, no urine in the bag for 6 hours or fever.     Acute Urinary Retention, Female  Urinary retention means you are unable to pee completely or at all (empty your bladder).  HOME CARE  · Drink enough fluids to keep your pee (urine) clear or pale yellow.  · If you are sent home with a tube that drains the bladder (catheter), there will be a drainage bag attached to it. There are two types of bags. One is big that you can wear at night without having to empty it. One is smaller and needs to be emptied more often.  ¨ Keep the drainage bag emptied.  ¨ Keep the drainage bag lower than the tube.  · Only take medicine as told by your doctor.  GET HELP IF:  · You have a low-grade fever.  · You have spasms or you are leaking pee when you have spasms.  GET HELP RIGHT AWAY IF:   · You have chills or a fever.  · Your catheter stops draining pee.  · Your catheter falls out.  · You have increased bleeding that does not stop after you have rested and increased the amount of fluids you had been drinking.  MAKE SURE YOU:   · Understand these instructions.  · Will watch your condition.  · Will get help right away if you are not doing well or get worse.     This information is not intended to replace advice given to you by your health care provider. Make sure you discuss any questions you have with your health care provider.     Document Released: 06/05/2009 Document Revised: 05/03/2016 Document Reviewed: 05/29/2014  [employer] Interactive Patient Education ©2016 [employer]        Nausea and Vomiting  Nausea is a sick feeling that often comes before throwing up (vomiting). Vomiting is a reflex where stomach contents come out of your mouth. Vomiting can cause severe loss of body fluids (dehydration). Children and elderly adults can become dehydrated quickly, especially if they also have diarrhea. Nausea and vomiting are symptoms of a condition or disease. It is important to  find the cause of your symptoms.  CAUSES   · Direct irritation of the stomach lining. This irritation can result from increased acid production (gastroesophageal reflux disease), infection, food poisoning, taking certain medicines (such as nonsteroidal anti-inflammatory drugs), alcohol use, or tobacco use.  · Signals from the brain. These signals could be caused by a headache, heat exposure, an inner ear disturbance, increased pressure in the brain from injury, infection, a tumor, or a concussion, pain, emotional stimulus, or metabolic problems.  · An obstruction in the gastrointestinal tract (bowel obstruction).  · Illnesses such as diabetes, hepatitis, gallbladder problems, appendicitis, kidney problems, cancer, sepsis, atypical symptoms of a heart attack, or eating disorders.  · Medical treatments such as chemotherapy and radiation.  · Receiving medicine that makes you sleep (general anesthetic) during surgery.  DIAGNOSIS  Your caregiver may ask for tests to be done if the problems do not improve after a few days. Tests may also be done if symptoms are severe or if the reason for the nausea and vomiting is not clear. Tests may include:  · Urine tests.  · Blood tests.  · Stool tests.  · Cultures (to look for evidence of infection).  · X-rays or other imaging studies.  Test results can help your caregiver make decisions about treatment or the need for additional tests.  TREATMENT  You need to stay well hydrated. Drink frequently but in small amounts. You may wish to drink water, sports drinks, clear broth, or eat frozen ice pops or gelatin dessert to help stay hydrated. When you eat, eating slowly may help prevent nausea. There are also some antinausea medicines that may help prevent nausea.  HOME CARE INSTRUCTIONS   · Take all medicine as directed by your caregiver.  · If you do not have an appetite, do not force yourself to eat. However, you must continue to drink fluids.  · If you have an appetite, eat a normal  diet unless your caregiver tells you differently.  ¨ Eat a variety of complex carbohydrates (rice, wheat, potatoes, bread), lean meats, yogurt, fruits, and vegetables.  ¨ Avoid high-fat foods because they are more difficult to digest.  · Drink enough water and fluids to keep your urine clear or pale yellow.  · If you are dehydrated, ask your caregiver for specific rehydration instructions. Signs of dehydration may include:  ¨ Severe thirst.  ¨ Dry lips and mouth.  ¨ Dizziness.  ¨ Dark urine.  ¨ Decreasing urine frequency and amount.  ¨ Confusion.  ¨ Rapid breathing or pulse.  SEEK IMMEDIATE MEDICAL CARE IF:   · You have blood or brown flecks (like coffee grounds) in your vomit.  · You have black or bloody stools.  · You have a severe headache or stiff neck.  · You are confused.  · You have severe abdominal pain.  · You have chest pain or trouble breathing.  · You do not urinate at least once every 8 hours.  · You develop cold or clammy skin.  · You continue to vomit for longer than 24 to 48 hours.  · You have a fever.  MAKE SURE YOU:   · Understand these instructions.  · Will watch your condition.  · Will get help right away if you are not doing well or get worse.     This information is not intended to replace advice given to you by your health care provider. Make sure you discuss any questions you have with your health care provider.     Document Released: 12/18/2006 Document Revised: 03/11/2013 Document Reviewed: 05/16/2012  [employer] Interactive Patient Education ©2016 [employer]     no

## 2019-09-05 ENCOUNTER — APPOINTMENT (RX ONLY)
Dept: URBAN - METROPOLITAN AREA CLINIC 4 | Facility: CLINIC | Age: 69
Setting detail: DERMATOLOGY
End: 2019-09-05

## 2019-09-05 DIAGNOSIS — D18.0 HEMANGIOMA: ICD-10-CM

## 2019-09-05 DIAGNOSIS — D485 NEOPLASM OF UNCERTAIN BEHAVIOR OF SKIN: ICD-10-CM

## 2019-09-05 DIAGNOSIS — L82.1 OTHER SEBORRHEIC KERATOSIS: ICD-10-CM

## 2019-09-05 DIAGNOSIS — D22 MELANOCYTIC NEVI: ICD-10-CM

## 2019-09-05 DIAGNOSIS — L81.4 OTHER MELANIN HYPERPIGMENTATION: ICD-10-CM

## 2019-09-05 PROBLEM — D22.61 MELANOCYTIC NEVI OF RIGHT UPPER LIMB, INCLUDING SHOULDER: Status: ACTIVE | Noted: 2019-09-05

## 2019-09-05 PROBLEM — D22.62 MELANOCYTIC NEVI OF LEFT UPPER LIMB, INCLUDING SHOULDER: Status: ACTIVE | Noted: 2019-09-05

## 2019-09-05 PROBLEM — D18.01 HEMANGIOMA OF SKIN AND SUBCUTANEOUS TISSUE: Status: ACTIVE | Noted: 2019-09-05

## 2019-09-05 PROBLEM — D48.5 NEOPLASM OF UNCERTAIN BEHAVIOR OF SKIN: Status: ACTIVE | Noted: 2019-09-05

## 2019-09-05 PROBLEM — D22.5 MELANOCYTIC NEVI OF TRUNK: Status: ACTIVE | Noted: 2019-09-05

## 2019-09-05 PROBLEM — D22.72 MELANOCYTIC NEVI OF LEFT LOWER LIMB, INCLUDING HIP: Status: ACTIVE | Noted: 2019-09-05

## 2019-09-05 PROBLEM — D22.71 MELANOCYTIC NEVI OF RIGHT LOWER LIMB, INCLUDING HIP: Status: ACTIVE | Noted: 2019-09-05

## 2019-09-05 PROCEDURE — 11102 TANGNTL BX SKIN SINGLE LES: CPT

## 2019-09-05 PROCEDURE — ? BIOPSY BY SHAVE METHOD

## 2019-09-05 PROCEDURE — ? COUNSELING

## 2019-09-05 PROCEDURE — ? SUNSCREEN RECOMMENDATIONS

## 2019-09-05 PROCEDURE — 99213 OFFICE O/P EST LOW 20 MIN: CPT | Mod: 25

## 2019-09-05 ASSESSMENT — LOCATION SIMPLE DESCRIPTION DERM
LOCATION SIMPLE: LEFT THIGH
LOCATION SIMPLE: RIGHT THIGH
LOCATION SIMPLE: RIGHT CHEEK
LOCATION SIMPLE: LEFT CHEEK
LOCATION SIMPLE: RIGHT SHOULDER
LOCATION SIMPLE: LEFT ANTERIOR NECK
LOCATION SIMPLE: ABDOMEN
LOCATION SIMPLE: LEFT UPPER BACK
LOCATION SIMPLE: UPPER BACK
LOCATION SIMPLE: LEFT FOREARM
LOCATION SIMPLE: RIGHT FOREARM
LOCATION SIMPLE: RIGHT LOWER BACK
LOCATION SIMPLE: LEFT HAND
LOCATION SIMPLE: LEFT POSTERIOR UPPER ARM
LOCATION SIMPLE: RIGHT POSTERIOR UPPER ARM
LOCATION SIMPLE: RIGHT HAND

## 2019-09-05 ASSESSMENT — LOCATION DETAILED DESCRIPTION DERM
LOCATION DETAILED: PERIUMBILICAL SKIN
LOCATION DETAILED: RIGHT RADIAL DORSAL HAND
LOCATION DETAILED: LEFT ULNAR DORSAL HAND
LOCATION DETAILED: RIGHT ANTERIOR PROXIMAL THIGH
LOCATION DETAILED: RIGHT DISTAL POSTERIOR UPPER ARM
LOCATION DETAILED: LEFT ANTERIOR PROXIMAL THIGH
LOCATION DETAILED: RIGHT SUPERIOR MEDIAL MIDBACK
LOCATION DETAILED: RIGHT PROXIMAL DORSAL FOREARM
LOCATION DETAILED: LEFT PROXIMAL POSTERIOR UPPER ARM
LOCATION DETAILED: LEFT INFERIOR ANTERIOR NECK
LOCATION DETAILED: RIGHT RIB CAGE
LOCATION DETAILED: LEFT PROXIMAL DORSAL FOREARM
LOCATION DETAILED: RIGHT CENTRAL MALAR CHEEK
LOCATION DETAILED: SUPERIOR THORACIC SPINE
LOCATION DETAILED: LEFT SUPERIOR MEDIAL UPPER BACK
LOCATION DETAILED: RIGHT LATERAL SHOULDER
LOCATION DETAILED: LEFT CENTRAL MALAR CHEEK

## 2019-09-05 ASSESSMENT — LOCATION ZONE DERM
LOCATION ZONE: NECK
LOCATION ZONE: TRUNK
LOCATION ZONE: HAND
LOCATION ZONE: LEG
LOCATION ZONE: FACE
LOCATION ZONE: ARM

## 2019-09-05 NOTE — PROCEDURE: BIOPSY BY SHAVE METHOD
Bill For Surgical Tray: no
Size Of Lesion In Cm: 0.3
Notification Instructions: Patient will be notified of biopsy results. However, patient instructed to call the office if not contacted within 2 weeks.
Electrodesiccation Text: The wound bed was treated with electrodesiccation after the biopsy was performed.
Additional Anesthesia Volume In Cc (Will Not Render If 0): 0
Wound Care: Vaseline
Lab Facility: 
Anesthesia Type: 1% lidocaine with epinephrine
Anesthesia Volume In Cc: 2
Biopsy Type: H and E
Electrodesiccation And Curettage Text: The wound bed was treated with electrodesiccation and curettage after the biopsy was performed.
Depth Of Biopsy: dermis
Type Of Destruction Used: Curettage
X Size Of Lesion In Cm: 0.2
Consent: Written consent was obtained and risks were reviewed including but not limited to scarring, infection, bleeding, scabbing, incomplete removal, nerve damage and allergy to anesthesia.
Hemostasis: Drysol
Was A Bandage Applied: Yes
Silver Nitrate Text: The wound bed was treated with silver nitrate after the biopsy was performed.
Dressing: bandage
Cryotherapy Text: The wound bed was treated with cryotherapy after the biopsy was performed.
Post-Care Instructions: I reviewed with the patient in detail post-care instructions. Patient is to keep the biopsy site dry overnight, and then apply bacitracin twice daily until healed. Patient may apply hydrogen peroxide soaks to remove any crusting.
Lab: [id number]
Curettage Text: The wound bed was treated with curettage after the biopsy was performed.
Billing Type: Third-Party Bill
Biopsy Method: Personna blade
Detail Level: Detailed

## 2019-09-05 NOTE — HPI: FULL BODY SKIN EXAMINATION
How Severe Are Your Spot(S)?: mild
What Type Of Note Output Would You Prefer (Optional)?: Bullet Format
What Is The Reason For Today's Visit?: Full Body Skin Examination with No Concerns
What Is The Reason For Today's Visit? (Being Monitored For X): concerning skin lesions on an annual basis
Additional History: Full body exam. No concerns today.

## 2019-11-13 ENCOUNTER — IMMUNIZATION (OUTPATIENT)
Dept: SOCIAL WORK | Facility: CLINIC | Age: 69
End: 2019-11-13
Payer: MEDICARE

## 2019-11-13 DIAGNOSIS — Z23 NEED FOR VACCINATION: ICD-10-CM

## 2019-11-13 PROCEDURE — 90662 IIV NO PRSV INCREASED AG IM: CPT | Performed by: REGISTERED NURSE

## 2019-11-13 PROCEDURE — G0008 ADMIN INFLUENZA VIRUS VAC: HCPCS | Performed by: REGISTERED NURSE

## 2019-12-12 ENCOUNTER — OFFICE VISIT (OUTPATIENT)
Dept: MEDICAL GROUP | Facility: PHYSICIAN GROUP | Age: 69
End: 2019-12-12
Payer: MEDICARE

## 2019-12-12 VITALS
HEIGHT: 64 IN | SYSTOLIC BLOOD PRESSURE: 114 MMHG | HEART RATE: 79 BPM | OXYGEN SATURATION: 97 % | TEMPERATURE: 97.1 F | BODY MASS INDEX: 21.34 KG/M2 | WEIGHT: 125 LBS | DIASTOLIC BLOOD PRESSURE: 62 MMHG

## 2019-12-12 DIAGNOSIS — G43.009 MIGRAINE WITHOUT AURA AND WITHOUT STATUS MIGRAINOSUS, NOT INTRACTABLE: ICD-10-CM

## 2019-12-12 DIAGNOSIS — B35.1 TOENAIL FUNGUS: ICD-10-CM

## 2019-12-12 DIAGNOSIS — Z51.81 MEDICATION MONITORING ENCOUNTER: ICD-10-CM

## 2019-12-12 DIAGNOSIS — E78.00 PURE HYPERCHOLESTEROLEMIA: ICD-10-CM

## 2019-12-12 DIAGNOSIS — E89.0 POSTOPERATIVE HYPOTHYROIDISM: ICD-10-CM

## 2019-12-12 PROCEDURE — 99214 OFFICE O/P EST MOD 30 MIN: CPT | Performed by: FAMILY MEDICINE

## 2019-12-12 RX ORDER — LEVOTHYROXINE SODIUM 0.07 MG/1
75 TABLET ORAL
Qty: 90 TAB | Refills: 3 | Status: CANCELLED | OUTPATIENT
Start: 2019-12-12

## 2019-12-12 RX ORDER — IBUPROFEN 200 MG
200 TABLET ORAL EVERY 6 HOURS PRN
COMMUNITY
End: 2021-08-31

## 2019-12-12 RX ORDER — SENNOSIDES 8.6 MG
650 CAPSULE ORAL EVERY 6 HOURS PRN
COMMUNITY
End: 2021-08-31

## 2019-12-12 RX ORDER — ATORVASTATIN CALCIUM 20 MG/1
20 TABLET, FILM COATED ORAL EVERY EVENING
Qty: 90 TAB | Refills: 3 | Status: SHIPPED | OUTPATIENT
Start: 2019-12-12 | End: 2020-12-14

## 2019-12-12 RX ORDER — TERBINAFINE HYDROCHLORIDE 250 MG/1
250 TABLET ORAL DAILY
Qty: 30 TAB | Refills: 2 | Status: SHIPPED | OUTPATIENT
Start: 2019-12-12 | End: 2020-06-30

## 2019-12-12 RX ORDER — ATORVASTATIN CALCIUM 20 MG/1
20 TABLET, FILM COATED ORAL EVERY EVENING
Qty: 90 TAB | Refills: 3 | Status: CANCELLED | OUTPATIENT
Start: 2019-12-12

## 2019-12-12 RX ORDER — SUMATRIPTAN 50 MG/1
50 TABLET, FILM COATED ORAL
Qty: 10 TAB | Refills: 3 | Status: CANCELLED | OUTPATIENT
Start: 2019-12-12

## 2019-12-12 RX ORDER — SUMATRIPTAN 50 MG/1
50 TABLET, FILM COATED ORAL
Qty: 10 TAB | Refills: 3 | Status: SHIPPED | OUTPATIENT
Start: 2019-12-12 | End: 2021-02-02

## 2019-12-12 RX ORDER — LEVOTHYROXINE SODIUM 0.07 MG/1
75 TABLET ORAL
Qty: 90 TAB | Refills: 3 | Status: SHIPPED | OUTPATIENT
Start: 2019-12-12 | End: 2020-12-14

## 2019-12-12 SDOH — HEALTH STABILITY: MENTAL HEALTH: HOW MANY STANDARD DRINKS CONTAINING ALCOHOL DO YOU HAVE ON A TYPICAL DAY?: 1 OR 2

## 2019-12-12 SDOH — HEALTH STABILITY: MENTAL HEALTH: HOW OFTEN DO YOU HAVE A DRINK CONTAINING ALCOHOL?: 4 OR MORE TIMES A WEEK

## 2019-12-12 NOTE — PROGRESS NOTES
CC: Toenail fungus    HISTORY OF THE PRESENT ILLNESS: Patient is a 69 y.o. female. This pleasant patient is here today to establish care and discuss health problems below.    Patient is here today with concerns for toenail fungus.  This is been going on for a long time now.  Notes that both big toenails have toenail fungus.  She has been using topical Lamisil which is not helping at all.  She is wondering if anything else can be done.  She does not have any history of liver issues.    Also need of refills of her Imitrex.  She gets very rare migraines and typically only has to fill a prescription once per year.  Reports migraines ceased essentially after her hysterectomy.    Does have history of high cholesterol.  On atorvastatin.  Denies side effects or muscle aches from this medication.    Also has hypothyroidism.  Has been on levothyroxine 75 mcg for very long time now.  In need of refills and denies any symptoms of hypothyroidism at this time.    Allergies: Patient has no known allergies.    Current Outpatient Medications Ordered in Epic   Medication Sig Dispense Refill   • Cholecalciferol (D3-1000) 1000 UNIT Cap Take 4,000 Units by mouth.     • acetaminophen (TYLENOL 8 HOUR) 650 MG CR tablet Take 650 mg by mouth every 6 hours as needed.     • ibuprofen (MOTRIN) 200 MG Tab Take 200 mg by mouth every 6 hours as needed.     • SUMAtriptan (IMITREX) 50 MG Tab Take 1 Tab by mouth Once PRN. 10 Tab 3   • atorvastatin (LIPITOR) 20 MG Tab Take 1 Tab by mouth every evening. 90 Tab 3   • levothyroxine (SYNTHROID) 75 MCG Tab Take 1 Tab by mouth Every morning on an empty stomach. 90 Tab 3   • terbinafine (LAMISIL) 250 MG Tab Take 1 Tab by mouth every day. 30 Tab 2   • Multiple Vitamins-Minerals (HAIR/SKIN/NAILS/BIOTIN PO) Take  by mouth.     • Magnesium 250 MG Tab Take  by mouth.     • B Complex-C (SUPER B COMPLEX PO) Take 1 Tab by mouth every day.     • Ascorbic Acid (VITAMIN C) 1000 MG Tab Take 1 Tab by mouth every day.      • Calcium Carbonate-Vitamin D (CALCIUM-CARB 600 + D PO) Take 1 Tab by mouth every day.     • Coenzyme Q10-Levocarnitine (CO Q-10 PLUS PO) Take 1 Tab by mouth every day.     • docusate sodium (COLACE) 250 MG capsule Take 100 mg by mouth every day.     • hydrocortisone-pramoxine (ANALPRAM-HC) 2.5-1 % rectal cream hydrocortisone-pramoxine 2.5 %-1 % rectal cream       No current Epic-ordered facility-administered medications on file.        Past Medical History:   Diagnosis Date   • Hyperlipidemia    • Migraine    • Thyroid disease        Past Surgical History:   Procedure Laterality Date   • HEMORRHOIDECTOMY  2017    Procedure: HEMORRHOIDECTOMY- ARTERY LIGATION, RECTAL ANO REPAIR;  Surgeon: Aleksandr Quinteros M.D.;  Location: SURGERY [facility];  Service: General   • ABDOMINAL HYSTERECTOMY TOTAL      Fibroids 1990   • OTHER      hemrroidectomy   • OTHER ORTHOPEDIC SURGERY      surgery to big toe   • THYROIDECTOMY      partial       Social History     Tobacco Use   • Smoking status: Former Smoker     Packs/day: 0.50     Years: 15.00     Pack years: 7.50     Last attempt to quit: 1985     Years since quittin.5   • Smokeless tobacco: Never Used   • Tobacco comment: smoked for 14 years 7 pack year history   Substance Use Topics   • Alcohol use: Yes     Alcohol/week: 2.4 oz     Types: 4 Glasses of wine per week     Frequency: 4 or more times a week     Drinks per session: 1 or 2     Comment: 1 glass of beer a day   • Drug use: No       Social History     Patient does not qualify to have social determinant information on file (likely too young).   Social History Narrative   • Not on file       Family History   Problem Relation Age of Onset   • Hyperlipidemia Mother    • Thyroid Mother         Hypothyroid   • Cancer Mother 90        uterine cancer   • Diabetes Father    • Heart Failure Father    • Heart Disease Father         Rhumatic vavular heart disease   • Other Father         rheumatic fever   •  "Diabetes Sister    • Cancer Other         breast   • Diabetes Brother    • Cancer Maternal Grandmother         uterine       ROS:     - Constitutional: Negative for fever, chills, unexpected weight change, and fatigue/generalized weakness.     - Respiratory: Negative for cough, sputum production, chest congestion, dyspnea, wheezing, and crackles.      - Cardiovascular: Negative for chest pain, palpitations, orthopnea, PND, and bilateral lower extremity edema.     Exam: /62 (BP Location: Left arm, Patient Position: Sitting, BP Cuff Size: Adult)   Pulse 79   Temp 36.2 °C (97.1 °F) (Temporal)   Ht 1.626 m (5' 4\")   Wt 56.7 kg (125 lb)   SpO2 97%  Body mass index is 21.46 kg/m².    General: Well appearing, NAD  HEENT: Normocephalic. Conjunctiva clear, lids without ptosis, pupils equal and reactive to light accommodation, ears normal shape and contour, canals are clear bilaterally, tympanic membranes without bulging or erythema and normal cone of light, oropharynx is without erythema, edema or exudates.   Neck: Supple without JVD. No thyromegaly or nodules  Pulmonary: Clear to ausculation.  Normal effort. No rales, ronchi, or wheezing.  Cardiovascular: Regular rate and rhythm without murmur, rubs or gallop.   Abdomen: Soft, nontender, nondistended. Normal bowel sounds. Liver and spleen are not palpable. No rebound or guarding  Neurologic: normal gait  Lymph: No cervical, supraclavicular lymph nodes are palpable  Skin: Warm and dry.  No obvious lesions.  Musculoskeletal:  No extremity cyanosis, clubbing, or edema.  Psych: Normal mood and affect. Alert and oriented. Judgment and insight is normal.    Please note that this dictation was created using voice recognition software. I have made every reasonable attempt to correct obvious errors, but I expect that there are errors of grammar and possibly content that I did not discover before finalizing the note.      Assessment/Plan  Radha was seen today for establish " care, medication refill and nail problem.    Diagnoses and all orders for this visit:    Migraine without aura and without status migrainosus, not intractable  Chronic very well controlled, for the patient.  Imitrex refilled today.  -     SUMAtriptan (IMITREX) 50 MG Tab; Take 1 Tab by mouth Once PRN.    Postoperative hypothyroidism  Chronic well-controlled problem for the patient.  Levothyroxine refilled.  She already has thyroid labs ordered from previous PCP and will follow up on these.  -     levothyroxine (SYNTHROID) 75 MCG Tab; Take 1 Tab by mouth Every morning on an empty stomach.    Pure hypercholesterolemia  Chronic well-controlled problem for the patient.  Atorvastatin refilled today.  She already has lipid profile ordered from previous PCP and we will follow-up on these.  -     atorvastatin (LIPITOR) 20 MG Tab; Take 1 Tab by mouth every evening.    Medication monitoring encounter  Toenail fungus  Chronic problem for the patient.  Patient has had normal liver function during past checks.  Okay to start oral Lamisil.  Side effects and risks of medication were discussed.  She agrees to get her labs done before we start to make sure her liver function is normal.  We will also recheck liver function at 6-week trena and after 12-week trena at completion.  -     terbinafine (LAMISIL) 250 MG Tab; Take 1 Tab by mouth every day.  -     HEPATIC FUNCTION PANEL; Standing      Follow-up in 6 months or sooner if needed.    Kary De La Paz DO  [facility]

## 2019-12-18 ENCOUNTER — HOSPITAL ENCOUNTER (OUTPATIENT)
Dept: LAB | Facility: MEDICAL CENTER | Age: 69
End: 2019-12-18
Attending: FAMILY MEDICINE
Payer: MEDICARE

## 2019-12-18 ENCOUNTER — HOSPITAL ENCOUNTER (OUTPATIENT)
Dept: LAB | Facility: MEDICAL CENTER | Age: 69
End: 2019-12-18
Attending: INTERNAL MEDICINE
Payer: MEDICARE

## 2019-12-18 DIAGNOSIS — E89.0 POSTOPERATIVE HYPOTHYROIDISM: ICD-10-CM

## 2019-12-18 DIAGNOSIS — G43.009 MIGRAINE WITHOUT AURA AND WITHOUT STATUS MIGRAINOSUS, NOT INTRACTABLE: ICD-10-CM

## 2019-12-18 DIAGNOSIS — F33.42 RECURRENT MAJOR DEPRESSIVE DISORDER, IN FULL REMISSION (HCC): ICD-10-CM

## 2019-12-18 DIAGNOSIS — E78.00 PURE HYPERCHOLESTEROLEMIA: ICD-10-CM

## 2019-12-18 DIAGNOSIS — Z51.81 MEDICATION MONITORING ENCOUNTER: ICD-10-CM

## 2019-12-18 DIAGNOSIS — E55.9 VITAMIN D INSUFFICIENCY: ICD-10-CM

## 2019-12-18 LAB
25(OH)D3 SERPL-MCNC: 47 NG/ML (ref 30–100)
ALBUMIN SERPL BCP-MCNC: 4 G/DL (ref 3.2–4.9)
ALBUMIN SERPL BCP-MCNC: 4.1 G/DL (ref 3.2–4.9)
ALBUMIN/GLOB SERPL: 1.4 G/DL
ALP SERPL-CCNC: 67 U/L (ref 30–99)
ALP SERPL-CCNC: 69 U/L (ref 30–99)
ALT SERPL-CCNC: 16 U/L (ref 2–50)
ALT SERPL-CCNC: 17 U/L (ref 2–50)
ANION GAP SERPL CALC-SCNC: 9 MMOL/L (ref 0–11.9)
AST SERPL-CCNC: 23 U/L (ref 12–45)
AST SERPL-CCNC: 24 U/L (ref 12–45)
BASOPHILS # BLD AUTO: 0.4 % (ref 0–1.8)
BASOPHILS # BLD: 0.02 K/UL (ref 0–0.12)
BILIRUB CONJ SERPL-MCNC: <0.1 MG/DL (ref 0.1–0.5)
BILIRUB INDIRECT SERPL-MCNC: NORMAL MG/DL (ref 0–1)
BILIRUB SERPL-MCNC: 0.5 MG/DL (ref 0.1–1.5)
BILIRUB SERPL-MCNC: 0.5 MG/DL (ref 0.1–1.5)
BUN SERPL-MCNC: 13 MG/DL (ref 8–22)
CALCIUM SERPL-MCNC: 9 MG/DL (ref 8.5–10.5)
CHLORIDE SERPL-SCNC: 105 MMOL/L (ref 96–112)
CHOLEST SERPL-MCNC: 164 MG/DL (ref 100–199)
CO2 SERPL-SCNC: 25 MMOL/L (ref 20–33)
CREAT SERPL-MCNC: 0.84 MG/DL (ref 0.5–1.4)
EOSINOPHIL # BLD AUTO: 0.1 K/UL (ref 0–0.51)
EOSINOPHIL NFR BLD: 1.9 % (ref 0–6.9)
ERYTHROCYTE [DISTWIDTH] IN BLOOD BY AUTOMATED COUNT: 41.8 FL (ref 35.9–50)
GLOBULIN SER CALC-MCNC: 2.9 G/DL (ref 1.9–3.5)
GLUCOSE SERPL-MCNC: 90 MG/DL (ref 65–99)
HCT VFR BLD AUTO: 42.3 % (ref 37–47)
HDLC SERPL-MCNC: 65 MG/DL
HGB BLD-MCNC: 14.3 G/DL (ref 12–16)
IMM GRANULOCYTES # BLD AUTO: 0.01 K/UL (ref 0–0.11)
IMM GRANULOCYTES NFR BLD AUTO: 0.2 % (ref 0–0.9)
LDLC SERPL CALC-MCNC: 86 MG/DL
LYMPHOCYTES # BLD AUTO: 1.15 K/UL (ref 1–4.8)
LYMPHOCYTES NFR BLD: 21.9 % (ref 22–41)
MCH RBC QN AUTO: 31.3 PG (ref 27–33)
MCHC RBC AUTO-ENTMCNC: 33.8 G/DL (ref 33.6–35)
MCV RBC AUTO: 92.6 FL (ref 81.4–97.8)
MONOCYTES # BLD AUTO: 0.28 K/UL (ref 0–0.85)
MONOCYTES NFR BLD AUTO: 5.3 % (ref 0–13.4)
NEUTROPHILS # BLD AUTO: 3.7 K/UL (ref 2–7.15)
NEUTROPHILS NFR BLD: 70.3 % (ref 44–72)
NRBC # BLD AUTO: 0 K/UL
NRBC BLD-RTO: 0 /100 WBC
PLATELET # BLD AUTO: 264 K/UL (ref 164–446)
PMV BLD AUTO: 9.6 FL (ref 9–12.9)
POTASSIUM SERPL-SCNC: 3.9 MMOL/L (ref 3.6–5.5)
PROT SERPL-MCNC: 7 G/DL (ref 6–8.2)
PROT SERPL-MCNC: 7 G/DL (ref 6–8.2)
RBC # BLD AUTO: 4.57 M/UL (ref 4.2–5.4)
SODIUM SERPL-SCNC: 139 MMOL/L (ref 135–145)
T4 FREE SERPL-MCNC: 1.28 NG/DL (ref 0.53–1.43)
TRIGL SERPL-MCNC: 65 MG/DL (ref 0–149)
TSH SERPL DL<=0.005 MIU/L-ACNC: 3.22 UIU/ML (ref 0.38–5.33)
WBC # BLD AUTO: 5.3 K/UL (ref 4.8–10.8)

## 2019-12-18 PROCEDURE — 80053 COMPREHEN METABOLIC PANEL: CPT

## 2019-12-18 PROCEDURE — 36415 COLL VENOUS BLD VENIPUNCTURE: CPT

## 2019-12-18 PROCEDURE — 85025 COMPLETE CBC W/AUTO DIFF WBC: CPT

## 2019-12-18 PROCEDURE — 84439 ASSAY OF FREE THYROXINE: CPT

## 2019-12-18 PROCEDURE — 84443 ASSAY THYROID STIM HORMONE: CPT

## 2019-12-18 PROCEDURE — 80061 LIPID PANEL: CPT

## 2019-12-18 PROCEDURE — 82306 VITAMIN D 25 HYDROXY: CPT

## 2019-12-18 PROCEDURE — 80076 HEPATIC FUNCTION PANEL: CPT

## 2020-02-04 ENCOUNTER — HOSPITAL ENCOUNTER (OUTPATIENT)
Dept: RADIOLOGY | Facility: MEDICAL CENTER | Age: 70
End: 2020-02-04
Attending: FAMILY MEDICINE
Payer: MEDICARE

## 2020-02-04 DIAGNOSIS — Z12.39 BREAST SCREENING: ICD-10-CM

## 2020-02-04 PROCEDURE — 77067 SCR MAMMO BI INCL CAD: CPT

## 2020-02-24 ENCOUNTER — APPOINTMENT (RX ONLY)
Dept: URBAN - METROPOLITAN AREA CLINIC 4 | Facility: CLINIC | Age: 70
Setting detail: DERMATOLOGY
End: 2020-02-24

## 2020-02-24 DIAGNOSIS — D22 MELANOCYTIC NEVI: ICD-10-CM

## 2020-02-24 PROBLEM — D22.61 MELANOCYTIC NEVI OF RIGHT UPPER LIMB, INCLUDING SHOULDER: Status: ACTIVE | Noted: 2020-02-24

## 2020-02-24 PROCEDURE — 99212 OFFICE O/P EST SF 10 MIN: CPT

## 2020-02-24 PROCEDURE — ? COUNSELING

## 2020-02-24 ASSESSMENT — LOCATION SIMPLE DESCRIPTION DERM: LOCATION SIMPLE: RIGHT SHOULDER

## 2020-02-24 ASSESSMENT — LOCATION DETAILED DESCRIPTION DERM: LOCATION DETAILED: RIGHT LATERAL SHOULDER

## 2020-02-24 ASSESSMENT — LOCATION ZONE DERM: LOCATION ZONE: ARM

## 2020-02-24 NOTE — PROCEDURE: MIPS QUALITY
Quality 402: Tobacco Use And Help With Quitting Among Adolescents: Patient screened for tobacco and is an ex-smoker
Quality 110: Preventive Care And Screening: Influenza Immunization: Influenza Immunization Administered during Influenza season
Quality 431: Preventive Care And Screening: Unhealthy Alcohol Use - Screening: Patient screened for unhealthy alcohol use using a single question and scores less than 2 times per year
Quality 111:Pneumonia Vaccination Status For Older Adults: Pneumococcal Vaccination Previously Received
Detail Level: Detailed
Quality 130: Documentation Of Current Medications In The Medical Record: Current Medications Documented

## 2020-02-27 ENCOUNTER — HOSPITAL ENCOUNTER (OUTPATIENT)
Dept: LAB | Facility: MEDICAL CENTER | Age: 70
End: 2020-02-27
Attending: FAMILY MEDICINE
Payer: MEDICARE

## 2020-02-27 DIAGNOSIS — Z51.81 MEDICATION MONITORING ENCOUNTER: ICD-10-CM

## 2020-02-27 LAB
ALBUMIN SERPL BCP-MCNC: 4.2 G/DL (ref 3.2–4.9)
ALP SERPL-CCNC: 64 U/L (ref 30–99)
ALT SERPL-CCNC: 19 U/L (ref 2–50)
AST SERPL-CCNC: 26 U/L (ref 12–45)
BILIRUB CONJ SERPL-MCNC: <0.1 MG/DL (ref 0.1–0.5)
BILIRUB INDIRECT SERPL-MCNC: NORMAL MG/DL (ref 0–1)
BILIRUB SERPL-MCNC: 0.5 MG/DL (ref 0.1–1.5)
PROT SERPL-MCNC: 7.3 G/DL (ref 6–8.2)

## 2020-02-27 PROCEDURE — 80076 HEPATIC FUNCTION PANEL: CPT

## 2020-02-27 PROCEDURE — 36415 COLL VENOUS BLD VENIPUNCTURE: CPT

## 2020-03-03 ENCOUNTER — TELEPHONE (OUTPATIENT)
Dept: MEDICAL GROUP | Facility: PHYSICIAN GROUP | Age: 70
End: 2020-03-03

## 2020-03-03 NOTE — TELEPHONE ENCOUNTER
ESTABLISHED PATIENT PRE-VISIT PLANNING     Patient was NOT contacted to complete PVP.    1.  Reviewed notes from the last few office visits within the medical group: Yes    2.  If any orders were placed at last visit or intended to be done for this visit (i.e. 6 mos follow-up), do we have Results/Consult Notes?        •  Labs - Labs ordered, completed on 12/18/2019, 2/27/2020 and results are in chart.       •  Imaging - Imaging ordered, completed and results are in chart.       •  Referrals - No referrals were ordered at last office visit.    3. Is this appointment scheduled as a Hospital Follow-Up? No    4.  Immunizations were updated in [employer] using WebIZ?: Yes       •  Web Iz Recommendations: MMR  and TD    5.  Patient is due for the following Health Maintenance Topics:   There are no preventive care reminders to display for this patient.    6. Orders for overdue Health Maintenance topics pended in Pre-Charting? NO    7.  AHA (MDX) form printed for Provider? YES    8.  Patient was NOT informed to arrive 15 min prior to their scheduled appointment and bring in their medication bottles.

## 2020-03-05 ENCOUNTER — OFFICE VISIT (OUTPATIENT)
Dept: MEDICAL GROUP | Facility: PHYSICIAN GROUP | Age: 70
End: 2020-03-05
Payer: MEDICARE

## 2020-03-05 VITALS
TEMPERATURE: 97.8 F | SYSTOLIC BLOOD PRESSURE: 118 MMHG | DIASTOLIC BLOOD PRESSURE: 56 MMHG | BODY MASS INDEX: 21.34 KG/M2 | HEIGHT: 64 IN | OXYGEN SATURATION: 94 % | HEART RATE: 75 BPM | WEIGHT: 125 LBS

## 2020-03-05 DIAGNOSIS — E78.00 PURE HYPERCHOLESTEROLEMIA: ICD-10-CM

## 2020-03-05 DIAGNOSIS — R05.3 CHRONIC COUGH: ICD-10-CM

## 2020-03-05 DIAGNOSIS — Z51.81 MEDICATION MONITORING ENCOUNTER: ICD-10-CM

## 2020-03-05 DIAGNOSIS — E89.0 POSTOPERATIVE HYPOTHYROIDISM: ICD-10-CM

## 2020-03-05 DIAGNOSIS — D22.9 NEVUS: ICD-10-CM

## 2020-03-05 PROCEDURE — 99214 OFFICE O/P EST MOD 30 MIN: CPT | Performed by: FAMILY MEDICINE

## 2020-03-05 ASSESSMENT — FIBROSIS 4 INDEX: FIB4 SCORE: 1.6

## 2020-03-05 NOTE — PROGRESS NOTES
"CC:  Nevus, medication monitoring    HISTORY OF THE PRESENT ILLNESS: Patient is a 70 y.o. female. This pleasant patient is here today for the following concerns:    Nevus  Patient reports she's recently noticed a mole on her right anterior shin. She is unsure if there have been any recent changes, however is concerned due to irregular borders and multicolored nature. She does regularly follow with Dermatology every September. She denies a personal history of skin cancer. Denies other concerning lesions.    Medication monitoring encounter  Patient is currently using Lamisil for toenail fungus and is inquiring about her liver function. Today we reviewed blood work from 2/27/20 which indicated normal liver function with AST 26, ALT 19, alk phos 64.    Pure hypercholesterolemia  Chronic. Patient reports compliancy with atorvastatin 20 mg QN and denies any associated side effects. Today we reviewed blood work from 12/18/19 which indicated improved lipid panel with tot 164; tri 65; HDL 65; LDL 86. The 10-year ASCVD risk score (Allenton DC Jr., et al., 2013) is: 8.4%. She denies any chest pain or claudication.    Postoperative hypothyroidism  Chronic. Patient reports compliancy with levothyroxine 75 mcg QD and denies any associated side effects. Today we reviewed blood work from 12/18/19 which indicated normal . She denies any current symptoms of hypo- or hyperthyroidism.    Chronic cough  Patient reports for the past several years she's experienced a cough in the morning with minimal phlegm production. There is no associated fever, chills, nausea, vomiting, sore throat, hemoptysis. At home she's been using a Netipot with mild-moderate relief. She had a CXR for these concerns 1/25/18, which was benign. She states \"I just wanted to check in and make sure this was not something to be concerned about\".      Allergies: Patient has no known allergies.    Current Outpatient Medications Ordered in Epic   Medication Sig Dispense " Refill   • Cholecalciferol (D3-1000) 1000 UNIT Cap Take 4,000 Units by mouth.     • acetaminophen (TYLENOL 8 HOUR) 650 MG CR tablet Take 650 mg by mouth every 6 hours as needed.     • ibuprofen (MOTRIN) 200 MG Tab Take 200 mg by mouth every 6 hours as needed.     • SUMAtriptan (IMITREX) 50 MG Tab Take 1 Tab by mouth Once PRN. 10 Tab 3   • atorvastatin (LIPITOR) 20 MG Tab Take 1 Tab by mouth every evening. 90 Tab 3   • levothyroxine (SYNTHROID) 75 MCG Tab Take 1 Tab by mouth Every morning on an empty stomach. 90 Tab 3   • terbinafine (LAMISIL) 250 MG Tab Take 1 Tab by mouth every day. 30 Tab 2   • Multiple Vitamins-Minerals (HAIR/SKIN/NAILS/BIOTIN PO) Take  by mouth.     • Magnesium 250 MG Tab Take  by mouth.     • B Complex-C (SUPER B COMPLEX PO) Take 1 Tab by mouth every day.     • Ascorbic Acid (VITAMIN C) 1000 MG Tab Take 1 Tab by mouth every day.     • Calcium Carbonate-Vitamin D (CALCIUM-CARB 600 + D PO) Take 1 Tab by mouth every day.     • Coenzyme Q10-Levocarnitine (CO Q-10 PLUS PO) Take 1 Tab by mouth every day.     • docusate sodium (COLACE) 250 MG capsule Take 100 mg by mouth every day.       No current [facility]-ordered facility-administered medications on file.        Past Medical History:   Diagnosis Date   • Hyperlipidemia    • Migraine    • Thyroid disease        Past Surgical History:   Procedure Laterality Date   • HEMORRHOIDECTOMY  2017    Procedure: HEMORRHOIDECTOMY- ARTERY LIGATION, RECTAL ANO REPAIR;  Surgeon: Aleksandr Quinteros M.D.;  Location: SURGERY [facility];  Service: General   • ABDOMINAL HYSTERECTOMY TOTAL      Fibroids 1990   • OTHER      hemrroidectomy   • OTHER ORTHOPEDIC SURGERY      surgery to big toe   • THYROIDECTOMY      partial       Social History     Tobacco Use   • Smoking status: Former Smoker     Packs/day: 0.50     Years: 15.00     Pack years: 7.50     Last attempt to quit: 1985     Years since quittin.7   • Smokeless tobacco: Never Used   • Tobacco  "comment: smoked for 14 years 7 pack year history   Substance Use Topics   • Alcohol use: Yes     Alcohol/week: 2.4 oz     Types: 4 Glasses of wine per week     Frequency: 4 or more times a week     Drinks per session: 1 or 2     Comment: 1 glass of beer a day   • Drug use: No       Social History     Social History Narrative   • Not on file       Family History   Problem Relation Age of Onset   • Hyperlipidemia Mother    • Thyroid Mother         Hypothyroid   • Cancer Mother 90        uterine cancer   • Diabetes Father    • Heart Failure Father    • Heart Disease Father         Rhumatic vavular heart disease   • Other Father         rheumatic fever   • Diabetes Sister    • Cancer Other         breast   • Diabetes Brother    • Cancer Maternal Grandmother         uterine       ROS:     - Constitutional:  Negative for fever, chills, unexpected weight change, and fatigue/generalized weakness.     - Respiratory: Asserts chronic cough with sputum, chronic. Negative for chest congestion, dyspnea, wheezing, and crackles.      - Cardiovascular:  Negative for chest pain, palpitations, orthopnea, PND, and bilateral lower extremity edema.     - Musculoskeletal:  Negative for myalgias, back pain, and joint pain.     - Skin: Asserts mole right lower extremity. Negative for rash, itching, cyanotic skin color change.     - Neurological:  Negative for dizziness, tingling, tremors, focal sensory deficit, focal weakness and headaches.     - Psychiatric/Behavioral:  Negative for depression, suicidal/homicidal ideation and memory loss.        - NOTE: All other systems reviewed and are negative, except as in HPI.      Labs: Labs reviewed from 12/18/19 & 02/27/20 and questions from patient were answered.    Exam: /56 (BP Location: Left arm, Patient Position: Sitting, BP Cuff Size: Adult)   Pulse 75   Temp 36.6 °C (97.8 °F) (Temporal)   Ht 1.626 m (5' 4\")   Wt 56.7 kg (125 lb)   SpO2 94%  Body mass index is 21.46 " kg/m².    General: Well appearing, NAD  HEENT: Normocephalic. Conjunctiva clear, lids without ptosis, pupils equal and reactive to light accommodation, ears normal shape and contour, canals are clear bilaterally, tympanic membranes without bulging or erythema and normal cone of light, oropharynx is without erythema, edema or exudates.   Neck: Supple without JVD. No thyromegaly or nodules  Pulmonary: Clear to ausculation.  Normal effort. No rales, ronchi, or wheezing.  Cardiovascular: Regular rate and rhythm without murmur, rubs or gallop.   Abdomen: Soft, nontender, nondistended. Normal bowel sounds. Liver and spleen are not palpable. No rebound or guarding  Neurologic: normal gait  Lymph: No cervical, supraclavicular lymph nodes are palpable  Skin: Warm and dry.  No obvious lesions. Right anterior shin there is a flat, multicolored lesion with irregular borders  Musculoskeletal:  No extremity cyanosis, clubbing, or edema.  Psych: Normal mood and affect. Alert and oriented. Judgment and insight is normal.    Please note that this dictation was created using voice recognition software. I have made every reasonable attempt to correct obvious errors, but I expect that there are errors of grammar and possibly content that I did not discover before finalizing the note.      Assessment/Plan  Radha was seen today for annual exam and cough.    Diagnoses and all orders for this visit:    Nevus  - Patient with new nevus on right anterior shin, see exam above. Due to irregular borders and multicolored nature of lesion, I have advised her to follow up with her dermatologist prior to currently scheduled appointment 9/2020.  She agrees to let me know if she is unable to get in sooner, and we will do shave biopsy here in clinic.    Medication monitoring encounter  - Continuing to monitor liver function during lamisil use for toenail fungus. Labs 2/27/20 indicated normal liver function with AST 26, ALT 19, alk phos 64. Plan to  continue to monitor, blood work ordered as follows:  -     HEPATIC FUNCTION PANEL; Future    Pure hypercholesterolemia  - Well-controlled. Continue current regimen, atorvastatin 20 mg QN. I reviewed potential risks, benefits, and side effects of this medication with the patient in clinic today.    Postoperative hypothyroidism  - Well-controlled. Continue current regimen, levothyroxine 75 mcg QD. I reviewed potential risks, benefits, and side effects of this medication with the patient in clinic today.  - Plan to continue to monitor with blood work.    Chronic cough  - Patient reports cough in the morning for several years. She had benign CXR for this complaint 1/25/18.  Reassurance provided as symptoms have not changed over several years including since her last x-ray.  - She is advised to continue use of Netipot in addition to using OTC flonase.    Health Maintenance: Reviewed and up to date.    Kary De La Paz DO  [facility]     Ana Laura LOZADA (Eliane), am scribing for, and in the presence of, Kary De La Paz DO.    Electronically signed by: Ana Laura Tucker), 3/5/2020     Kary LOZADA DO personally performed the services described in this documentation, as scribed by Ana Laura Glasgow in my presence, and it is both accurate and complete.

## 2020-05-13 ENCOUNTER — PATIENT OUTREACH (OUTPATIENT)
Dept: HEALTH INFORMATION MANAGEMENT | Facility: OTHER | Age: 70
End: 2020-05-13

## 2020-05-13 NOTE — PROGRESS NOTES
Called member to schedule AHA/AWV appointment and introduce SCPPA program. No answer LM with call back number [phone]

## 2020-06-29 ENCOUNTER — TELEPHONE (OUTPATIENT)
Dept: MEDICAL GROUP | Facility: PHYSICIAN GROUP | Age: 70
End: 2020-06-29

## 2020-06-29 NOTE — TELEPHONE ENCOUNTER
ESTABLISHED PATIENT PRE-VISIT PLANNING     Patient was contacted to complete PVP.    1.  Reviewed notes from the last few office visits within the medical group: Yes    2.  If any orders were placed at last visit or intended to be done for this visit (i.e. 6 mos follow-up), do we have Results/Consult Notes?        •  Labs - Labs ordered, NOT completed. Patient advised to complete prior to next appointment.       •  Imaging - Imaging was not ordered at last office visit.       •  Referrals - No referrals were ordered at last office visit.    3. Is this appointment scheduled as a Hospital Follow-Up? No    4.  Immunizations were updated in [employer] using WebIZ?: Yes       •  Web Iz Recommendations: MMR  and TD    5.  Patient is due for the following Health Maintenance Topics:   Health Maintenance Due   Topic Date Due   • Annual Wellness Visit  06/25/2020       6. Orders for overdue Health Maintenance topics pended in Pre-Charting? NO    7.  AHA (MDX) form printed for Provider? No, already completed    8.  Patient was informed to arrive 15 min prior to their scheduled appointment and bring in their medication bottles.

## 2020-06-30 ENCOUNTER — OFFICE VISIT (OUTPATIENT)
Dept: MEDICAL GROUP | Facility: PHYSICIAN GROUP | Age: 70
End: 2020-06-30
Payer: MEDICARE

## 2020-06-30 ENCOUNTER — HOSPITAL ENCOUNTER (OUTPATIENT)
Dept: LAB | Facility: MEDICAL CENTER | Age: 70
End: 2020-06-30
Attending: FAMILY MEDICINE
Payer: MEDICARE

## 2020-06-30 VITALS
TEMPERATURE: 97.7 F | SYSTOLIC BLOOD PRESSURE: 118 MMHG | OXYGEN SATURATION: 97 % | WEIGHT: 119 LBS | HEIGHT: 64 IN | HEART RATE: 63 BPM | BODY MASS INDEX: 20.32 KG/M2 | DIASTOLIC BLOOD PRESSURE: 64 MMHG

## 2020-06-30 DIAGNOSIS — Z78.0 POSTMENOPAUSAL: ICD-10-CM

## 2020-06-30 DIAGNOSIS — Z51.81 MEDICATION MONITORING ENCOUNTER: ICD-10-CM

## 2020-06-30 DIAGNOSIS — Z00.00 ENCOUNTER FOR PREVENTIVE CARE: ICD-10-CM

## 2020-06-30 DIAGNOSIS — Z00.00 ANNUAL PHYSICAL EXAM: Primary | ICD-10-CM

## 2020-06-30 DIAGNOSIS — M85.80 OSTEOPENIA WITH HIGH RISK OF FRACTURE: ICD-10-CM

## 2020-06-30 DIAGNOSIS — E55.9 VITAMIN D DEFICIENCY: ICD-10-CM

## 2020-06-30 DIAGNOSIS — E78.00 HIGH CHOLESTEROL: ICD-10-CM

## 2020-06-30 DIAGNOSIS — E03.9 HYPOTHYROIDISM, UNSPECIFIED TYPE: ICD-10-CM

## 2020-06-30 DIAGNOSIS — H61.22 IMPACTED CERUMEN OF LEFT EAR: ICD-10-CM

## 2020-06-30 LAB
25(OH)D3 SERPL-MCNC: 65 NG/ML (ref 30–100)
ALBUMIN SERPL BCP-MCNC: 4 G/DL (ref 3.2–4.9)
ALBUMIN/GLOB SERPL: 1.5 G/DL
ALP SERPL-CCNC: 67 U/L (ref 30–99)
ALT SERPL-CCNC: 19 U/L (ref 2–50)
ANION GAP SERPL CALC-SCNC: 10 MMOL/L (ref 7–16)
AST SERPL-CCNC: 22 U/L (ref 12–45)
BASOPHILS # BLD AUTO: 0.5 % (ref 0–1.8)
BASOPHILS # BLD: 0.02 K/UL (ref 0–0.12)
BILIRUB CONJ SERPL-MCNC: <0.2 MG/DL (ref 0.1–0.5)
BILIRUB INDIRECT SERPL-MCNC: NORMAL MG/DL (ref 0–1)
BILIRUB SERPL-MCNC: 0.3 MG/DL (ref 0.1–1.5)
BUN SERPL-MCNC: 12 MG/DL (ref 8–22)
CALCIUM SERPL-MCNC: 9.1 MG/DL (ref 8.5–10.5)
CHLORIDE SERPL-SCNC: 104 MMOL/L (ref 96–112)
CHOLEST SERPL-MCNC: 165 MG/DL (ref 100–199)
CO2 SERPL-SCNC: 26 MMOL/L (ref 20–33)
CREAT SERPL-MCNC: 0.77 MG/DL (ref 0.5–1.4)
EOSINOPHIL # BLD AUTO: 0.1 K/UL (ref 0–0.51)
EOSINOPHIL NFR BLD: 2.3 % (ref 0–6.9)
ERYTHROCYTE [DISTWIDTH] IN BLOOD BY AUTOMATED COUNT: 43.1 FL (ref 35.9–50)
FASTING STATUS PATIENT QL REPORTED: NORMAL
GLOBULIN SER CALC-MCNC: 2.6 G/DL (ref 1.9–3.5)
GLUCOSE SERPL-MCNC: 91 MG/DL (ref 65–99)
HCT VFR BLD AUTO: 41.3 % (ref 37–47)
HDLC SERPL-MCNC: 77 MG/DL
HGB BLD-MCNC: 13.7 G/DL (ref 12–16)
IMM GRANULOCYTES # BLD AUTO: 0.01 K/UL (ref 0–0.11)
IMM GRANULOCYTES NFR BLD AUTO: 0.2 % (ref 0–0.9)
LDLC SERPL CALC-MCNC: 78 MG/DL
LYMPHOCYTES # BLD AUTO: 1.27 K/UL (ref 1–4.8)
LYMPHOCYTES NFR BLD: 29 % (ref 22–41)
MCH RBC QN AUTO: 31.6 PG (ref 27–33)
MCHC RBC AUTO-ENTMCNC: 33.2 G/DL (ref 33.6–35)
MCV RBC AUTO: 95.4 FL (ref 81.4–97.8)
MONOCYTES # BLD AUTO: 0.31 K/UL (ref 0–0.85)
MONOCYTES NFR BLD AUTO: 7.1 % (ref 0–13.4)
NEUTROPHILS # BLD AUTO: 2.67 K/UL (ref 2–7.15)
NEUTROPHILS NFR BLD: 60.9 % (ref 44–72)
NRBC # BLD AUTO: 0 K/UL
NRBC BLD-RTO: 0 /100 WBC
PLATELET # BLD AUTO: 255 K/UL (ref 164–446)
PMV BLD AUTO: 9.6 FL (ref 9–12.9)
POTASSIUM SERPL-SCNC: 4.4 MMOL/L (ref 3.6–5.5)
PROT SERPL-MCNC: 6.6 G/DL (ref 6–8.2)
RBC # BLD AUTO: 4.33 M/UL (ref 4.2–5.4)
SODIUM SERPL-SCNC: 140 MMOL/L (ref 135–145)
TRIGL SERPL-MCNC: 49 MG/DL (ref 0–149)
TSH SERPL DL<=0.005 MIU/L-ACNC: 0.5 UIU/ML (ref 0.38–5.33)
WBC # BLD AUTO: 4.4 K/UL (ref 4.8–10.8)

## 2020-06-30 PROCEDURE — 82248 BILIRUBIN DIRECT: CPT

## 2020-06-30 PROCEDURE — 82306 VITAMIN D 25 HYDROXY: CPT

## 2020-06-30 PROCEDURE — 80053 COMPREHEN METABOLIC PANEL: CPT

## 2020-06-30 PROCEDURE — 36415 COLL VENOUS BLD VENIPUNCTURE: CPT

## 2020-06-30 PROCEDURE — 80061 LIPID PANEL: CPT

## 2020-06-30 PROCEDURE — 84443 ASSAY THYROID STIM HORMONE: CPT

## 2020-06-30 PROCEDURE — 85025 COMPLETE CBC W/AUTO DIFF WBC: CPT

## 2020-06-30 PROCEDURE — 99397 PER PM REEVAL EST PAT 65+ YR: CPT | Performed by: FAMILY MEDICINE

## 2020-06-30 ASSESSMENT — FIBROSIS 4 INDEX: FIB4 SCORE: 1.6

## 2020-06-30 NOTE — PATIENT INSTRUCTIONS
How to Perform the Epley Maneuver  The Epley maneuver is an exercise that relieves symptoms of vertigo. Vertigo is the feeling that you or your surroundings are moving when they are not. When you feel vertigo, you may feel like the room is spinning and have trouble walking. Dizziness is a little different than vertigo. When you are dizzy, you may feel unsteady or light-headed.  You can do this maneuver at home whenever you have symptoms of vertigo. You can do it up to 3 times a day until your symptoms go away.  Even though the Epley maneuver may relieve your vertigo for a few weeks, it is possible that your symptoms will return. This maneuver relieves vertigo, but it does not relieve dizziness.  What are the risks?  If it is done correctly, the Epley maneuver is considered safe. Sometimes it can lead to dizziness or nausea that goes away after a short time. If you develop other symptoms, such as changes in vision, weakness, or numbness, stop doing the maneuver and call your health care provider.  How to perform the Epley maneuver  1. Sit on the edge of a bed or table with your back straight and your legs extended or hanging over the edge of the bed or table.  2. Turn your head group home toward the affected ear or side.  3. Lie backward quickly with your head turned until you are lying flat on your back. You may want to position a pillow under your shoulders.  4. Hold this position for 30 seconds. You may experience an attack of vertigo. This is normal.  5. Turn your head to the opposite direction until your unaffected ear is facing the floor.  6. Hold this position for 30 seconds. You may experience an attack of vertigo. This is normal. Hold this position until the vertigo stops.  7. Turn your whole body to the same side as your head. Hold for another 30 seconds.  8. Sit back up.  You can repeat this exercise up to 3 times a day.  Follow these instructions at home:  · After doing the Epley maneuver, you can return to  your normal activities.  · Ask your health care provider if there is anything you should do at home to prevent vertigo. He or she may recommend that you:  ? Keep your head raised (elevated) with two or more pillows while you sleep.  ? Do not sleep on the side of your affected ear.  ? Get up slowly from bed.  ? Avoid sudden movements during the day.  ? Avoid extreme head movement, like looking up or bending over.  Contact a health care provider if:  · Your vertigo gets worse.  · You have other symptoms, including:  ? Nausea.  ? Vomiting.  ? Headache.  Get help right away if:  · You have vision changes.  · You have a severe or worsening headache or neck pain.  · You cannot stop vomiting.  · You have new numbness or weakness in any part of your body.  Summary  · Vertigo is the feeling that you or your surroundings are moving when they are not.  · The Epley maneuver is an exercise that relieves symptoms of vertigo.  · If the Epley maneuver is done correctly, it is considered safe. You can do it up to 3 times a day.  This information is not intended to replace advice given to you by your health care provider. Make sure you discuss any questions you have with your health care provider.  Document Released: 12/23/2014 Document Revised: 11/30/2018 Document Reviewed: 11/07/2017  Elsevier Patient Education © 2020 [employer] Inc[employer]

## 2020-06-30 NOTE — PROGRESS NOTES
CC: Annual physical    HISTORY OF THE PRESENT ILLNESS: Patient is a 70 y.o. female. This pleasant patient is here today for annual physical.    Patient is here today for annual physical.  She has no health concerns at this time.  She remained stable on all of her medications.  She wants to get up-to-date on preventative care.    Feels she may be due for a DEXA scan soon.  She is quite healthy and active as well as committed to a healthy diet.    Comprehensive lab work was within normal limits in December 18, 2019.    Allergies: Patient has no known allergies.    Current Outpatient Medications Ordered in Epic   Medication Sig Dispense Refill   • carbamide peroxide (DEBROX) 6.5 % Solution Place 6 Drops in left ear 2 times a day. 1 Bottle 0   • Cholecalciferol (D3-1000) 1000 UNIT Cap Take 4,000 Units by mouth.     • acetaminophen (TYLENOL 8 HOUR) 650 MG CR tablet Take 650 mg by mouth every 6 hours as needed.     • ibuprofen (MOTRIN) 200 MG Tab Take 200 mg by mouth every 6 hours as needed.     • SUMAtriptan (IMITREX) 50 MG Tab Take 1 Tab by mouth Once PRN. 10 Tab 3   • atorvastatin (LIPITOR) 20 MG Tab Take 1 Tab by mouth every evening. 90 Tab 3   • levothyroxine (SYNTHROID) 75 MCG Tab Take 1 Tab by mouth Every morning on an empty stomach. 90 Tab 3   • Multiple Vitamins-Minerals (HAIR/SKIN/NAILS/BIOTIN PO) Take  by mouth.     • Magnesium 250 MG Tab Take  by mouth.     • B Complex-C (SUPER B COMPLEX PO) Take 1 Tab by mouth every day.     • Ascorbic Acid (VITAMIN C) 1000 MG Tab Take 1 Tab by mouth every day.     • Calcium Carbonate-Vitamin D (CALCIUM-CARB 600 + D PO) Take 1 Tab by mouth every day.     • Coenzyme Q10-Levocarnitine (CO Q-10 PLUS PO) Take 1 Tab by mouth every day.     • docusate sodium (COLACE) 250 MG capsule Take 100 mg by mouth every day.       No current [facility]-ordered facility-administered medications on file.        Past Medical History:   Diagnosis Date   • Hyperlipidemia    • Migraine    • Thyroid  disease        Past Surgical History:   Procedure Laterality Date   • HEMORRHOIDECTOMY  2017    Procedure: HEMORRHOIDECTOMY- ARTERY LIGATION, RECTAL ANO REPAIR;  Surgeon: Aleksandr Quinteros M.D.;  Location: SURGERY [facility];  Service: General   • ABDOMINAL HYSTERECTOMY TOTAL      Fibroids 1990   • OTHER      hemrroidectomy   • OTHER ORTHOPEDIC SURGERY      surgery to big toe   • THYROIDECTOMY      partial       Social History     Tobacco Use   • Smoking status: Former Smoker     Packs/day: 0.50     Years: 15.00     Pack years: 7.50     Last attempt to quit: 1985     Years since quittin.1   • Smokeless tobacco: Never Used   • Tobacco comment: smoked for 14 years 7 pack year history   Substance Use Topics   • Alcohol use: Yes     Alcohol/week: 2.4 oz     Types: 4 Glasses of wine per week     Frequency: 4 or more times a week     Drinks per session: 1 or 2     Comment: 1 glass of beer a day   • Drug use: No       Social History     Social History Narrative   • Not on file       Family History   Problem Relation Age of Onset   • Hyperlipidemia Mother    • Thyroid Mother         Hypothyroid   • Cancer Mother 90        uterine cancer   • Diabetes Father    • Heart Failure Father    • Heart Disease Father         Rhumatic vavular heart disease   • Other Father         rheumatic fever   • Diabetes Sister    • Cancer Other         breast   • Diabetes Brother    • Cancer Maternal Grandmother         uterine       ROS:     - Constitutional: Negative for fever, chills, unexpected weight change, and fatigue/generalized weakness.     - HEENT: Negative for headaches, vision changes, hearing changes, ear pain, ear discharge, rhinorrhea, sinus congestion, sore throat, and neck pain.      - Respiratory: Negative for cough, sputum production, chest congestion, dyspnea, wheezing, and crackles.      - Cardiovascular: Negative for chest pain, palpitations, orthopnea, PND, and bilateral lower extremity edema.     -  "Gastrointestinal: Negative for heartburn, nausea, vomiting, abdominal pain, hematochezia, melena, diarrhea, constipation, and greasy/foul-smelling stools.     - Genitourinary: Negative for dysuria, polyuria, hematuria, pyuria, urinary urgency, and urinary incontinence.     - Musculoskeletal: Negative for myalgias, back pain, and joint pain.     - Skin: Negative for rash, itching, cyanotic skin color change.     - Neurological: Negative for dizziness, tingling, tremors, focal sensory deficit, focal weakness and headaches.       - NOTE: All other systems reviewed and are negative, except as in HPI.      Labs: Labs reviewed from December 18, 2019 and June 30, 2020.    Exam: /64 (BP Location: Left arm, Patient Position: Sitting, BP Cuff Size: Adult)   Pulse 63   Temp 36.5 °C (97.7 °F) (Temporal)   Ht 1.626 m (5' 4\")   Wt 54 kg (119 lb)   SpO2 97%  Body mass index is 20.43 kg/m².    General: Well appearing, NAD  HEENT: Normocephalic. Conjunctiva clear, lids without ptosis, pupils equal and reactive to light accommodation, ears normal shape and contour, canal clear on right side, left canal obscured by cerumen.  Tympanic membranes without bulging or erythema and normal cone of light, oropharynx is without erythema, edema or exudates.   Neck: Supple without JVD. No thyromegaly or nodules  Pulmonary: Clear to ausculation.  Normal effort. No rales, ronchi, or wheezing.  Cardiovascular: Regular rate and rhythm without murmur, rubs or gallop.   Abdomen: Soft, nontender, nondistended. Normal bowel sounds. Liver and spleen are not palpable. No rebound or guarding  Neurologic: normal gait  Lymph: No cervical, supraclavicular lymph nodes are palpable  Skin: Warm and dry.  No obvious lesions.  Musculoskeletal:  No extremity cyanosis, clubbing, or edema.  Psych: Normal mood and affect. Alert and oriented. Judgment and insight is normal.    Please note that this dictation was created using voice recognition software. I " have made every reasonable attempt to correct obvious errors, but I expect that there are errors of grammar and possibly content that I did not discover before finalizing the note.      Assessment/Plan  Radha was seen today for annual exam and patient question.    Diagnoses and all orders for this visit:    Annual physical exam  Patient here primarily for annual physical exam today.  No concerns on review of systems or exam.  Patient remains quite physically active and committed to a healthy lifestyle.    Impacted cerumen of left ear  Noted on exam today.  Offered irrigation, but patient prefers to trial Debrox drops first.  -     carbamide peroxide (DEBROX) 6.5 % Solution; Place 6 Drops in left ear 2 times a day.    Postmenopausal  Osteopenia with high risk of fracture  Will be due for DEXA scan in November.  -     DS-BONE DENSITY STUDY (DEXA); Future    Follow-up in 1 year sooner if needed.    Kary De La Paz,   [facility]

## 2020-08-04 ENCOUNTER — OFFICE VISIT (OUTPATIENT)
Dept: MEDICAL GROUP | Facility: PHYSICIAN GROUP | Age: 70
End: 2020-08-04
Payer: MEDICARE

## 2020-08-04 VITALS
HEIGHT: 64 IN | HEART RATE: 74 BPM | TEMPERATURE: 98.3 F | BODY MASS INDEX: 20.32 KG/M2 | OXYGEN SATURATION: 98 % | DIASTOLIC BLOOD PRESSURE: 56 MMHG | WEIGHT: 119 LBS | SYSTOLIC BLOOD PRESSURE: 110 MMHG

## 2020-08-04 DIAGNOSIS — H93.8X1 EAR FULLNESS, RIGHT: ICD-10-CM

## 2020-08-04 DIAGNOSIS — H61.22 IMPACTED CERUMEN OF LEFT EAR: ICD-10-CM

## 2020-08-04 PROCEDURE — 99214 OFFICE O/P EST MOD 30 MIN: CPT | Performed by: FAMILY MEDICINE

## 2020-08-04 ASSESSMENT — FIBROSIS 4 INDEX: FIB4 SCORE: 1.41

## 2020-08-04 NOTE — PROGRESS NOTES
CC: Right ear fullness    HISTORY OF THE PRESENT ILLNESS: Patient is a 70 y.o. female. This pleasant patient is here today for ear issues.    During patient's last visit, she was noted to have left cerumen impaction.  At that time, she did not desire irrigation, but instead opted to trial Debrox drops at home.  She notes that she got some of the wax out with Debrox drops.  States the left ear actually feels fine, but the right ear actually feels a sensation of fullness.  This is been ongoing for a couple of weeks now.  She is to get this sensation a lot when swimming, but is not currently doing that.  She does have chronic tinnitus, but nothing has changed from that standpoint.  She is not experiencing any vertigo or hearing loss.  No respiratory symptoms.    Allergies: Patient has no known allergies.    Current Outpatient Medications Ordered in Epic   Medication Sig Dispense Refill   • Cholecalciferol (D3-1000) 1000 UNIT Cap Take 4,000 Units by mouth.     • acetaminophen (TYLENOL 8 HOUR) 650 MG CR tablet Take 650 mg by mouth every 6 hours as needed.     • ibuprofen (MOTRIN) 200 MG Tab Take 200 mg by mouth every 6 hours as needed.     • SUMAtriptan (IMITREX) 50 MG Tab Take 1 Tab by mouth Once PRN. 10 Tab 3   • atorvastatin (LIPITOR) 20 MG Tab Take 1 Tab by mouth every evening. 90 Tab 3   • levothyroxine (SYNTHROID) 75 MCG Tab Take 1 Tab by mouth Every morning on an empty stomach. 90 Tab 3   • Magnesium 250 MG Tab Take  by mouth.     • B Complex-C (SUPER B COMPLEX PO) Take 1 Tab by mouth every day.     • Ascorbic Acid (VITAMIN C) 1000 MG Tab Take 1 Tab by mouth every day.     • Calcium Carbonate-Vitamin D (CALCIUM-CARB 600 + D PO) Take 1 Tab by mouth every day.     • Coenzyme Q10-Levocarnitine (CO Q-10 PLUS PO) Take 1 Tab by mouth every day.     • docusate sodium (COLACE) 250 MG capsule Take 100 mg by mouth every day.       No current [facility]-ordered facility-administered medications on file.        Past Medical  History:   Diagnosis Date   • Hyperlipidemia    • Migraine    • Thyroid disease        Past Surgical History:   Procedure Laterality Date   • HEMORRHOIDECTOMY  2017    Procedure: HEMORRHOIDECTOMY- ARTERY LIGATION, RECTAL ANO REPAIR;  Surgeon: Aleksandr Quinteros M.D.;  Location: SURGERY [facility];  Service: General   • ABDOMINAL HYSTERECTOMY TOTAL      Fibroids /    • OTHER      hemrroidectomy   • OTHER ORTHOPEDIC SURGERY      surgery to big toe   • THYROIDECTOMY      partial       Social History     Tobacco Use   • Smoking status: Former Smoker     Packs/day: 0.50     Years: 15.00     Pack years: 7.50     Last attempt to quit: 1985     Years since quittin.2   • Smokeless tobacco: Never Used   • Tobacco comment: smoked for 14 years 7 pack year history   Substance Use Topics   • Alcohol use: Yes     Alcohol/week: 2.4 oz     Types: 4 Glasses of wine per week     Frequency: 4 or more times a week     Drinks per session: 1 or 2     Comment: 1 glass of beer a day   • Drug use: No       Social History     Social History Narrative   • Not on file       Family History   Problem Relation Age of Onset   • Hyperlipidemia Mother    • Thyroid Mother         Hypothyroid   • Cancer Mother 90        uterine cancer   • Diabetes Father    • Heart Failure Father    • Heart Disease Father         Rhumatic vavular heart disease   • Other Father         rheumatic fever   • Diabetes Sister    • Cancer Other         breast   • Diabetes Brother    • Cancer Maternal Grandmother         uterine       ROS:     - Constitutional: Negative for fever, chills, unexpected weight change, and fatigue/generalized weakness.     - Respiratory: Negative for cough, sputum production, chest congestion, dyspnea, wheezing, and crackles.      - Cardiovascular: Negative for chest pain, palpitations, orthopnea, PND, and bilateral lower extremity edema.     Exam: /56 (BP Location: Right arm, Patient Position: Sitting, BP Cuff Size:  "Adult)   Pulse 74   Temp 36.8 °C (98.3 °F) (Temporal)   Ht 1.626 m (5' 4\")   Wt 54 kg (119 lb)   SpO2 98%  Body mass index is 20.43 kg/m².    General: Well appearing, NAD  HEENT: Normocephalic. Conjunctiva clear, lids without ptosis, pupils equal and reactive to light accommodation, ears normal shape and contour, canals are clear bilaterally (left ear canal initially impacted with cerumen, cleared after irrigation), tympanic membranes without bulging or erythema and normal cone of light  Skin: Warm and dry.  No obvious lesions.  Musculoskeletal:  No extremity cyanosis, clubbing, or edema.  Psych: Normal mood and affect. Alert and oriented. Judgment and insight is normal.    Please note that this dictation was created using voice recognition software. I have made every reasonable attempt to correct obvious errors, but I expect that there are errors of grammar and possibly content that I did not discover before finalizing the note.      Assessment/Plan  Radha was seen today for ear fullness.    Diagnoses and all orders for this visit:    Impacted cerumen of left ear  Ear fullness, right  On exam, patient's left ear has continued impaction.  Right ear exam which she is experiencing the fullness in, is actually normal.  We have gone ahead and irrigated the left ear today and remove the cerumen impaction with the hopes that overall hearing and conduction will improve.    Follow-up if symptoms not improving.    Kary De La Paz, DO  [facility]      "

## 2020-09-02 ENCOUNTER — OFFICE VISIT (OUTPATIENT)
Dept: URGENT CARE | Facility: PHYSICIAN GROUP | Age: 70
End: 2020-09-02
Payer: MEDICARE

## 2020-09-02 VITALS
HEART RATE: 64 BPM | HEIGHT: 64 IN | DIASTOLIC BLOOD PRESSURE: 62 MMHG | WEIGHT: 121 LBS | SYSTOLIC BLOOD PRESSURE: 130 MMHG | RESPIRATION RATE: 16 BRPM | OXYGEN SATURATION: 96 % | TEMPERATURE: 97.3 F | BODY MASS INDEX: 20.66 KG/M2

## 2020-09-02 DIAGNOSIS — H69.91 DYSFUNCTION OF RIGHT EUSTACHIAN TUBE: ICD-10-CM

## 2020-09-02 PROCEDURE — 99214 OFFICE O/P EST MOD 30 MIN: CPT | Performed by: PHYSICIAN ASSISTANT

## 2020-09-02 RX ORDER — LEVOTHYROXINE SODIUM 0.07 MG/1
TABLET ORAL
COMMUNITY
End: 2021-02-02

## 2020-09-02 RX ORDER — SIMVASTATIN 20 MG
TABLET ORAL
COMMUNITY
End: 2020-12-14

## 2020-09-02 RX ORDER — SUMATRIPTAN 50 MG/1
TABLET, FILM COATED ORAL
COMMUNITY
End: 2021-02-02

## 2020-09-02 RX ORDER — FLUTICASONE PROPIONATE 50 MCG
1 SPRAY, SUSPENSION (ML) NASAL DAILY
Qty: 16 G | Refills: 0 | Status: SHIPPED | OUTPATIENT
Start: 2020-09-02 | End: 2021-08-31 | Stop reason: SDUPTHER

## 2020-09-02 ASSESSMENT — ENCOUNTER SYMPTOMS
HEADACHES: 0
SINUS PAIN: 0
COUGH: 0
ABDOMINAL PAIN: 0
FATIGUE: 0
VOMITING: 0
CHILLS: 0
FEVER: 0
VERTIGO: 0
SORE THROAT: 0
SWOLLEN GLANDS: 0

## 2020-09-02 ASSESSMENT — FIBROSIS 4 INDEX: FIB4 SCORE: 1.41

## 2020-09-02 NOTE — PROGRESS NOTES
Subjective:      Radha Land is a 70 y.o. female who presents with Ear Fullness (R ear discomfort, poss infection, feels like water in ear, x12 days )            Ear Fullness  This is a new problem. The current episode started yesterday. The problem occurs constantly. The problem has been unchanged. Associated symptoms include congestion. Pertinent negatives include no abdominal pain, chills, coughing, fatigue, fever, headaches, sore throat, swollen glands, vertigo or vomiting. Nothing aggravates the symptoms. She has tried nothing for the symptoms. The treatment provided no relief.       PMH:  has a past medical history of Hyperlipidemia, Migraine, and Thyroid disease.  MEDS:   Current Outpatient Medications:   •  simvastatin (ZOCOR) 20 MG Tab, simvastatin 20 mg tablet, Disp: , Rfl:   •  fluticasone (FLONASE) 50 MCG/ACT nasal spray, Spray 1 Spray in nose every day., Disp: 16 g, Rfl: 0  •  Cholecalciferol (D3-1000) 1000 UNIT Cap, Take 4,000 Units by mouth., Disp: , Rfl:   •  acetaminophen (TYLENOL 8 HOUR) 650 MG CR tablet, Take 650 mg by mouth every 6 hours as needed., Disp: , Rfl:   •  ibuprofen (MOTRIN) 200 MG Tab, Take 200 mg by mouth every 6 hours as needed., Disp: , Rfl:   •  SUMAtriptan (IMITREX) 50 MG Tab, Take 1 Tab by mouth Once PRN., Disp: 10 Tab, Rfl: 3  •  atorvastatin (LIPITOR) 20 MG Tab, Take 1 Tab by mouth every evening., Disp: 90 Tab, Rfl: 3  •  levothyroxine (SYNTHROID) 75 MCG Tab, Take 1 Tab by mouth Every morning on an empty stomach., Disp: 90 Tab, Rfl: 3  •  Magnesium 250 MG Tab, Take  by mouth., Disp: , Rfl:   •  B Complex-C (SUPER B COMPLEX PO), Take 1 Tab by mouth every day., Disp: , Rfl:   •  Ascorbic Acid (VITAMIN C) 1000 MG Tab, Take 1 Tab by mouth every day., Disp: , Rfl:   •  Calcium Carbonate-Vitamin D (CALCIUM-CARB 600 + D PO), Take 1 Tab by mouth every day., Disp: , Rfl:   •  Coenzyme Q10-Levocarnitine (CO Q-10 PLUS PO), Take 1 Tab by mouth every day., Disp: , Rfl:   •   docusate sodium (COLACE) 250 MG capsule, Take 100 mg by mouth every day., Disp: , Rfl:   •  SUMAtriptan (IMITREX) 50 MG Tab, sumatriptan 50 mg tablet, Disp: , Rfl:   •  levothyroxine (SYNTHROID) 75 MCG Tab, Synthroid 75 mcg tablet, Disp: , Rfl:   •  NON SPECIFIED, Multi Vitamin, Disp: , Rfl:   •  NON SPECIFIED, Imitrex, Disp: , Rfl:   •  NON SPECIFIED, hydrocortisone-pramoxine 2.5 %-1 % rectal cream, Disp: , Rfl:   •  NON SPECIFIED, Calcium 500 + D, Disp: , Rfl:   ALLERGIES: No Known Allergies  SURGHX:   Past Surgical History:   Procedure Laterality Date   • HEMORRHOIDECTOMY  9/12/2017    Procedure: HEMORRHOIDECTOMY- ARTERY LIGATION, RECTAL ANO REPAIR;  Surgeon: Aleksandr Quinteros M.D.;  Location: SURGERY [facility];  Service: General   • ABDOMINAL HYSTERECTOMY TOTAL      Fibroids // 1990   • OTHER      hemrroidectomy   • OTHER ORTHOPEDIC SURGERY      surgery to big toe   • THYROIDECTOMY      partial     SOCHX:  reports that she quit smoking about 35 years ago. She has a 7.50 pack-year smoking history. She has never used smokeless tobacco. She reports current alcohol use of about 2.4 oz of alcohol per week. She reports that she does not use drugs.  FH: family history includes Cancer in her maternal grandmother and another family member; Cancer (age of onset: 90) in her mother; Diabetes in her brother, father, and sister; Heart Disease in her father; Heart Failure in her father; Hyperlipidemia in her mother; Other in her father; Thyroid in her mother.    Review of Systems   Constitutional: Negative for chills, fatigue and fever.   HENT: Positive for congestion and hearing loss. Negative for ear discharge, ear pain, nosebleeds, sinus pain, sore throat and tinnitus.    Respiratory: Negative for cough.    Gastrointestinal: Negative for abdominal pain and vomiting.   Neurological: Negative for vertigo and headaches.        Medications, Allergies, and current problem list reviewed today in Epic     Objective:     BP  "130/62 (BP Location: Right arm, Patient Position: Sitting, BP Cuff Size: Adult)   Pulse 64   Temp 36.3 °C (97.3 °F) (Temporal)   Resp 16   Ht 1.626 m (5' 4\")   Wt 54.9 kg (121 lb)   SpO2 96%   BMI 20.77 kg/m²      Physical Exam  Vitals signs and nursing note reviewed.   Constitutional:       General: She is not in acute distress.     Appearance: She is well-developed. She is not diaphoretic.   HENT:      Head: Normocephalic and atraumatic.      Right Ear: Tympanic membrane, ear canal and external ear normal. Decreased hearing noted. No drainage, swelling or tenderness. There is no impacted cerumen. Tympanic membrane is not perforated, erythematous or bulging.      Left Ear: Hearing, tympanic membrane, ear canal and external ear normal.      Nose: Congestion present. No rhinorrhea.      Mouth/Throat:      Mouth: Mucous membranes are moist.   Eyes:      Conjunctiva/sclera: Conjunctivae normal.   Neck:      Musculoskeletal: Normal range of motion and neck supple.   Cardiovascular:      Rate and Rhythm: Normal rate and regular rhythm.      Heart sounds: Normal heart sounds.   Pulmonary:      Effort: Pulmonary effort is normal. No respiratory distress.      Breath sounds: Normal breath sounds. No wheezing.   Lymphadenopathy:      Cervical: No cervical adenopathy.   Skin:     General: Skin is warm and dry.   Neurological:      Mental Status: She is alert and oriented to person, place, and time.   Psychiatric:         Behavior: Behavior normal.         Thought Content: Thought content normal.         Judgment: Judgment normal.                 Assessment/Plan:         1. Dysfunction of right eustachian tube  fluticasone (FLONASE) 50 MCG/ACT nasal spray     No signs of infection or obstruction.  Trial Flonase and daily allergy    Return to clinic or go to ED if symptoms worsen or persist. Indications for ED discussed at length. Patient voices understanding. Follow-up with your primary care provider in 3-5 days. Red " flags discussed. All side effects of medication discussed including allergic response, GI upset, tendon injury, etc.    Please note that this dictation was created using voice recognition software. I have made every reasonable attempt to correct obvious errors, but I expect that there are errors of grammar and possibly content that I did not discover before finalizing the note.

## 2020-10-24 ENCOUNTER — IMMUNIZATION (OUTPATIENT)
Dept: SOCIAL WORK | Facility: CLINIC | Age: 70
End: 2020-10-24
Payer: MEDICARE

## 2020-10-24 DIAGNOSIS — Z23 NEED FOR VACCINATION: ICD-10-CM

## 2020-10-24 PROCEDURE — G0008 ADMIN INFLUENZA VIRUS VAC: HCPCS | Performed by: REGISTERED NURSE

## 2020-10-24 PROCEDURE — 90662 IIV NO PRSV INCREASED AG IM: CPT | Performed by: REGISTERED NURSE

## 2020-11-16 ENCOUNTER — HOSPITAL ENCOUNTER (OUTPATIENT)
Dept: RADIOLOGY | Facility: MEDICAL CENTER | Age: 70
End: 2020-11-16
Attending: FAMILY MEDICINE
Payer: MEDICARE

## 2020-11-16 DIAGNOSIS — Z78.0 POSTMENOPAUSAL: ICD-10-CM

## 2020-11-16 PROCEDURE — 77080 DXA BONE DENSITY AXIAL: CPT

## 2020-12-14 DIAGNOSIS — E78.00 PURE HYPERCHOLESTEROLEMIA: ICD-10-CM

## 2020-12-14 DIAGNOSIS — E89.0 POSTOPERATIVE HYPOTHYROIDISM: ICD-10-CM

## 2020-12-14 RX ORDER — ATORVASTATIN CALCIUM 20 MG/1
TABLET, FILM COATED ORAL
Qty: 100 TAB | Refills: 1 | Status: SHIPPED | OUTPATIENT
Start: 2020-12-14 | End: 2021-07-06

## 2020-12-14 RX ORDER — LEVOTHYROXINE SODIUM 0.07 MG/1
TABLET ORAL
Qty: 90 TAB | Refills: 1 | Status: SHIPPED | OUTPATIENT
Start: 2020-12-14 | End: 2021-07-06

## 2021-01-04 NOTE — ED NOTES
1L of NS given through rafal lens after ERP gave numbing eye drops. Pt tolerated well. No other needs or complaints at this time.  
Assessment complete. ERP at BS. Call light within reach  
Pt D/C to home. D/C instructions and prescriptions given. Pt v/u. Pt leaves ED with no acute changes, complaints or concerns.  
Pt c/o R eye pain and redness. Pt states put swimmers ear drops in eye on accident this am. Pt states tried to flush eye, states some relief but was worried so came here. Denies blurred vision.  
2g/1g

## 2021-01-15 DIAGNOSIS — Z23 NEED FOR VACCINATION: ICD-10-CM

## 2021-01-25 ENCOUNTER — TELEPHONE (OUTPATIENT)
Dept: MEDICAL GROUP | Facility: PHYSICIAN GROUP | Age: 71
End: 2021-01-25

## 2021-01-25 NOTE — TELEPHONE ENCOUNTER
Phone Number Called: [phone] (home)       Call outcome: Did not leave a detailed message. Requested patient to call back.    Message: lvm for pt to call back to complete AWV pvp.

## 2021-02-02 ENCOUNTER — TELEMEDICINE (OUTPATIENT)
Dept: MEDICAL GROUP | Facility: PHYSICIAN GROUP | Age: 71
End: 2021-02-02
Payer: MEDICARE

## 2021-02-02 VITALS — WEIGHT: 120 LBS | TEMPERATURE: 99.1 F | HEIGHT: 64 IN | BODY MASS INDEX: 20.49 KG/M2

## 2021-02-02 DIAGNOSIS — Z12.31 ENCOUNTER FOR SCREENING MAMMOGRAM FOR BREAST CANCER: ICD-10-CM

## 2021-02-02 DIAGNOSIS — G43.009 MIGRAINE WITHOUT AURA AND WITHOUT STATUS MIGRAINOSUS, NOT INTRACTABLE: ICD-10-CM

## 2021-02-02 DIAGNOSIS — K64.9 HEMORRHOIDS, UNSPECIFIED HEMORRHOID TYPE: ICD-10-CM

## 2021-02-02 DIAGNOSIS — F33.42 RECURRENT MAJOR DEPRESSIVE DISORDER, IN FULL REMISSION (HCC): ICD-10-CM

## 2021-02-02 DIAGNOSIS — M17.12 PRIMARY OSTEOARTHRITIS OF LEFT KNEE: ICD-10-CM

## 2021-02-02 DIAGNOSIS — E78.00 PURE HYPERCHOLESTEROLEMIA: ICD-10-CM

## 2021-02-02 DIAGNOSIS — L98.2 SWEET SYNDROME: ICD-10-CM

## 2021-02-02 DIAGNOSIS — E89.0 POSTOPERATIVE HYPOTHYROIDISM: ICD-10-CM

## 2021-02-02 DIAGNOSIS — Z00.00 MEDICARE ANNUAL WELLNESS VISIT, SUBSEQUENT: Primary | ICD-10-CM

## 2021-02-02 DIAGNOSIS — M85.80 OSTEOPENIA WITH HIGH RISK OF FRACTURE: ICD-10-CM

## 2021-02-02 DIAGNOSIS — E55.9 VITAMIN D INSUFFICIENCY: ICD-10-CM

## 2021-02-02 PROBLEM — R94.4 DECREASED GFR: Status: RESOLVED | Noted: 2018-06-26 | Resolved: 2021-02-02

## 2021-02-02 PROCEDURE — G0439 PPPS, SUBSEQ VISIT: HCPCS | Mod: 95,CR | Performed by: FAMILY MEDICINE

## 2021-02-02 RX ORDER — SUMATRIPTAN 50 MG/1
50 TABLET, FILM COATED ORAL
Qty: 10 TAB | Refills: 1 | Status: SHIPPED | OUTPATIENT
Start: 2021-02-02 | End: 2021-08-31 | Stop reason: SDUPTHER

## 2021-02-02 RX ORDER — HYDROCORTISONE ACETATE 25 MG/1
25 SUPPOSITORY RECTAL
Qty: 30 SUPPOSITORY | Refills: 1 | Status: SHIPPED
Start: 2021-02-02 | End: 2021-08-31

## 2021-02-02 ASSESSMENT — ENCOUNTER SYMPTOMS: GENERAL WELL-BEING: EXCELLENT

## 2021-02-02 ASSESSMENT — ACTIVITIES OF DAILY LIVING (ADL): BATHING_REQUIRES_ASSISTANCE: 0

## 2021-02-02 ASSESSMENT — PATIENT HEALTH QUESTIONNAIRE - PHQ9: CLINICAL INTERPRETATION OF PHQ2 SCORE: 0

## 2021-02-02 ASSESSMENT — FIBROSIS 4 INDEX: FIB4 SCORE: 1.43

## 2021-02-02 NOTE — PROGRESS NOTES
Chief Complaint   Patient presents with   • Annual Wellness Visit     This visit was conducted via Zoom using secure and encrypted videoconferencing technology. The patient was in a private location in the state of [place].    The patient's identity was confirmed and verbal consent was obtained for this virtual visit.    HPI:  Radha Land is a 71 y.o. here for Medicare Annual Wellness Visit     Patient is here on virtual visit today for her annual wellness visit.  She has no specific concerns.  She did recently have a DEXA scan this past fall which showed worsening osteopenia.  Patient wants to discuss today what the next steps are with regard to that.  Otherwise she is doing well on all of her medications and up-to-date on lab work.    Patient Active Problem List    Diagnosis Date Noted   • Primary osteoarthritis of left knee 01/24/2018   • Vitamin D insufficiency 06/13/2017   • Recurrent major depressive disorder, in full remission (HCC) 11/14/2016   • Migraine without aura and without status migrainosus, not intractable 06/01/2015   • Osteopenia with high risk of fracture 06/01/2015   • Postoperative hypothyroidism 09/02/2014   • Pure hypercholesterolemia 09/02/2014   • History of Sweet syndrome 09/02/2014       Current Outpatient Medications   Medication Sig Dispense Refill   • Potassium 99 MG Tab Take  by mouth every 48 hours.     • hydrocortisone (ANUSOL-HC) 25 MG Suppos Insert 1 Suppository into the rectum 1 time a day as needed. 30 Suppository 1   • SUMAtriptan (IMITREX) 50 MG Tab Take 1 Tab by mouth one time as needed for Migraine for up to 1 dose. 10 Tab 1   • atorvastatin (LIPITOR) 20 MG Tab TAKE ONE TABLET BY MOUTH EVERY EVENING  100 Tab 1   • levothyroxine (SYNTHROID) 75 MCG Tab TAKE 1 TABLET BY MOUTH IN THE MORNING ON AN EMPTY STOMACH. 90 Tab 1   • fluticasone (FLONASE) 50 MCG/ACT nasal spray Spray 1 Spray in nose every day. 16 g 0   • Cholecalciferol (D3-1000) 1000 UNIT Cap Take 4,000 Units by  mouth.     • acetaminophen (TYLENOL 8 HOUR) 650 MG CR tablet Take 650 mg by mouth every 6 hours as needed.     • ibuprofen (MOTRIN) 200 MG Tab Take 200 mg by mouth every 6 hours as needed.     • Magnesium 250 MG Tab Take  by mouth.     • B Complex-C (SUPER B COMPLEX PO) Take 1 Tab by mouth every day.     • Ascorbic Acid (VITAMIN C) 1000 MG Tab Take 1 Tab by mouth every day.     • Calcium Carbonate-Vitamin D (CALCIUM-CARB 600 + D PO) Take 1 Tab by mouth every day.     • Coenzyme Q10-Levocarnitine (CO Q-10 PLUS PO) Take 1 Tab by mouth every day.     • docusate sodium (COLACE) 250 MG capsule Take 100 mg by mouth every day.       No current facility-administered medications for this visit.             Current supplements as per medication list.       Allergies: Patient has no known allergies.    Current social contact/activities:     She  reports that she quit smoking about 35 years ago. She has a 7.50 pack-year smoking history. She has never used smokeless tobacco. She reports current alcohol use of about 2.4 oz of alcohol per week. She reports that she does not use drugs.  Counseling given: Not Answered  Comment: smoked for 14 years 7 pack year history      DPA/Advanced Directive:  Patient has Advanced Directive on file.     ROS:    Gait: Uses no assistive device  Ostomy: No  Other tubes: No  Amputations: No  Chronic oxygen use: No  Last eye exam: 6/2020  Wears hearing aids: No   : Denies any urinary leakage during the last 6 months    Screening:    Depression Screening    Little interest or pleasure in doing things?  0 - not at all  Feeling down, depressed , or hopeless? 0 - not at all  Patient Health Questionnaire Score: 0     If depressive symptoms identified deferred to follow up visit unless specifically addressed in assessment and plan.    Interpretation of PHQ-9 Total Score   Score Severity   1-4 No Depression   5-9 Mild Depression   10-14 Moderate Depression   15-19 Moderately Severe Depression   20-27  Severe Depression    Screening for Cognitive Impairment    Three Minute Recall (river, sarah, finger)  /3    Cristi clock face with all 12 numbers and set the hands to show 10 past 11.       Cognitive concerns identified deferred for follow up unless specifically addressed in assessment and plan.    Fall Risk Assessment    Has the patient had two or more falls in the last year or any fall with injury in the last year?  No    Safety Assessment    Throw rugs on floor.  Yes  Handrails on all stairs.  Yes  Good lighting in all hallways.  Yes  Difficulty hearing.  Yes  Patient counseled about all safety risks that were identified.    Functional Assessment ADLs    Are there any barriers preventing you from cooking for yourself or meeting nutritional needs?  No.    Are there any barriers preventing you from driving safely or obtaining transportation?  No.    Are there any barriers preventing you from using a telephone or calling for help?  No.    Are there any barriers preventing you from shopping?  No.    Are there any barriers preventing you from taking care of your own finances?  No.    Are there any barriers preventing you from managing your medications?  No.    Are there any barriers preventing you from showering, bathing or dressing yourself?  No.    Are you currently engaging in any exercise or physical activity?  Yes.     What is your perception of your health?  Excellent.      Health Maintenance Summary                COVID-19 Vaccine Overdue 1/10/1966     MAMMOGRAM Next Due 2/4/2021      Done 2/4/2020 MA-SCREENING MAMMO BILAT W/TOMOSYNTHESIS W/CAD     Patient has more history with this topic...    Annual Wellness Visit Next Due 2/3/2022      Done 2/2/2021 Visit Dx: Medicare annual wellness visit, subsequent     Patient has more history with this topic...    IMM DTaP/Tdap/Td Vaccine Next Due 9/2/2024      Done 9/2/2014 Imm Admin: Tdap Vaccine    BONE DENSITY Next Due 11/16/2025      Done 11/16/2020 DS-BONE DENSITY  "STUDY (DEXA)     Patient has more history with this topic...    COLONOSCOPY Next Due 2027      Done 2017 REFERRAL TO GI FOR COLONOSCOPY     Patient has more history with this topic...          Patient Care Team:  Kary De La Paz D.O. as PCP - General (Family Medicine)  Johnny You M.D. as Consulting Physician (Gastroenterology)  SANDOVAL Morales as Mid Level Provider (Dermatology)  Cristal Allen        Social History     Tobacco Use   • Smoking status: Former Smoker     Packs/day: 0.50     Years: 15.00     Pack years: 7.50     Quit date: 1985     Years since quittin.7   • Smokeless tobacco: Never Used   • Tobacco comment: smoked for 14 years 7 pack year history   Substance Use Topics   • Alcohol use: Yes     Alcohol/week: 2.4 oz     Types: 4 Glasses of wine per week     Frequency: 4 or more times a week     Drinks per session: 1 or 2     Comment: 1 glass of beer a day   • Drug use: No     Family History   Problem Relation Age of Onset   • Hyperlipidemia Mother    • Thyroid Mother         Hypothyroid   • Cancer Mother 90        uterine cancer   • Diabetes Father    • Heart Failure Father    • Heart Disease Father         Rhumatic vavular heart disease   • Other Father         rheumatic fever   • Diabetes Sister    • Cancer Other         breast   • Diabetes Brother    • Cancer Maternal Grandmother         uterine     She  has a past medical history of Hyperlipidemia, Migraine, and Thyroid disease.   Past Surgical History:   Procedure Laterality Date   • HEMORRHOIDECTOMY  2017    Procedure: HEMORRHOIDECTOMY- ARTERY LIGATION, RECTAL ANO REPAIR;  Surgeon: Aleksandr Quinteros M.D.;  Location: SURGERY [facility];  Service: General   • ABDOMINAL HYSTERECTOMY TOTAL      Fibroids 1990   • OTHER      hemrroidectomy   • OTHER ORTHOPEDIC SURGERY      surgery to big toe   • THYROIDECTOMY      partial       Exam:   Temp 37.3 °C (99.1 °F) (Temporal)   Ht 1.626 m (5' 4\")   Wt 54.4 " kg (120 lb)  Body mass index is 20.6 kg/m².    Hearing excellent.    Dentition good  Alert, oriented in no acute distress.  Eye contact is good, speech goal directed, affect calm    Assessment and Plan. The following treatment and monitoring plan is recommended:      Radha was seen today for annual wellness visit.    Diagnoses and all orders for this visit:    Medicare annual wellness visit, subsequent    Encounter for screening mammogram for breast cancer  -     MA-SCREENING MAMMO BILAT W/TOMOSYNTHESIS W/CAD; Future    Hemorrhoids, unspecified hemorrhoid type  Chronic issue, she has had hemorrhoidectomy in the past.  Notes hemorrhoids have intermittently been acting up.  We will go ahead and send prescription for Anusol suppositories.  -     hydrocortisone (ANUSOL-HC) 25 MG Suppos; Insert 1 Suppository into the rectum 1 time a day as needed.    Migraine without aura and without status migrainosus, not intractable  Chronic intermittent issue, about once per month.  Imitrex refilled today.  -     SUMAtriptan (IMITREX) 50 MG Tab; Take 1 Tab by mouth one time as needed for Migraine for up to 1 dose.    Primary osteoarthritis of left knee  Chronic issue, managed with lifestyle choices and walking frequently.    Vitamin D insufficiency  Chronic, well controlled on vitamin D supplementation.    Recurrent major depressive disorder, in full remission (HCC)  Patient notes in remission.  States depression was related to Trump getting elected, now resolved.     Osteopenia with high risk of fracture  Chronic and worsening based on most recent DEXA scan.  At this point, we discussed options for treatment of osteopenia versus close monitoring.  Patient opts to monitor more closely.  We will plan to get DEXA scan again in 2 years.  She currently engages in frequent weightbearing exercise and she does take vitamin D and calcium supplement.    History of Sweet syndrome  Previous issue, no current concerns.    Pure  hypercholesterolemia  Chronic, well controlled on statin.    Postoperative hypothyroidism  Chronic, well controlled on levothyroxine.        Services suggested: No services needed at this time  Health Care Screening: Age-appropriate preventive services recommended by USPTF and ACIP covered by Medicare were discussed today. Services ordered if indicated and agreed upon by the patient.  Referrals offered: Community-based lifestyle interventions to reduce health risks and promote self-management and wellness, fall prevention, nutrition, physical activity, tobacco-use cessation, weight loss, and mental health services as per orders if indicated.    Discussion today about general wellness and lifestyle habits:    · Prevent falls and reduce trip hazards; Cautioned about securing or removing rugs.  · Have a working fire alarm and carbon monoxide detector;   · Engage in regular physical activity and social activities     Follow-up: Return in about 6 months (around 8/2/2021).

## 2021-02-23 ENCOUNTER — HOSPITAL ENCOUNTER (OUTPATIENT)
Dept: RADIOLOGY | Facility: MEDICAL CENTER | Age: 71
End: 2021-02-23
Attending: FAMILY MEDICINE
Payer: MEDICARE

## 2021-02-23 DIAGNOSIS — Z12.31 ENCOUNTER FOR SCREENING MAMMOGRAM FOR BREAST CANCER: ICD-10-CM

## 2021-02-23 PROCEDURE — 77063 BREAST TOMOSYNTHESIS BI: CPT

## 2021-05-28 ENCOUNTER — PATIENT OUTREACH (OUTPATIENT)
Dept: HEALTH INFORMATION MANAGEMENT | Facility: OTHER | Age: 71
End: 2021-05-28

## 2021-05-28 NOTE — PROGRESS NOTES
Outcome: Self Referral scheduled Comprehensive Health Assessment with XUAN Dumont - [address] Tuesday, June 22, 2021  8:00 am (1 hour)    Please transfer to Patient Outreach Team at [phone] when patient returns call.    Attempt # 1

## 2021-06-18 NOTE — PROGRESS NOTES
Outcome: unable leave voicemail to confirm appointment     Please transfer to Patient Outreach Team at [phone] when patient returns call.    Attempt #

## 2021-06-22 PROBLEM — I79.8 OTHER DISORDERS OF ARTERIES, ARTERIOLES AND CAPILLARIES IN DISEASES CLASSIFIED ELSEWHERE (HCC): Status: ACTIVE | Noted: 2021-06-22

## 2021-08-23 ENCOUNTER — TELEPHONE (OUTPATIENT)
Dept: MEDICAL GROUP | Facility: PHYSICIAN GROUP | Age: 71
End: 2021-08-23

## 2021-08-23 NOTE — TELEPHONE ENCOUNTER
ESTABLISHED PATIENT PRE-VISIT PLANNING     Patient was NOT contacted to complete PVP.     Note: Patient will not be contacted if there is no indication to call.     1.  Reviewed notes from the last few office visits within the medical group: Yes    2.  If any orders were placed at last visit or intended to be done for this visit (i.e. 6 mos follow-up), do we have Results/Consult Notes?         •  Labs - Not done but does have upcoming appointment with Lab prior to office visit.   Note: If patient appointment is for lab review and patient did not complete labs, check with provider if OK to reschedule patient until labs completed.       •  Imaging - Imaging ordered, completed and results are in chart.       •  Referrals - No referrals were ordered at last office visit.    3. Is this appointment scheduled as a Hospital Follow-Up? No    4.  Immunizations were updated in Epic using Reconcile Outside Information activity? Yes    5.  Patient is due for the following Health Maintenance Topics:   There are no preventive care reminders to display for this patient.    6.  AHA (Pulse8) form printed for Provider? Yes

## 2021-08-26 ENCOUNTER — HOSPITAL ENCOUNTER (OUTPATIENT)
Dept: LAB | Facility: MEDICAL CENTER | Age: 71
End: 2021-08-26
Attending: FAMILY MEDICINE
Payer: MEDICARE

## 2021-08-26 DIAGNOSIS — E55.9 VITAMIN D DEFICIENCY: ICD-10-CM

## 2021-08-26 DIAGNOSIS — E89.0 POSTOPERATIVE HYPOTHYROIDISM: ICD-10-CM

## 2021-08-26 DIAGNOSIS — E78.00 PURE HYPERCHOLESTEROLEMIA: ICD-10-CM

## 2021-08-26 LAB
25(OH)D3 SERPL-MCNC: 67 NG/ML (ref 30–100)
ALBUMIN SERPL BCP-MCNC: 4.2 G/DL (ref 3.2–4.9)
ALBUMIN/GLOB SERPL: 1.4 G/DL
ALP SERPL-CCNC: 68 U/L (ref 30–99)
ALT SERPL-CCNC: 19 U/L (ref 2–50)
ANION GAP SERPL CALC-SCNC: 13 MMOL/L (ref 7–16)
AST SERPL-CCNC: 25 U/L (ref 12–45)
BILIRUB SERPL-MCNC: 0.5 MG/DL (ref 0.1–1.5)
BUN SERPL-MCNC: 12 MG/DL (ref 8–22)
CALCIUM SERPL-MCNC: 9.5 MG/DL (ref 8.5–10.5)
CHLORIDE SERPL-SCNC: 104 MMOL/L (ref 96–112)
CHOLEST SERPL-MCNC: 225 MG/DL (ref 100–199)
CO2 SERPL-SCNC: 24 MMOL/L (ref 20–33)
CREAT SERPL-MCNC: 0.72 MG/DL (ref 0.5–1.4)
ERYTHROCYTE [DISTWIDTH] IN BLOOD BY AUTOMATED COUNT: 46.1 FL (ref 35.9–50)
GLOBULIN SER CALC-MCNC: 2.9 G/DL (ref 1.9–3.5)
GLUCOSE SERPL-MCNC: 83 MG/DL (ref 65–99)
HCT VFR BLD AUTO: 44.7 % (ref 37–47)
HDLC SERPL-MCNC: 92 MG/DL
HGB BLD-MCNC: 14.9 G/DL (ref 12–16)
LDLC SERPL CALC-MCNC: 120 MG/DL
MCH RBC QN AUTO: 31.9 PG (ref 27–33)
MCHC RBC AUTO-ENTMCNC: 33.3 G/DL (ref 33.6–35)
MCV RBC AUTO: 95.7 FL (ref 81.4–97.8)
PLATELET # BLD AUTO: 261 K/UL (ref 164–446)
PMV BLD AUTO: 9.1 FL (ref 9–12.9)
POTASSIUM SERPL-SCNC: 3.9 MMOL/L (ref 3.6–5.5)
PROT SERPL-MCNC: 7.1 G/DL (ref 6–8.2)
RBC # BLD AUTO: 4.67 M/UL (ref 4.2–5.4)
SODIUM SERPL-SCNC: 141 MMOL/L (ref 135–145)
TRIGL SERPL-MCNC: 63 MG/DL (ref 0–149)
TSH SERPL DL<=0.005 MIU/L-ACNC: 0.86 UIU/ML (ref 0.38–5.33)
WBC # BLD AUTO: 6.9 K/UL (ref 4.8–10.8)

## 2021-08-26 PROCEDURE — 82306 VITAMIN D 25 HYDROXY: CPT

## 2021-08-26 PROCEDURE — 80053 COMPREHEN METABOLIC PANEL: CPT

## 2021-08-26 PROCEDURE — 36415 COLL VENOUS BLD VENIPUNCTURE: CPT

## 2021-08-26 PROCEDURE — 84443 ASSAY THYROID STIM HORMONE: CPT

## 2021-08-26 PROCEDURE — 80061 LIPID PANEL: CPT

## 2021-08-26 PROCEDURE — 85027 COMPLETE CBC AUTOMATED: CPT

## 2021-08-31 ENCOUNTER — TELEMEDICINE (OUTPATIENT)
Dept: MEDICAL GROUP | Facility: PHYSICIAN GROUP | Age: 71
End: 2021-08-31
Payer: MEDICARE

## 2021-08-31 VITALS — BODY MASS INDEX: 21.26 KG/M2 | TEMPERATURE: 98.4 F | HEIGHT: 63 IN | WEIGHT: 120 LBS

## 2021-08-31 DIAGNOSIS — H69.91 DYSFUNCTION OF RIGHT EUSTACHIAN TUBE: ICD-10-CM

## 2021-08-31 DIAGNOSIS — E55.9 VITAMIN D INSUFFICIENCY: ICD-10-CM

## 2021-08-31 DIAGNOSIS — E89.0 POSTOPERATIVE HYPOTHYROIDISM: ICD-10-CM

## 2021-08-31 DIAGNOSIS — K64.9 HEMORRHOIDS, UNSPECIFIED HEMORRHOID TYPE: ICD-10-CM

## 2021-08-31 DIAGNOSIS — E78.00 PURE HYPERCHOLESTEROLEMIA: ICD-10-CM

## 2021-08-31 DIAGNOSIS — G43.009 MIGRAINE WITHOUT AURA AND WITHOUT STATUS MIGRAINOSUS, NOT INTRACTABLE: ICD-10-CM

## 2021-08-31 PROCEDURE — 99214 OFFICE O/P EST MOD 30 MIN: CPT | Mod: 95,CR | Performed by: FAMILY MEDICINE

## 2021-08-31 RX ORDER — FLUTICASONE PROPIONATE 50 MCG
1 SPRAY, SUSPENSION (ML) NASAL DAILY
Qty: 16 G | Refills: 11 | Status: SHIPPED | OUTPATIENT
Start: 2021-08-31

## 2021-08-31 RX ORDER — LEVOTHYROXINE SODIUM 0.07 MG/1
TABLET ORAL
Qty: 90 TABLET | Refills: 3 | Status: SHIPPED | OUTPATIENT
Start: 2021-08-31 | End: 2022-08-24 | Stop reason: SDUPTHER

## 2021-08-31 RX ORDER — SUMATRIPTAN 50 MG/1
50 TABLET, FILM COATED ORAL
Qty: 10 TABLET | Refills: 3 | Status: SHIPPED | OUTPATIENT
Start: 2021-08-31 | End: 2022-08-24 | Stop reason: SDUPTHER

## 2021-08-31 RX ORDER — ATORVASTATIN CALCIUM 20 MG/1
TABLET, FILM COATED ORAL
Qty: 100 TABLET | Refills: 3 | Status: SHIPPED | OUTPATIENT
Start: 2021-08-31 | End: 2022-08-24 | Stop reason: SDUPTHER

## 2021-08-31 ASSESSMENT — FIBROSIS 4 INDEX: FIB4 SCORE: 1.58

## 2021-08-31 NOTE — PROGRESS NOTES
Virtual Visit: Established Patient   This visit was conducted via Zoom using secure and encrypted videoconferencing technology.   The patient was in a private location in the state of [place].    The patient's identity was confirmed and verbal consent was obtained for this virtual visit.    Subjective:   CC:   Chief Complaint   Patient presents with   • Medication Refill   • Hemorrhoids   • Results       Radha Land is a 71 y.o. female presenting for evaluation and management of:    Patient is here today for routine follow-up on chronic health conditions including hypothyroidism, migraine, high cholesterol.    She also has ongoing concern of hemorrhoids.      Current medicines (including changes today)  Current Outpatient Medications   Medication Sig Dispense Refill   • atorvastatin (LIPITOR) 20 MG Tab Take 1 tablet by mouth every evening 100 Tablet 3   • levothyroxine (SYNTHROID) 75 MCG Tab TAKE 1 TABLET BY MOUTH IN THE MORNING ON AN EMPTY STOMACH. 90 Tablet 3   • SUMAtriptan (IMITREX) 50 MG Tab Take 1 Tablet by mouth one time as needed for Migraine for up to 1 dose. 10 Tablet 3   • fluticasone (FLONASE) 50 MCG/ACT nasal spray Administer 1 Spray into affected nostril(S) every day. 16 g 11   • hydrocortisone 2.5 % Cream topical cream Apply to external rectal area daily for no more than 14 days at a time. 20 g 2     No current facility-administered medications for this visit.       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 ([facility]) 06/22/2021   • Primary osteoarthritis of left knee 01/24/2018   • Vitamin D insufficiency 06/13/2017   • Recurrent major depressive disorder, in full remission ([facility]) 11/14/2016   • Migraine without aura and without status migrainosus, not intractable 06/01/2015   • Osteopenia with high risk of fracture 06/01/2015   • Postoperative hypothyroidism  "09/02/2014   • Pure hypercholesterolemia 09/02/2014   • History of Sweet syndrome 09/02/2014        Objective:   Temp 36.9 °C (98.4 °F) (Temporal)   Ht 1.6 m (5' 3\")   Wt 54.4 kg (120 lb)   BMI 21.26 kg/m²     Physical Exam:  Constitutional: Alert, no distress, well-groomed.  Skin: No rashes in visible areas.  Eye: Round. Conjunctiva clear, lids normal. No icterus.   ENMT: Lips pink without lesions, good dentition, moist mucous membranes. Phonation normal.  Neck: No masses, no thyromegaly. Moves freely without pain.  Respiratory: Unlabored respiratory effort, no cough or audible wheeze  Psych: Alert and oriented x3, normal affect and mood.     Assessment and Plan:   The following treatment plan was discussed:     1. Pure hypercholesterolemia  This is a chronic issue for the patient.  We reviewed her labs from August 26, 2021.  Cholesterol has slightly risen since her last check about 14 months ago.  Total cholesterol 225 and .  She does note that she has not been able to swim due to Covid pandemic but has remained active and walking.  Recommend we continue the current dose of atorvastatin and recheck cholesterol in about 4 to 6 months and patient is agreeable to this plan.  - atorvastatin (LIPITOR) 20 MG Tab; Take 1 tablet by mouth every evening  Dispense: 100 Tablet; Refill: 3  - Lipid Profile; Future    2. Postoperative hypothyroidism  This is a chronic issue for the patient.  Most recent thyroid labs on August 26, all within normal limits.  Patient denies any concerns regarding this medication.  Refill sent to pharmacy.  - levothyroxine (SYNTHROID) 75 MCG Tab; TAKE 1 TABLET BY MOUTH IN THE MORNING ON AN EMPTY STOMACH.  Dispense: 90 Tablet; Refill: 3    3. Migraine without aura and without status migrainosus, not intractable  This is a chronic but intermittent issue for the patient.  She reports that she only needs to use the Imitrex once every 2 to 3 months.  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  She does need refills on her Imitrex at this time.  - SUMAtriptan (IMITREX) 50 MG Tab; Take 1 Tablet by mouth one time as needed for Migraine for up to 1 dose.  Dispense: 10 Tablet; Refill: 3    4. Dysfunction of right eustachian tube  - fluticasone (FLONASE) 50 MCG/ACT nasal spray; Administer 1 Spray into affected nostril(S) every day.  Dispense: 16 g; Refill: 11    5. Hemorrhoids, unspecified hemorrhoid type  Patient notes ongoing issue with hemorrhoids.  This is been a long-term concern as well.  She notes she has had hemorrhoid banding as well as hemorrhoid removal surgery in the past.  At her last visit, we did try anucort suppositories which she reports did not help at all.  She continues to have some discomfort in the rectal area but no bleeding.  She believes her hemorrhoids to be external as she can feel some bulging or fullness around the rectum.  She does use Preparation H at home.  She also notes that she has been using fiber supplement and Dulcolax.  Do recommend trialing MiraLAX, as this has a better safety profile than Dulcolax, she can titrate up to what she needs to keep stools soft.  We will also trial hydrocortisone cream rectally.  She is up-to-date on colonoscopy.  - hydrocortisone 2.5 % Cream topical cream; Apply to external rectal area daily for no more than 14 days at a time.  Dispense: 20 g; Refill: 2    6. Vitamin D insufficiency  Noted to be well controlled on recent labs with vitamin D level of 67.    Follow-up: Return in about 1 year (around 8/31/2022), or if symptoms worsen or fail to improve.

## 2021-10-28 PROBLEM — S46.011A STRAIN OF TENDON OF RIGHT ROTATOR CUFF: Status: ACTIVE | Noted: 2021-10-28

## 2021-10-28 PROBLEM — M75.41 IMPINGEMENT SYNDROME OF RIGHT SHOULDER: Status: ACTIVE | Noted: 2021-10-28

## 2021-10-28 PROBLEM — S43.431A SLAP LESION OF RIGHT SHOULDER: Status: ACTIVE | Noted: 2021-10-28

## 2021-11-03 ENCOUNTER — HOSPITAL ENCOUNTER (OUTPATIENT)
Facility: MEDICAL CENTER | Age: 71
End: 2021-11-03
Attending: ANESTHESIOLOGY
Payer: MEDICARE

## 2021-11-03 LAB — COVID ORDER STATUS COVID19: NORMAL

## 2021-11-03 PROCEDURE — U0005 INFEC AGEN DETEC AMPLI PROBE: HCPCS

## 2021-11-03 PROCEDURE — U0003 INFECTIOUS AGENT DETECTION BY NUCLEIC ACID (DNA OR RNA); SEVERE ACUTE RESPIRATORY SYNDROME CORONAVIRUS 2 (SARS-COV-2) (CORONAVIRUS DISEASE [COVID-19]), AMPLIFIED PROBE TECHNIQUE, MAKING USE OF HIGH THROUGHPUT TECHNOLOGIES AS DESCRIBED BY CMS-2020-01-R: HCPCS

## 2021-11-04 LAB
SARS-COV-2 RNA RESP QL NAA+PROBE: NOTDETECTED
SPECIMEN SOURCE: NORMAL

## 2021-11-08 PROBLEM — G89.18 POSTOPERATIVE PAIN: Status: ACTIVE | Noted: 2021-11-08

## 2022-02-04 ENCOUNTER — OFFICE VISIT (OUTPATIENT)
Dept: MEDICAL GROUP | Facility: PHYSICIAN GROUP | Age: 72
End: 2022-02-04
Payer: MEDICARE

## 2022-02-04 VITALS
BODY MASS INDEX: 22.01 KG/M2 | RESPIRATION RATE: 16 BRPM | OXYGEN SATURATION: 99 % | HEART RATE: 64 BPM | DIASTOLIC BLOOD PRESSURE: 62 MMHG | HEIGHT: 63 IN | SYSTOLIC BLOOD PRESSURE: 130 MMHG | TEMPERATURE: 99.2 F | WEIGHT: 124.2 LBS

## 2022-02-04 DIAGNOSIS — Z12.31 ENCOUNTER FOR SCREENING MAMMOGRAM FOR BREAST CANCER: ICD-10-CM

## 2022-02-04 DIAGNOSIS — E78.5 HYPERLIPIDEMIA, UNSPECIFIED HYPERLIPIDEMIA TYPE: ICD-10-CM

## 2022-02-04 DIAGNOSIS — G43.009 MIGRAINE WITHOUT AURA AND WITHOUT STATUS MIGRAINOSUS, NOT INTRACTABLE: ICD-10-CM

## 2022-02-04 DIAGNOSIS — Z86.39 HISTORY OF VITAMIN D DEFICIENCY: ICD-10-CM

## 2022-02-04 DIAGNOSIS — Z76.89 ENCOUNTER TO ESTABLISH CARE: ICD-10-CM

## 2022-02-04 DIAGNOSIS — I79.8 OTHER DISORDERS OF ARTERIES, ARTERIOLES AND CAPILLARIES IN DISEASES CLASSIFIED ELSEWHERE (HCC): ICD-10-CM

## 2022-02-04 DIAGNOSIS — G47.62 NOCTURNAL LEG CRAMPS: ICD-10-CM

## 2022-02-04 DIAGNOSIS — F33.42 RECURRENT MAJOR DEPRESSIVE DISORDER, IN FULL REMISSION (HCC): ICD-10-CM

## 2022-02-04 DIAGNOSIS — F51.01 PRIMARY INSOMNIA: ICD-10-CM

## 2022-02-04 PROBLEM — H25.9 AGE-RELATED CATARACT OF BOTH EYES: Status: ACTIVE | Noted: 2022-02-04

## 2022-02-04 PROBLEM — S46.011A STRAIN OF TENDON OF RIGHT ROTATOR CUFF: Status: RESOLVED | Noted: 2021-10-28 | Resolved: 2022-02-04

## 2022-02-04 PROBLEM — S43.431A SLAP LESION OF RIGHT SHOULDER: Status: RESOLVED | Noted: 2021-10-28 | Resolved: 2022-02-04

## 2022-02-04 PROBLEM — M75.41 IMPINGEMENT SYNDROME OF RIGHT SHOULDER: Status: RESOLVED | Noted: 2021-10-28 | Resolved: 2022-02-04

## 2022-02-04 PROBLEM — G89.29 CHRONIC RIGHT SHOULDER PAIN: Status: ACTIVE | Noted: 2022-02-04

## 2022-02-04 PROBLEM — E55.9 VITAMIN D INSUFFICIENCY: Status: RESOLVED | Noted: 2017-06-13 | Resolved: 2022-02-04

## 2022-02-04 PROBLEM — M25.511 CHRONIC RIGHT SHOULDER PAIN: Status: ACTIVE | Noted: 2022-02-04

## 2022-02-04 PROCEDURE — 99215 OFFICE O/P EST HI 40 MIN: CPT | Performed by: STUDENT IN AN ORGANIZED HEALTH CARE EDUCATION/TRAINING PROGRAM

## 2022-02-04 RX ORDER — IBUPROFEN 200 MG
200 TABLET ORAL
COMMUNITY

## 2022-02-04 ASSESSMENT — PATIENT HEALTH QUESTIONNAIRE - PHQ9
4. FEELING TIRED OR HAVING LITTLE ENERGY: NOT AT ALL
3. TROUBLE FALLING OR STAYING ASLEEP OR SLEEPING TOO MUCH: NEARLY EVERY DAY
6. FEELING BAD ABOUT YOURSELF - OR THAT YOU ARE A FAILURE OR HAVE LET YOURSELF OR YOUR FAMILY DOWN: NOT AL ALL
7. TROUBLE CONCENTRATING ON THINGS, SUCH AS READING THE NEWSPAPER OR WATCHING TELEVISION: NOT AT ALL
SUM OF ALL RESPONSES TO PHQ9 QUESTIONS 1 AND 2: 0
1. LITTLE INTEREST OR PLEASURE IN DOING THINGS: NOT AT ALL
5. POOR APPETITE OR OVEREATING: NOT AT ALL
8. MOVING OR SPEAKING SO SLOWLY THAT OTHER PEOPLE COULD HAVE NOTICED. OR THE OPPOSITE, BEING SO FIGETY OR RESTLESS THAT YOU HAVE BEEN MOVING AROUND A LOT MORE THAN USUAL: NOT AT ALL
9. THOUGHTS THAT YOU WOULD BE BETTER OFF DEAD, OR OF HURTING YOURSELF: NOT AT ALL
SUM OF ALL RESPONSES TO PHQ QUESTIONS 1-9: 3
2. FEELING DOWN, DEPRESSED, IRRITABLE, OR HOPELESS: NOT AT ALL

## 2022-02-04 ASSESSMENT — FIBROSIS 4 INDEX: FIB4 SCORE: 1.6

## 2022-02-04 NOTE — ASSESSMENT & PLAN NOTE
This is a chronic condition.  Patient is compliant with her atorvastatin 20 mg daily.  She states that she did have a discussion with her last PCP regarding increasing the medication but wanted to defer for the following year.

## 2022-02-04 NOTE — PROGRESS NOTES
Subjective:     CC: Establish care and discuss sleep problem    HISTORY OF THE PRESENT ILLNESS: Patient is a 72 y.o. female. This pleasant patient is here today to establish care and discuss sleep problem. His/her prior PCP was Dr. De La Paz.    Migraine without aura and without status migrainosus, not intractable  Chronic. Prior hormonal, improved after hysterectomy. Takes imitrex 50mg prn, rare use about every 3 months. Poor sleep now a trigger.    Primary insomnia  Chronic. Goes to bed at 2230, wakes in the middle of the night to urinate then can fall right to sleep. Wakes early [id number] in the morning usually since she has to urinate. Naps sridhar chair for an hour in the afternoon. Tried melatonin at one point, but may have upset her stomach. Tylenol PM helpful at times.    Drinks decaf coffee and tea (herbal or green tea, rare black). Doesn't drink anything with caffeine after 1600. Has a beer or glass of wine with dinner. Water with dinner and hot water in the evening (late). Feels refreshed in the morning.  She denies depression or anxiety.    She does report nocturnal cramping in the right thigh, denies in the calf.  Denies claudication or numbness and tingling in the distal extremities of the legs.    Hyperlipidemia  This is a chronic condition.  Patient is compliant with her atorvastatin 20 mg daily.  She states that she did have a discussion with her last PCP regarding increasing the medication but wanted to defer for the following year.    Patient recently had shoulder surgery on the right, continues to improve.  Off the prescription pain medicine and now taking ibuprofen but limits only to once a day which is effective.  Continues with physical therapy as her range of motion still has some work.    Current Outpatient Medications Ordered in Epic   Medication Sig Dispense Refill   • Ascorbic Acid (VITAMIN C PO) Take  by mouth.     • VITAMIN D PO Take  by mouth.     • coenzyme Q-10 30 MG capsule Take 60 mg by  "mouth every day.     • CALCIUM-VITAMIN D PO Take  by mouth.     • Cyanocobalamin (VITAMIN B12 PO) Take  by mouth.     • MAGNESIUM PO Take  by mouth.     • POTASSIUM PO Take  by mouth.     • ibuprofen (MOTRIN) 200 MG Tab Take 200 mg by mouth 1 time a day as needed.     • Psyllium (METAMUCIL PO) Take  by mouth every day.     • atorvastatin (LIPITOR) 20 MG Tab Take 1 tablet by mouth every evening 100 Tablet 3   • levothyroxine (SYNTHROID) 75 MCG Tab TAKE 1 TABLET BY MOUTH IN THE MORNING ON AN EMPTY STOMACH. 90 Tablet 3   • SUMAtriptan (IMITREX) 50 MG Tab Take 1 Tablet by mouth one time as needed for Migraine for up to 1 dose. 10 Tablet 3   • fluticasone (FLONASE) 50 MCG/ACT nasal spray Administer 1 Spray into affected nostril(S) every day. 16 g 11     No current [facility]-ordered facility-administered medications on file.     ROS:   Gen: no fevers/chills, no changes in weight  Eyes: no changes in vision  ENT: no sore throat  Pulm: no sob, no cough  CV: no chest pain, no palpitations  GI: no nausea/vomiting, no diarrhea or constipation, denies abdominal pain or blood in the stool.  No history of GI bleeding.  : no dysuria  MSk: Shoulder and leg pain, left knee pain  Skin: no rash  Neuro: no frequent headaches  Heme/Lymph: no easy bruising      Objective:     Exam: /62 (BP Location: Right arm, Patient Position: Sitting, BP Cuff Size: Adult)   Pulse 64   Temp 37.3 °C (99.2 °F) (Temporal)   Resp 16   Ht 1.6 m (5' 3\")   Wt 56.3 kg (124 lb 3.2 oz)   SpO2 99%  Body mass index is 22 kg/m².    General: Well developed, well nourished in no acute distress.  Head: Normocephalic and atraumatic.  Eyes: Conjunctivae and extraocular motions are normal. Pupils are equal, round. No scleral icterus.   Mouth/Throat: Wearing mask.  Neck: Supple.  Pulmonary: Clear to ausculation.  Normal effort. No rales, ronchi, or wheezing.  Cardiovascular: Regular rate and rhythm without murmur.  +2 dorsalis pedis on the left, difficult to " palpate pulses on the right LE.  Abdomen: Soft, nontender, nondistended. Normal bowel sounds. No HSM.  Neurologic: No gross/focal deficits. Normal gait.   Skin: Warm and dry.  No obvious lesions.  Musculoskeletal: No extremity cyanosis, clubbing, or edema.  Negative Homans bilaterally.  Psych: Normal mood and affect. Alert and oriented x3. Judgment and insight is normal.    Labs: Reviewed from 8/26/2021    Assessment & Plan:   72 y.o. female with the following -    1. Encounter to establish care  Medical record reviewed and updated with patient.    2. Primary insomnia  This is a chronic condition.  Discussed some sleep hygiene measures as well as adjusting her fluid intake and limiting her caffeine to not after noon.  If not effective may consider stopping alcohol at night as well.  Follow-up if measures not effective, declines sooner f/u.  - CBC WITH DIFFERENTIAL; Future  - TSH WITH REFLEX TO FT4; Future    3. Hyperlipidemia, unspecified hyperlipidemia type  This is a chronic condition.  We discussed her ASCVD risk which is greater than 10% as well as her abnormal Quanta flow screening last June.  Recommend that we increase her statin but she prefers to repeat labs and consider at that time.  Discussed that she has no contraindications to aspirin given that her ASCVD risk is greater than 10% I recommend she start 81 mg aspirin daily.  She declines but will consider.  The 10-year ASCVD risk score (Omid DC Jr., et al., 2013) is: 13.2%  - Lipid Profile; Future  - Comp Metabolic Panel; Future    4. History of vitamin D deficiency  We will trend with next labs.  Continue supplement.  - VITAMIN D,25 HYDROXY; Future    5. 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  Declines increasing statin and may consider aspirin.  Will check  lipid panel with annual labs and she reports that she does have a follow-up with geriatric specialty care this year so they can also consider repeating her Quanta flow to evaluate for changes.  - Lipid Profile; Future  - Comp Metabolic Panel; Future    6. Nocturnal leg cramps  This is a chronic condition.  Reviewed her past electrolytes, CBC and TSH-no concerns.  This may be related to peripheral vascular disease as she did have an abnormal Quanta flow on the right.  Follow-up for worsening, may consider vascular referral if needed.    7. Recurrent major depressive disorder, in full remission (HCC)  Prior history, no concerns today.    8. Migraine without aura and without status migrainosus, not intractable  This is a chronic condition, infrequent fortunately but poor sleep at times contributes.  Sleep discussed as above.  Okay to continue as needed Imitrex.    9. Encounter for screening mammogram for breast cancer  - MA-SCREENING MAMMO BILAT W/TOMOSYNTHESIS W/CAD; Future    I spent a total of 40 minutes with record review, exam, communication with the patient, and documentation of this encounter.    Return in about 7 months (around 9/4/2022), or if symptoms worsen or fail to improve, for Annual.    Please note that this dictation was created using voice recognition software. I have made every reasonable attempt to correct obvious errors, but I expect that there are errors of grammar and possibly content that I did not discover before finalizing the note.   normal

## 2022-02-04 NOTE — ASSESSMENT & PLAN NOTE
Chronic. Prior hormonal, improved after hysterectomy. Takes imitrex 50mg prn, rare use about every 3 months. Poor sleep now a trigger.

## 2022-02-04 NOTE — PATIENT INSTRUCTIONS
Sleep hygiene checklist:  -Avoid naps.  Napping during the day can disturb the normal pattern of sleep and wakefulness.  -Avoid stimulants, such as caffeine and nicotine, and alcohol as bedtime approaches.  While alcohol is well known to speed the onset of sleep, the process of the body metabolizing the alcohol can cause arousal, thus disrupting sleep.  -Exercise.  All forms of exercise help to ensure sound sleep.  Vigorous activity should be conducted in the morning or late afternoon, while a relaxing exercise, like yoga, can be done before bed to help initiate a restful night sleep.  -Avoid foods too close to bedtime-particularly large meals and chocolate (which contains caffeine).  And try not to make any significant change to your diet.  For example, if you are struggling with sleep problem, is not a good time to start experimenting with spicy dishes. A light snack prior to bed may help as well.  -Soak up some natural light.  This is particularly important for older people who may not venture outside as frequently as children in younger adults.  Light exposure helps maintain a healthy sleep-wake cycle.  -Establish a regular bedtime routine.  Try to avoid emotionally upsetting conversations and activities before going to sleep. Stick to a routine sleep and wake up time (within an hour) regardless of weekday.  -Avoid clock watching. Set your alarm if needed and avoid looking at the clock at night if you awake early which can cause excessive worry about lack of sleep.  -Get out of bed. If you have not fallen asleep in approximately 20-30 minutes get out of bed and do something 'boring' until you feel tired then return to bed.  -Associated bed with sleep.  Is not a good idea to watch television, use her computer or phone, listen to the radio, or read while in bed.  -Ensure a pleasant, relaxing sleep environment.  The bed should be comfortable, in the room should not be too hot, cold, or bright.

## 2022-02-04 NOTE — ASSESSMENT & PLAN NOTE
Chronic. Goes to bed at 2230, wakes in the middle of the night to urinate then can fall right to sleep. Wakes early [id number] in the morning usually since she has to urinate. Naps sridhar chair for an hour in the afternoon. Tried melatonin at one point, but may have upset her stomach. Tylenol PM helpful at times.    Drinks decaf coffee and tea (herbal or green tea, rare black). Doesn't drink anything with caffeine after 1600. Has a beer or glass of wine with dinner. Water with dinner and hot water in the evening (late). Feels refreshed in the morning.  She denies depression or anxiety.    She does report nocturnal cramping in the right thigh, denies in the calf.  Denies claudication or numbness and tingling in the distal extremities of the legs.

## 2022-03-14 ENCOUNTER — HOSPITAL ENCOUNTER (OUTPATIENT)
Dept: RADIOLOGY | Facility: MEDICAL CENTER | Age: 72
End: 2022-03-14
Attending: STUDENT IN AN ORGANIZED HEALTH CARE EDUCATION/TRAINING PROGRAM
Payer: MEDICARE

## 2022-03-14 DIAGNOSIS — Z12.31 ENCOUNTER FOR SCREENING MAMMOGRAM FOR BREAST CANCER: ICD-10-CM

## 2022-03-14 PROCEDURE — 77063 BREAST TOMOSYNTHESIS BI: CPT

## 2022-04-20 PROBLEM — I70.0 AORTIC ATHEROSCLEROSIS (HCC): Status: ACTIVE | Noted: 2022-04-20

## 2022-04-20 PROBLEM — I77.9 DISORDER OF ARTERIES AND ARTERIOLES (HCC): Status: ACTIVE | Noted: 2021-06-22

## 2022-08-23 ENCOUNTER — HOSPITAL ENCOUNTER (OUTPATIENT)
Dept: LAB | Facility: MEDICAL CENTER | Age: 72
End: 2022-08-23
Attending: STUDENT IN AN ORGANIZED HEALTH CARE EDUCATION/TRAINING PROGRAM
Payer: MEDICARE

## 2022-08-23 DIAGNOSIS — Z86.39 HISTORY OF VITAMIN D DEFICIENCY: ICD-10-CM

## 2022-08-23 DIAGNOSIS — F51.01 PRIMARY INSOMNIA: ICD-10-CM

## 2022-08-23 DIAGNOSIS — E78.5 HYPERLIPIDEMIA, UNSPECIFIED HYPERLIPIDEMIA TYPE: ICD-10-CM

## 2022-08-23 DIAGNOSIS — I79.8 OTHER DISORDERS OF ARTERIES, ARTERIOLES AND CAPILLARIES IN DISEASES CLASSIFIED ELSEWHERE (HCC): ICD-10-CM

## 2022-08-23 LAB
25(OH)D3 SERPL-MCNC: 75 NG/ML (ref 30–100)
ALBUMIN SERPL BCP-MCNC: 4.5 G/DL (ref 3.2–4.9)
ALBUMIN/GLOB SERPL: 2 G/DL
ALP SERPL-CCNC: 77 U/L (ref 30–99)
ALT SERPL-CCNC: 18 U/L (ref 2–50)
ANION GAP SERPL CALC-SCNC: 12 MMOL/L (ref 7–16)
AST SERPL-CCNC: 25 U/L (ref 12–45)
BASOPHILS # BLD AUTO: 1 % (ref 0–1.8)
BASOPHILS # BLD: 0.05 K/UL (ref 0–0.12)
BILIRUB SERPL-MCNC: 0.4 MG/DL (ref 0.1–1.5)
BUN SERPL-MCNC: 14 MG/DL (ref 8–22)
CALCIUM SERPL-MCNC: 9.2 MG/DL (ref 8.5–10.5)
CHLORIDE SERPL-SCNC: 106 MMOL/L (ref 96–112)
CHOLEST SERPL-MCNC: 192 MG/DL (ref 100–199)
CO2 SERPL-SCNC: 23 MMOL/L (ref 20–33)
CREAT SERPL-MCNC: 0.77 MG/DL (ref 0.5–1.4)
EOSINOPHIL # BLD AUTO: 0.11 K/UL (ref 0–0.51)
EOSINOPHIL NFR BLD: 2.2 % (ref 0–6.9)
ERYTHROCYTE [DISTWIDTH] IN BLOOD BY AUTOMATED COUNT: 45.1 FL (ref 35.9–50)
GFR SERPLBLD CREATININE-BSD FMLA CKD-EPI: 81 ML/MIN/1.73 M 2
GLOBULIN SER CALC-MCNC: 2.3 G/DL (ref 1.9–3.5)
GLUCOSE SERPL-MCNC: 85 MG/DL (ref 65–99)
HCT VFR BLD AUTO: 42.7 % (ref 37–47)
HDLC SERPL-MCNC: 85 MG/DL
HGB BLD-MCNC: 14.1 G/DL (ref 12–16)
IMM GRANULOCYTES # BLD AUTO: 0.03 K/UL (ref 0–0.11)
IMM GRANULOCYTES NFR BLD AUTO: 0.6 % (ref 0–0.9)
LDLC SERPL CALC-MCNC: 99 MG/DL
LYMPHOCYTES # BLD AUTO: 1.39 K/UL (ref 1–4.8)
LYMPHOCYTES NFR BLD: 28.2 % (ref 22–41)
MCH RBC QN AUTO: 31.9 PG (ref 27–33)
MCHC RBC AUTO-ENTMCNC: 33 G/DL (ref 33.6–35)
MCV RBC AUTO: 96.6 FL (ref 81.4–97.8)
MONOCYTES # BLD AUTO: 0.33 K/UL (ref 0–0.85)
MONOCYTES NFR BLD AUTO: 6.7 % (ref 0–13.4)
NEUTROPHILS # BLD AUTO: 3.02 K/UL (ref 2–7.15)
NEUTROPHILS NFR BLD: 61.3 % (ref 44–72)
NRBC # BLD AUTO: 0 K/UL
NRBC BLD-RTO: 0 /100 WBC
PLATELET # BLD AUTO: 212 K/UL (ref 164–446)
PMV BLD AUTO: 11.8 FL (ref 9–12.9)
POTASSIUM SERPL-SCNC: 4.3 MMOL/L (ref 3.6–5.5)
PROT SERPL-MCNC: 6.8 G/DL (ref 6–8.2)
RBC # BLD AUTO: 4.42 M/UL (ref 4.2–5.4)
SODIUM SERPL-SCNC: 141 MMOL/L (ref 135–145)
TRIGL SERPL-MCNC: 42 MG/DL (ref 0–149)
TSH SERPL DL<=0.005 MIU/L-ACNC: 0.56 UIU/ML (ref 0.38–5.33)
WBC # BLD AUTO: 4.9 K/UL (ref 4.8–10.8)

## 2022-08-23 PROCEDURE — 36415 COLL VENOUS BLD VENIPUNCTURE: CPT

## 2022-08-23 PROCEDURE — 85025 COMPLETE CBC W/AUTO DIFF WBC: CPT

## 2022-08-23 PROCEDURE — 84443 ASSAY THYROID STIM HORMONE: CPT

## 2022-08-23 PROCEDURE — 80061 LIPID PANEL: CPT

## 2022-08-23 PROCEDURE — 82306 VITAMIN D 25 HYDROXY: CPT

## 2022-08-23 PROCEDURE — 80053 COMPREHEN METABOLIC PANEL: CPT

## 2022-08-24 ENCOUNTER — OFFICE VISIT (OUTPATIENT)
Dept: MEDICAL GROUP | Facility: PHYSICIAN GROUP | Age: 72
End: 2022-08-24
Payer: MEDICARE

## 2022-08-24 VITALS
HEART RATE: 62 BPM | TEMPERATURE: 97 F | HEIGHT: 63 IN | DIASTOLIC BLOOD PRESSURE: 60 MMHG | SYSTOLIC BLOOD PRESSURE: 120 MMHG | BODY MASS INDEX: 21.62 KG/M2 | WEIGHT: 122 LBS

## 2022-08-24 DIAGNOSIS — G43.009 MIGRAINE WITHOUT AURA AND WITHOUT STATUS MIGRAINOSUS, NOT INTRACTABLE: ICD-10-CM

## 2022-08-24 DIAGNOSIS — E89.0 POSTOPERATIVE HYPOTHYROIDISM: ICD-10-CM

## 2022-08-24 DIAGNOSIS — E78.00 PURE HYPERCHOLESTEROLEMIA: ICD-10-CM

## 2022-08-24 DIAGNOSIS — M85.852 OSTEOPENIA OF LEFT HIP: ICD-10-CM

## 2022-08-24 DIAGNOSIS — Z00.00 ENCOUNTER FOR ANNUAL GENERAL MEDICAL EXAMINATION WITHOUT ABNORMAL FINDINGS IN ADULT: ICD-10-CM

## 2022-08-24 PROBLEM — I77.9 DISORDER OF ARTERIES AND ARTERIOLES (HCC): Status: RESOLVED | Noted: 2021-06-22 | Resolved: 2022-08-24

## 2022-08-24 PROBLEM — G89.29 CHRONIC PAIN OF LEFT KNEE: Status: ACTIVE | Noted: 2022-08-24

## 2022-08-24 PROBLEM — M25.562 CHRONIC PAIN OF LEFT KNEE: Status: ACTIVE | Noted: 2022-08-24

## 2022-08-24 PROCEDURE — 99214 OFFICE O/P EST MOD 30 MIN: CPT | Performed by: STUDENT IN AN ORGANIZED HEALTH CARE EDUCATION/TRAINING PROGRAM

## 2022-08-24 RX ORDER — ATORVASTATIN CALCIUM 20 MG/1
TABLET, FILM COATED ORAL
Qty: 100 TABLET | Refills: 3 | Status: SHIPPED | OUTPATIENT
Start: 2022-08-24 | End: 2023-07-21 | Stop reason: SDUPTHER

## 2022-08-24 RX ORDER — LEVOTHYROXINE SODIUM 0.07 MG/1
TABLET ORAL
Qty: 90 TABLET | Refills: 3 | Status: SHIPPED
Start: 2022-08-24 | End: 2023-04-22

## 2022-08-24 RX ORDER — SUMATRIPTAN 50 MG/1
50 TABLET, FILM COATED ORAL
Qty: 10 TABLET | Refills: 3 | Status: SHIPPED | OUTPATIENT
Start: 2022-08-24 | End: 2022-11-30 | Stop reason: SDUPTHER

## 2022-08-24 ASSESSMENT — FIBROSIS 4 INDEX: FIB4 SCORE: 2.03

## 2022-11-17 ENCOUNTER — HOSPITAL ENCOUNTER (OUTPATIENT)
Dept: RADIOLOGY | Facility: MEDICAL CENTER | Age: 72
End: 2022-11-17
Attending: STUDENT IN AN ORGANIZED HEALTH CARE EDUCATION/TRAINING PROGRAM
Payer: MEDICARE

## 2022-11-17 DIAGNOSIS — M85.852 OSTEOPENIA OF LEFT HIP: ICD-10-CM

## 2022-11-17 PROCEDURE — 77080 DXA BONE DENSITY AXIAL: CPT

## 2022-11-30 ENCOUNTER — OFFICE VISIT (OUTPATIENT)
Dept: MEDICAL GROUP | Facility: PHYSICIAN GROUP | Age: 72
End: 2022-11-30
Payer: MEDICARE

## 2022-11-30 VITALS
SYSTOLIC BLOOD PRESSURE: 118 MMHG | TEMPERATURE: 98.1 F | HEIGHT: 63 IN | RESPIRATION RATE: 20 BRPM | WEIGHT: 122 LBS | OXYGEN SATURATION: 95 % | DIASTOLIC BLOOD PRESSURE: 62 MMHG | BODY MASS INDEX: 21.62 KG/M2 | HEART RATE: 85 BPM

## 2022-11-30 DIAGNOSIS — E78.5 HYPERLIPIDEMIA, UNSPECIFIED HYPERLIPIDEMIA TYPE: ICD-10-CM

## 2022-11-30 DIAGNOSIS — M85.852 OSTEOPENIA OF LEFT HIP: ICD-10-CM

## 2022-11-30 DIAGNOSIS — G43.009 MIGRAINE WITHOUT AURA AND WITHOUT STATUS MIGRAINOSUS, NOT INTRACTABLE: ICD-10-CM

## 2022-11-30 PROBLEM — Z85.828 HISTORY OF SQUAMOUS CELL CARCINOMA OF SKIN: Status: ACTIVE | Noted: 2022-11-30

## 2022-11-30 PROCEDURE — 99214 OFFICE O/P EST MOD 30 MIN: CPT | Performed by: STUDENT IN AN ORGANIZED HEALTH CARE EDUCATION/TRAINING PROGRAM

## 2022-11-30 RX ORDER — SUMATRIPTAN 50 MG/1
50 TABLET, FILM COATED ORAL
Qty: 9 TABLET | Refills: 3 | Status: SHIPPED | OUTPATIENT
Start: 2022-11-30 | End: 2023-07-25 | Stop reason: SDUPTHER

## 2022-11-30 ASSESSMENT — FIBROSIS 4 INDEX: FIB4 SCORE: 2.03

## 2022-11-30 NOTE — PROGRESS NOTES
"Subjective:     Chief Complaint   Patient presents with    Other     Bone density test      HPI:   Radha presents today to discuss her bone density test.    Current Outpatient Medications Ordered in Epic   Medication Sig Dispense Refill    SUMAtriptan (IMITREX) 50 MG Tab Take 1 Tablet by mouth one time as needed for Migraine for up to 1 dose. 9 Tablet 3    levothyroxine (SYNTHROID) 75 MCG Tab TAKE 1 TABLET BY MOUTH IN THE MORNING ON AN EMPTY STOMACH. 90 Tablet 3    atorvastatin (LIPITOR) 20 MG Tab Take 1 tablet by mouth every evening 100 Tablet 3    Ascorbic Acid (VITAMIN C PO) Take  by mouth.      VITAMIN D PO Take 2,000 Int'l Units/day by mouth every day.      coenzyme Q-10 30 MG capsule Take 60 mg by mouth every day.      CALCIUM-VITAMIN D PO Take  by mouth.      Cyanocobalamin (VITAMIN B12 PO) Take  by mouth.      MAGNESIUM PO Take  by mouth.      POTASSIUM PO Take  by mouth.      Psyllium (METAMUCIL PO) Take  by mouth every day.      ibuprofen (MOTRIN) 200 MG Tab Take 200 mg by mouth 1 time a day as needed.      fluticasone (FLONASE) 50 MCG/ACT nasal spray Administer 1 Spray into affected nostril(S) every day. 16 g 11     No current [facility]-ordered facility-administered medications on file.     ROS:  Gen: no fevers/chills, no changes in weight  Eyes: no changes in vision  ENT: no sore throat  Pulm: no sob, no cough  CV: no chest pain, no palpitations  GI: no nausea/vomiting, no diarrhea  Skin: no rash  Neuro: no persistent/frequent headaches  Heme/Lymph: no easy bruising    Objective:     Exam:  /62 (BP Location: Right arm, Patient Position: Sitting, BP Cuff Size: Adult)   Pulse 85   Temp 36.7 °C (98.1 °F) (Temporal)   Resp 20   Ht 1.6 m (5' 3\")   Wt 55.3 kg (122 lb)   SpO2 95%   BMI 21.61 kg/m²  Body mass index is 21.61 kg/m².    Physical Exam:  Constitutional: Well-developed and well-nourished. No acute distress.   Skin: Skin is warm and dry. No rash noted.  Healing biopsy site lower leg.  Head: " Atraumatic without lesions.  Eyes: Conjunctivae and extraocular motions are normal. Pupils are equal, round. No scleral icterus.   Mouth/Throat: Wearing mask.  Neck: Supple, trachea midline.  Cardiovascular: Regular rate and rhythm, S1 and S2 without murmur, rubs, or gallops.  Lungs: Normal inspiratory effort, CTA bilaterally, no wheezes/rhonchi/rales  Extremities: No cyanosis, clubbing, erythema, nor edema.  Neurological: Alert and oriented x 3. No gross/focal deficits.  Psychiatric:  Behavior, mood, and affect are appropriate.    Labs: Reviewed from 8/23/2022  Imaging: Reviewed from DEXA 11/17/2022    Assessment & Plan:     72 y.o. female with the following -     1. Osteopenia of left hip  This is a chronic condition, worsened from prior and borderline elevated fracture risk in the hip at 2.9%.  Discussed fall prevention, adequate calcium and vitamin D.  Encouraged exercise.  Previously vitamin D was normal so no changes recommended, will trend with next labs.  Repeat DEXA in 2 years.  - VITAMIN D,25 HYDROXY (DEFICIENCY); Future    2. Migraine without aura and without status migrainosus, not intractable  This is a chronic condition, fortunately not frequent and Imitrex still working without adverse side effects.  Continue as needed as below.  - SUMAtriptan (IMITREX) 50 MG Tab; Take 1 Tablet by mouth one time as needed for Migraine for up to 1 dose.  Dispense: 9 Tablet; Refill: 3    3. Hyperlipidemia, unspecified hyperlipidemia type  This is a chronic condition, well controlled.  Annual labs to be repeated in August.  Continue atorvastatin 20 mg daily.  - Comp Metabolic Panel; Future  - TSH WITH REFLEX TO FT4; Future  - Lipid Profile; Future  - CBC WITH DIFFERENTIAL; Future    Return in about 37 weeks (around 8/16/2023) for Annual.    Please note that this dictation was created using voice recognition software. I have made every reasonable attempt to correct obvious errors, but I expect that there are errors of  grammar and possibly content that I did not discover before finalizing the note.

## 2022-11-30 NOTE — PATIENT INSTRUCTIONS
600mg calcium twice day (total 1200mg if not from diet)    Fasting labs prior to follow up (1-2 weeks)

## 2023-03-21 ENCOUNTER — HOSPITAL ENCOUNTER (OUTPATIENT)
Dept: RADIOLOGY | Facility: MEDICAL CENTER | Age: 73
End: 2023-03-21
Attending: STUDENT IN AN ORGANIZED HEALTH CARE EDUCATION/TRAINING PROGRAM
Payer: MEDICARE

## 2023-03-21 DIAGNOSIS — Z12.31 VISIT FOR SCREENING MAMMOGRAM: ICD-10-CM

## 2023-03-21 PROCEDURE — 77063 BREAST TOMOSYNTHESIS BI: CPT

## 2023-03-27 PROBLEM — I73.9 PERIPHERAL ARTERIAL DISEASE (HCC): Status: ACTIVE | Noted: 2023-03-27

## 2023-04-14 ENCOUNTER — HOSPITAL ENCOUNTER (OUTPATIENT)
Dept: LAB | Facility: MEDICAL CENTER | Age: 73
End: 2023-04-14
Attending: STUDENT IN AN ORGANIZED HEALTH CARE EDUCATION/TRAINING PROGRAM
Payer: MEDICARE

## 2023-04-14 DIAGNOSIS — E78.5 HYPERLIPIDEMIA, UNSPECIFIED HYPERLIPIDEMIA TYPE: ICD-10-CM

## 2023-04-14 DIAGNOSIS — M85.852 OSTEOPENIA OF LEFT HIP: ICD-10-CM

## 2023-04-14 LAB
ALBUMIN SERPL BCP-MCNC: 4.1 G/DL (ref 3.2–4.9)
ALBUMIN/GLOB SERPL: 1.6 G/DL
ALP SERPL-CCNC: 82 U/L (ref 30–99)
ALT SERPL-CCNC: 22 U/L (ref 2–50)
ANION GAP SERPL CALC-SCNC: 12 MMOL/L (ref 7–16)
AST SERPL-CCNC: 25 U/L (ref 12–45)
BASOPHILS # BLD AUTO: 0.5 % (ref 0–1.8)
BASOPHILS # BLD: 0.04 K/UL (ref 0–0.12)
BILIRUB SERPL-MCNC: 0.6 MG/DL (ref 0.1–1.5)
BUN SERPL-MCNC: 11 MG/DL (ref 8–22)
CALCIUM ALBUM COR SERPL-MCNC: 9.1 MG/DL (ref 8.5–10.5)
CALCIUM SERPL-MCNC: 9.2 MG/DL (ref 8.5–10.5)
CHLORIDE SERPL-SCNC: 103 MMOL/L (ref 96–112)
CHOLEST SERPL-MCNC: 193 MG/DL (ref 100–199)
CO2 SERPL-SCNC: 25 MMOL/L (ref 20–33)
CREAT SERPL-MCNC: 0.8 MG/DL (ref 0.5–1.4)
EOSINOPHIL # BLD AUTO: 0.1 K/UL (ref 0–0.51)
EOSINOPHIL NFR BLD: 1.3 % (ref 0–6.9)
ERYTHROCYTE [DISTWIDTH] IN BLOOD BY AUTOMATED COUNT: 41.4 FL (ref 35.9–50)
GFR SERPLBLD CREATININE-BSD FMLA CKD-EPI: 77 ML/MIN/1.73 M 2
GLOBULIN SER CALC-MCNC: 2.6 G/DL (ref 1.9–3.5)
GLUCOSE SERPL-MCNC: 74 MG/DL (ref 65–99)
HCT VFR BLD AUTO: 39.8 % (ref 37–47)
HDLC SERPL-MCNC: 103 MG/DL
HGB BLD-MCNC: 13.5 G/DL (ref 12–16)
IMM GRANULOCYTES # BLD AUTO: 0.03 K/UL (ref 0–0.11)
IMM GRANULOCYTES NFR BLD AUTO: 0.4 % (ref 0–0.9)
LDLC SERPL CALC-MCNC: 82 MG/DL
LYMPHOCYTES # BLD AUTO: 1.85 K/UL (ref 1–4.8)
LYMPHOCYTES NFR BLD: 24.2 % (ref 22–41)
MCH RBC QN AUTO: 31.1 PG (ref 27–33)
MCHC RBC AUTO-ENTMCNC: 33.9 G/DL (ref 33.6–35)
MCV RBC AUTO: 91.7 FL (ref 81.4–97.8)
MONOCYTES # BLD AUTO: 0.36 K/UL (ref 0–0.85)
MONOCYTES NFR BLD AUTO: 4.7 % (ref 0–13.4)
NEUTROPHILS # BLD AUTO: 5.27 K/UL (ref 2–7.15)
NEUTROPHILS NFR BLD: 68.9 % (ref 44–72)
NRBC # BLD AUTO: 0 K/UL
NRBC BLD-RTO: 0 /100 WBC
PLATELET # BLD AUTO: 222 K/UL (ref 164–446)
PMV BLD AUTO: 9.5 FL (ref 9–12.9)
POTASSIUM SERPL-SCNC: 4.1 MMOL/L (ref 3.6–5.5)
PROT SERPL-MCNC: 6.7 G/DL (ref 6–8.2)
RBC # BLD AUTO: 4.34 M/UL (ref 4.2–5.4)
SODIUM SERPL-SCNC: 140 MMOL/L (ref 135–145)
TRIGL SERPL-MCNC: 42 MG/DL (ref 0–149)
WBC # BLD AUTO: 7.7 K/UL (ref 4.8–10.8)

## 2023-04-14 PROCEDURE — 85025 COMPLETE CBC W/AUTO DIFF WBC: CPT

## 2023-04-14 PROCEDURE — 84439 ASSAY OF FREE THYROXINE: CPT

## 2023-04-14 PROCEDURE — 80053 COMPREHEN METABOLIC PANEL: CPT

## 2023-04-14 PROCEDURE — 84443 ASSAY THYROID STIM HORMONE: CPT

## 2023-04-14 PROCEDURE — 80061 LIPID PANEL: CPT

## 2023-04-14 PROCEDURE — 36415 COLL VENOUS BLD VENIPUNCTURE: CPT

## 2023-04-14 PROCEDURE — 82306 VITAMIN D 25 HYDROXY: CPT

## 2023-04-15 LAB
25(OH)D3 SERPL-MCNC: 63 NG/ML (ref 30–100)
T4 FREE SERPL-MCNC: 1.96 NG/DL (ref 0.93–1.7)
TSH SERPL DL<=0.005 MIU/L-ACNC: 0.24 UIU/ML (ref 0.38–5.33)

## 2023-04-20 ENCOUNTER — HOSPITAL ENCOUNTER (OUTPATIENT)
Dept: LAB | Facility: MEDICAL CENTER | Age: 73
End: 2023-04-20
Attending: STUDENT IN AN ORGANIZED HEALTH CARE EDUCATION/TRAINING PROGRAM
Payer: MEDICARE

## 2023-04-20 ENCOUNTER — OFFICE VISIT (OUTPATIENT)
Dept: MEDICAL GROUP | Facility: PHYSICIAN GROUP | Age: 73
End: 2023-04-20
Payer: MEDICARE

## 2023-04-20 VITALS
DIASTOLIC BLOOD PRESSURE: 72 MMHG | SYSTOLIC BLOOD PRESSURE: 130 MMHG | WEIGHT: 120 LBS | HEIGHT: 63 IN | BODY MASS INDEX: 21.26 KG/M2 | HEART RATE: 77 BPM | RESPIRATION RATE: 20 BRPM | OXYGEN SATURATION: 96 % | TEMPERATURE: 98.2 F

## 2023-04-20 DIAGNOSIS — H00.14 CHALAZION OF LEFT UPPER EYELID: ICD-10-CM

## 2023-04-20 DIAGNOSIS — Z00.01 ENCOUNTER FOR ANNUAL GENERAL MEDICAL EXAMINATION WITH ABNORMAL FINDINGS IN ADULT: ICD-10-CM

## 2023-04-20 DIAGNOSIS — H91.90 SUBJECTIVE HEARING LOSS: ICD-10-CM

## 2023-04-20 DIAGNOSIS — E89.0 POSTOPERATIVE HYPOTHYROIDISM: ICD-10-CM

## 2023-04-20 DIAGNOSIS — H61.22 EXCESSIVE CERUMEN IN LEFT EAR CANAL: ICD-10-CM

## 2023-04-20 DIAGNOSIS — H93.11 TINNITUS OF RIGHT EAR: ICD-10-CM

## 2023-04-20 PROBLEM — H93.13 TINNITUS OF BOTH EARS: Status: ACTIVE | Noted: 2023-04-20

## 2023-04-20 LAB
T4 FREE SERPL-MCNC: 1.79 NG/DL (ref 0.93–1.7)
TSH SERPL DL<=0.005 MIU/L-ACNC: 0.31 UIU/ML (ref 0.38–5.33)

## 2023-04-20 PROCEDURE — 36415 COLL VENOUS BLD VENIPUNCTURE: CPT

## 2023-04-20 PROCEDURE — 84439 ASSAY OF FREE THYROXINE: CPT

## 2023-04-20 PROCEDURE — 99214 OFFICE O/P EST MOD 30 MIN: CPT | Mod: 25 | Performed by: STUDENT IN AN ORGANIZED HEALTH CARE EDUCATION/TRAINING PROGRAM

## 2023-04-20 PROCEDURE — 84443 ASSAY THYROID STIM HORMONE: CPT

## 2023-04-20 PROCEDURE — G0439 PPPS, SUBSEQ VISIT: HCPCS | Performed by: STUDENT IN AN ORGANIZED HEALTH CARE EDUCATION/TRAINING PROGRAM

## 2023-04-20 RX ORDER — CIPROFLOXACIN HYDROCHLORIDE 3.5 MG/ML
1-2 SOLUTION/ DROPS TOPICAL EVERY 4 HOURS
COMMUNITY
Start: 2023-04-10 | End: 2023-10-18

## 2023-04-20 ASSESSMENT — FIBROSIS 4 INDEX: FIB4 SCORE: 1.776672636296753619

## 2023-04-20 ASSESSMENT — ACTIVITIES OF DAILY LIVING (ADL): BATHING_REQUIRES_ASSISTANCE: 0

## 2023-04-20 ASSESSMENT — ENCOUNTER SYMPTOMS: GENERAL WELL-BEING: GOOD

## 2023-04-20 ASSESSMENT — PATIENT HEALTH QUESTIONNAIRE - PHQ9: CLINICAL INTERPRETATION OF PHQ2 SCORE: 0

## 2023-04-20 NOTE — PATIENT INSTRUCTIONS
Consider lid hygiene 1-2 a day with baby shampoo or lid hygiene products    Repeat thyroid function testing today.  If normal no changes.  If still looks to be overtreated we will reduce her dose to 50 mcg daily and repeat her labs after 4 to 6 weeks on that dose.

## 2023-04-20 NOTE — PROGRESS NOTES
Chief Complaint   Patient presents with    Annual Exam    Eye Problem     OS     HPI:  Radha Land is a 73 y.o. here for Medicare Annual Wellness Visit.    Chalazion  Patient reports that while she was visiting in [place] she developed eye swelling.  States that the lower lid swelling has resolved.  Has not had any discharge or erythema.  She had a TeleDoc visit and was prescribed ciprofloxacin 0.3% Q4hr that she will finish today.has been doing warm compress which seems to help but the bump on the upper lid has not resolved.  She did see her eye doctor who recommended continuing warm compresses.  Wears contacts but has not used yet.  Denies eye pain or visual disturbances.    Patient Active Problem List    Diagnosis Date Noted    Chalazion of left upper eyelid 04/20/2023    Tinnitus of right ear 04/20/2023    Subjective hearing loss 04/20/2023    Peripheral arterial disease (HCC) 03/27/2023    History of squamous cell carcinoma of skin 11/30/2022    Chronic pain of left knee 08/24/2022    Aortic atherosclerosis (HCC) 04/20/2022    Hyperlipidemia 02/04/2022    History of vitamin D deficiency 02/04/2022    Primary insomnia 02/04/2022    Chronic right shoulder pain 02/04/2022    Age-related cataract of both eyes 02/04/2022    Nocturnal leg cramps 02/04/2022    Postoperative pain 11/08/2021    BMI 21.0-21.9, adult 06/23/2021    Primary osteoarthritis of left knee 01/24/2018    Recurrent major depressive disorder, in full remission (HCC) 11/14/2016    Migraine without aura and without status migrainosus, not intractable 06/01/2015    Osteopenia of left hip 06/01/2015    Postoperative hypothyroidism 09/02/2014    History of Sweet syndrome 09/02/2014     Current Outpatient Medications   Medication Sig Dispense Refill    levothyroxine (SYNTHROID) 50 MCG Tab Take 1 Tablet by mouth every morning on an empty stomach. 90 Tablet 3    ciprofloxacin (CILOXIN) 0.3 % Solution Administer 1-2 Drops into affected eye(s) every 4  hours. Left eye      SUMAtriptan (IMITREX) 50 MG Tab Take 1 Tablet by mouth one time as needed for Migraine for up to 1 dose. 9 Tablet 3    atorvastatin (LIPITOR) 20 MG Tab Take 1 tablet by mouth every evening 100 Tablet 3    Ascorbic Acid (VITAMIN C PO) Take  by mouth.      VITAMIN D PO Take 2,000 Int'l Units/day by mouth every day.      coenzyme Q-10 30 MG capsule Take 60 mg by mouth every day.      CALCIUM-VITAMIN D PO Take  by mouth.      Cyanocobalamin (VITAMIN B12 PO) Take  by mouth.      MAGNESIUM PO Take  by mouth.      POTASSIUM PO Take  by mouth.      ibuprofen (MOTRIN) 200 MG Tab Take 200 mg by mouth 1 time a day as needed.      Psyllium (METAMUCIL PO) Take  by mouth every day.      fluticasone (FLONASE) 50 MCG/ACT nasal spray Administer 1 Spray into affected nostril(S) every day. 16 g 11     No current facility-administered medications for this visit.          Current supplements as per medication list.     Allergies: Patient has no known allergies.    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 Goes to [employer]. Spends time with friends. Goes to [place]. Participates in [employer].      She  reports that she quit smoking about 38 years ago. She has a 8.00 pack-year smoking history. She has never used smokeless tobacco. She reports current alcohol use. She reports that she does not use drugs.  Counseling given: Not Answered    ROS:    Gait: Uses no assistive device  Ostomy: No  Other tubes: No  Amputations: No  Chronic oxygen use: No  Last eye exam: Jan 2023   Wears hearing aids: No   : Denies any urinary leakage during the last 6 months    Screening:    Depression Screening  Little interest or pleasure in doing things?  0 - not at all  Feeling down, depressed , or hopeless? 0 - not at all  Trouble falling or staying asleep, or sleeping too much?     Feeling tired or having little energy?     Poor appetite or overeating?      Feeling bad about yourself - or that you are a failure or have let yourself or your family down?    Trouble concentrating on things, such as reading the newspaper or watching television?    Moving or speaking so slowly that other people could have noticed.  Or the opposite - being so fidgety or restless that you have been moving around a lot more than usual?     Thoughts that you would be better off dead, or of hurting yourself?     Patient Health Questionnaire Score:      If depressive symptoms identified deferred to follow up visit unless specifically addressed in assessment and plan.    Interpretation of PHQ-9 Total Score   Score Severity   1-4 No Depression   5-9 Mild Depression   10-14 Moderate Depression   15-19 Moderately Severe Depression   20-27 Severe Depression    Screening for Cognitive Impairment  Three Minute Recall (Banana, Sunrise, Chair) 3/3    Cristi clock face with all 12 numbers and set the hands to show 20 past 8.  Yes    Cognitive concerns identified deferred for follow up unless specifically addressed in assessment and plan.    Fall Risk Assessment  Has the patient had two or more falls in the last year or any fall with injury in the last year?  No    Safety Assessment  Throw rugs on floor.  Yes  Handrails on all stairs.  Yes  Good lighting in all hallways.  Yes  Difficulty hearing.  Yes. Chronic high pitched right tinnitus-may be both, pt unsure. Hearing is better on right.  Patient counseled about all safety risks that were identified.    Functional Assessment ADLs  Are there any barriers preventing you from cooking for yourself or meeting nutritional needs?  No.    Are there any barriers preventing you from driving safely or obtaining transportation?  No.    Are there any barriers preventing you from using a telephone or calling for help?  No    Are there any barriers preventing you from shopping?  No.    Are there any barriers preventing you from taking care of your own finances?  No     Are there any barriers preventing you from managing your medications?  No    Are there any barriers preventing you from showering, bathing or dressing yourself? No    Are you currently engaging in any exercise or physical activity?  Yes.    What is your perception of your health? Good    Advance Care Planning  Do you have an Advance Directive, Living Will, Durable Power of , or POLST? Yes  Advance Directive Living Will Durable Power of  POLST      Health Maintenance Summary            MAMMOGRAM (Yearly) Next due on 3/21/2024      03/21/2023  MA-SCREENING MAMMO BILAT W/TOMOSYNTHESIS W/CAD    03/14/2022  MA-SCREENING MAMMO BILAT W/TOMOSYNTHESIS W/CAD    02/23/2021  MA-SCREENING MAMMO BILAT W/TOMOSYNTHESIS W/CAD    02/04/2020  MA-SCREENING MAMMO BILAT W/TOMOSYNTHESIS W/CAD    01/28/2019  MA-SCREENING MAMMO BILAT W/TOMOSYNTHESIS W/CAD    Only the first 5 history entries have been loaded, but more history exists.              Annual Wellness Visit (Every 366 Days) Next due on 3/27/2024      03/27/2023  Level of Service: ANNUAL WELLNESS VISIT-INCLUDES PPPS SUBSEQUE*    04/20/2022  Level of Service: ND ANNUAL WELLNESS VISIT-INCLUDES PPPS SUBSEQUE*    06/22/2021  Level of Service: ND ANNUAL WELLNESS VISIT-INCLUDES PPPS SUBSEQUE*    02/02/2021  Level of Service: ND ANNUAL WELLNESS VISIT-INCLUDES PPPS SUBSEQUE*    02/02/2021  Visit Dx: Medicare annual wellness visit, subsequent    Only the first 5 history entries have been loaded, but more history exists.              IMM DTaP/Tdap/Td Vaccine (2 - Td or Tdap) Next due on 9/2/2024 09/02/2014  Imm Admin: Tdap Vaccine              BONE DENSITY (Every 2 Years) Next due on 11/17/2024 11/17/2022  DS-BONE DENSITY STUDY (DEXA)    11/16/2020  DS-BONE DENSITY STUDY (DEXA)    11/11/2015  DS-BONE DENSITY STUDY (DEXA)    07/14/2010  DS-BONE DENSITY STUDY (DEXA)              COLORECTAL CANCER SCREENING (COLONOSCOPY - Preferred) Next due on 6/23/2027       06/23/2017  REFERRAL TO GI FOR COLONOSCOPY    09/08/2014  OCCULT BLOOD FECES IMMUNOASSAY    11/15/2007  AMB REFERRAL TO GI FOR COLONOSCOPY              IMM PNEUMOCOCCAL VACCINE: 65+ Years (Series Information) Completed      03/02/2017  Imm Admin: Pneumococcal Conjugate Vaccine (Prevnar/PCV-13)    09/02/2014  Imm Admin: Pneumococcal polysaccharide vaccine (PPSV-23)              HEPATITIS C SCREENING  Completed      06/02/2017  HEP C VIRUS ANTIBODY    07/29/2013  HEP C VIRUS ANTIBODY              IMM ZOSTER VACCINES (Series Information) Completed      12/11/2018  Imm Admin: Zoster Vaccine Recombinant (RZV) (SHINGRIX)    09/25/2018  Imm Admin: Zoster Vaccine Recombinant (RZV) (SHINGRIX)    05/11/2012  Imm Admin: Zoster Vaccine Live (ZVL) (Zostavax) - HISTORICAL DATA              COVID-19 Vaccine (Series Information) Completed      09/17/2022  Imm Admin: PFIZER BIVALENT BOOSTER SARS-COV-2 VACCINE (12+)    04/14/2022  Imm Admin: PFIZER DAUGHERTY CAP SARS-COV-2 VACCINATION (12+)    10/08/2021  Imm Admin: PFIZER PURPLE CAP SARS-COV-2 VACCINATION (12+)    02/26/2021  Imm Admin: PFIZER PURPLE CAP SARS-COV-2 VACCINATION (12+)    02/05/2021  Imm Admin: PFIZER PURPLE CAP SARS-COV-2 VACCINATION (12+)              IMM INFLUENZA (Series Information) Completed      10/24/2022  Imm Admin: Influenza, Unspecified - HISTORICAL DATA    10/25/2021  Imm Admin: Influenza, Unspecified - HISTORICAL DATA    10/24/2020  Imm Admin: Influenza Vaccine Adult HD    11/13/2019  Imm Admin: Influenza Vaccine Adult HD    11/13/2019  Imm Admin: Influenza Vaccine Adult HD    Only the first 5 history entries have been loaded, but more history exists.              IMM MENINGOCOCCAL ACWY VACCINE (Series Information) Aged Out      No completion history exists for this topic.              Discontinued - CERVICAL CANCER SCREENING  Discontinued        Frequency changed to Never automatically (Topic No Longer Applies)    12/20/2016  THINPREP PAP WITH HPV     2016  PATHOLOGY GYN SPECIMEN              Discontinued - IMM HEP B VACCINE  Discontinued      No completion history exists for this topic.                  Patient Care Team:  Jo Cardona D.O. as PCP - General (Family Medicine)  Jo Cardona D.O. as PCP - [facility] (Family Medicine)  Johnny You M.D. as Consulting Physician (Gastroenterology)  SANDOVAL Morales as Mid Level Provider (Dermatology)  Cristal Allen (Inactive)    Social History     Tobacco Use    Smoking status: Former     Packs/day: 0.50     Years: 16.00     Pack years: 8.00     Types: Cigarettes     Quit date: 1984     Years since quittin.8    Smokeless tobacco: Never   Vaping Use    Vaping Use: Never used   Substance Use Topics    Alcohol use: Yes     Comment: 1 glass of beer/wine a day    Drug use: No     Family History   Problem Relation Age of Onset    Hyperlipidemia Mother     Thyroid Mother         Hypothyroid    Cancer Mother 90        uterine cancer    Diabetes Father     Heart Failure Father     Heart Disease Father         Rhumatic vavular heart disease    Other Father         rheumatic fever    Diabetes Sister     Cancer Other         breast    Diabetes Brother     Cancer Maternal Grandmother         uterine     She  has a past medical history of Disorder of arteries and arterioles (HCC) (2021), Hyperlipidemia, Impingement syndrome of right shoulder (10/28/2021), Migraine, SLAP lesion of right shoulder (10/28/2021), Strain of tendon of right rotator cuff (10/28/2021), Thyroid disease, and Vitamin D insufficiency (2017).   Past Surgical History:   Procedure Laterality Date    PB SHLDR ARTHROSCOP,SURG,W/ROTAT CUFF REPB Right 2021    Procedure: RIGHT SHOULDER ARTHROSCOPY DEBRIDEMENT, RIGHT ROTATOR CUFF REPAIR, RIGHT SUBACROMIAL DECOMPRESSION, RIGHT DISTAL CLAVICLE EXCISION, REPAIRS AS INDICATED;  Surgeon: Vish Garcia M.D.;  Location: [facility];  Service: Orthopedics  "   HEMORRHOIDECTOMY  9/12/2017    Procedure: HEMORRHOIDECTOMY- ARTERY LIGATION, RECTAL ANO REPAIR;  Surgeon: Aleksandr Quinteros M.D.;  Location: SURGERY [facility];  Service: General    ABDOMINAL HYSTERECTOMY TOTAL      Fibroids // 1990    OTHER      hemrroidectomy    OTHER ORTHOPEDIC SURGERY      surgery to big toe    THYROIDECTOMY      partial       Exam:   /72 (BP Location: Left arm, Patient Position: Sitting, BP Cuff Size: Adult)   Pulse 77   Temp 36.8 °C (98.2 °F) (Temporal)   Resp 20   Ht 1.6 m (5' 3\")   Wt 54.4 kg (120 lb)   SpO2 96%  Body mass index is 21.26 kg/m².    Constitutional: Well-developed and well-nourished. No acute distress.   Skin: Skin is warm and dry. No rash noted.  Head: Atraumatic without lesions.  Eyes: Conjunctivae and extraocular motions are normal. Non tender nodule without skin changes on superior eyelid OS. Pupils are equal, round, and reactive to light. No scleral icterus.   Ears:  External ears unremarkable. Tympanic membranes clear and intact. Cerumen in left ear.  Nose: Nares patent. No discharge.  Mouth/Throat: Oropharynx is moist.   Neck: Supple, trachea midline. Normal range of motion.   No lymphadenopathy--cervical or supraclavicular.  Cardiovascular: Regular rate and rhythm, S1 and S2 without murmur, rubs, or gallops.  Lungs: Normal inspiratory effort, CTA bilaterally, no wheezes/rhonchi/rales  Abdomen: Soft, non tender, and without distention. Active bowel sounds. No rebound, guarding, masses or HSM.  Extremities: No cyanosis, clubbing, erythema, nor edema.  Neurological: Alert and oriented x 3. No gross/focal deficits.  Psychiatric:  Behavior, mood, and affect are appropriate.    Labs: 4/14/2023    Assessment and Plan. The following treatment and monitoring plan is recommended:      1. Encounter for annual general medical examination with abnormal findings in adult  Services suggested: No services needed at this time  Health Care Screening: Age-appropriate " preventive services recommended by USPTF and ACIP covered by Medicare were discussed today. Services ordered if indicated and agreed upon by the patient.  Referrals offered: Community-based lifestyle interventions to reduce health risks and promote self-management and wellness, fall prevention, nutrition, physical activity, tobacco-use cessation, weight loss, and mental health services as per orders if indicated.    Discussion today about anticipatory guidance, general wellness and lifestyle habits:    Prevent falls and reduce trip hazards; Cautioned about securing or removing rugs.  Have a working fire alarm and carbon monoxide detector;   Engage in regular physical activity and social activities     2. Postoperative hypothyroidism  Chronic, appears overtreated.  Patient preferred to repeat thyroid studies today and appears to still be overtreated so plan on reducing levothyroxine to 50 mcg as below and trend in 4 to 6 weeks.  - TSH WITH REFLEX TO FT4; Future  - levothyroxine (SYNTHROID) 50 MCG Tab; Take 1 Tablet by mouth every morning on an empty stomach.  Dispense: 90 Tablet; Refill: 3  - TSH WITH REFLEX TO FT4; Future    3. Chalazion of left upper eyelid  This is an acute condition, patient to complete her course of ciprofloxacin.  Agree with warm compresses and discussed consideration of lid hygiene as well. Gave return precautions.    4. Tinnitus of right ear  5. Subjective hearing loss  Chronic conditions, patient declines referral for audiology at this time.  Discussed hearing conservation.    6. Excessive cerumen in left ear canal  Resolved with irrigation in clinic.    Follow-up: Return in about 6 months (around 10/20/2023), or if symptoms worsen or fail to improve.

## 2023-04-22 RX ORDER — LEVOTHYROXINE SODIUM 0.05 MG/1
50 TABLET ORAL
Qty: 90 TABLET | Refills: 3 | Status: SHIPPED | OUTPATIENT
Start: 2023-04-22 | End: 2024-01-12

## 2023-06-09 ENCOUNTER — HOSPITAL ENCOUNTER (OUTPATIENT)
Dept: LAB | Facility: MEDICAL CENTER | Age: 73
End: 2023-06-09
Attending: STUDENT IN AN ORGANIZED HEALTH CARE EDUCATION/TRAINING PROGRAM
Payer: MEDICARE

## 2023-06-09 DIAGNOSIS — E89.0 POSTOPERATIVE HYPOTHYROIDISM: ICD-10-CM

## 2023-06-09 LAB — TSH SERPL DL<=0.005 MIU/L-ACNC: 4.92 UIU/ML (ref 0.38–5.33)

## 2023-06-09 PROCEDURE — 36415 COLL VENOUS BLD VENIPUNCTURE: CPT

## 2023-06-09 PROCEDURE — 84443 ASSAY THYROID STIM HORMONE: CPT

## 2023-07-21 ENCOUNTER — PATIENT MESSAGE (OUTPATIENT)
Dept: MEDICAL GROUP | Facility: PHYSICIAN GROUP | Age: 73
End: 2023-07-21
Payer: MEDICARE

## 2023-07-21 DIAGNOSIS — E78.00 PURE HYPERCHOLESTEROLEMIA: ICD-10-CM

## 2023-07-22 ENCOUNTER — PATIENT MESSAGE (OUTPATIENT)
Dept: MEDICAL GROUP | Facility: PHYSICIAN GROUP | Age: 73
End: 2023-07-22
Payer: MEDICARE

## 2023-07-22 DIAGNOSIS — G43.009 MIGRAINE WITHOUT AURA AND WITHOUT STATUS MIGRAINOSUS, NOT INTRACTABLE: ICD-10-CM

## 2023-07-22 RX ORDER — ATORVASTATIN CALCIUM 20 MG/1
TABLET, FILM COATED ORAL
Qty: 100 TABLET | Refills: 3 | Status: SHIPPED | OUTPATIENT
Start: 2023-07-22 | End: 2023-10-13 | Stop reason: SDUPTHER

## 2023-07-26 RX ORDER — SUMATRIPTAN 50 MG/1
50 TABLET, FILM COATED ORAL
Qty: 9 TABLET | Refills: 3 | Status: SHIPPED | OUTPATIENT
Start: 2023-07-26 | End: 2024-03-19 | Stop reason: SDUPTHER

## 2023-08-31 ENCOUNTER — NURSE TRIAGE (OUTPATIENT)
Dept: HEALTH INFORMATION MANAGEMENT | Facility: OTHER | Age: 73
End: 2023-08-31
Payer: MEDICARE

## 2023-10-13 DIAGNOSIS — E78.00 PURE HYPERCHOLESTEROLEMIA: ICD-10-CM

## 2023-10-13 RX ORDER — ATORVASTATIN CALCIUM 20 MG/1
TABLET, FILM COATED ORAL
Qty: 100 TABLET | Refills: 2 | Status: SHIPPED | OUTPATIENT
Start: 2023-10-13 | End: 2023-10-18 | Stop reason: SDUPTHER

## 2023-10-18 ENCOUNTER — OFFICE VISIT (OUTPATIENT)
Dept: MEDICAL GROUP | Facility: LAB | Age: 73
End: 2023-10-18
Payer: MEDICARE

## 2023-10-18 VITALS
HEIGHT: 63 IN | OXYGEN SATURATION: 95 % | HEART RATE: 68 BPM | BODY MASS INDEX: 21.97 KG/M2 | SYSTOLIC BLOOD PRESSURE: 120 MMHG | RESPIRATION RATE: 16 BRPM | TEMPERATURE: 97.2 F | WEIGHT: 124 LBS | DIASTOLIC BLOOD PRESSURE: 70 MMHG

## 2023-10-18 DIAGNOSIS — M85.852 OSTEOPENIA OF LEFT HIP: ICD-10-CM

## 2023-10-18 DIAGNOSIS — E78.00 PURE HYPERCHOLESTEROLEMIA: ICD-10-CM

## 2023-10-18 DIAGNOSIS — I70.0 AORTIC ATHEROSCLEROSIS (HCC): ICD-10-CM

## 2023-10-18 DIAGNOSIS — E89.0 POSTOPERATIVE HYPOTHYROIDISM: ICD-10-CM

## 2023-10-18 PROBLEM — I73.9 PERIPHERAL ARTERIAL DISEASE (HCC): Status: RESOLVED | Noted: 2023-03-27 | Resolved: 2023-10-18

## 2023-10-18 PROBLEM — H00.14 CHALAZION OF LEFT UPPER EYELID: Status: RESOLVED | Noted: 2023-04-20 | Resolved: 2023-10-18

## 2023-10-18 PROCEDURE — 3078F DIAST BP <80 MM HG: CPT | Performed by: STUDENT IN AN ORGANIZED HEALTH CARE EDUCATION/TRAINING PROGRAM

## 2023-10-18 PROCEDURE — 99214 OFFICE O/P EST MOD 30 MIN: CPT | Performed by: STUDENT IN AN ORGANIZED HEALTH CARE EDUCATION/TRAINING PROGRAM

## 2023-10-18 PROCEDURE — 3074F SYST BP LT 130 MM HG: CPT | Performed by: STUDENT IN AN ORGANIZED HEALTH CARE EDUCATION/TRAINING PROGRAM

## 2023-10-18 RX ORDER — ATORVASTATIN CALCIUM 20 MG/1
TABLET, FILM COATED ORAL
Qty: 100 TABLET | Refills: 3 | Status: SHIPPED
Start: 2023-10-18 | End: 2023-10-18

## 2023-10-18 RX ORDER — ATORVASTATIN CALCIUM 20 MG/1
TABLET, FILM COATED ORAL
Qty: 100 TABLET | Refills: 3 | Status: SHIPPED | OUTPATIENT
Start: 2023-10-18 | End: 2024-02-28 | Stop reason: SDUPTHER

## 2023-10-18 ASSESSMENT — FIBROSIS 4 INDEX: FIB4 SCORE: 1.776672636296753619

## 2023-10-18 NOTE — PATIENT INSTRUCTIONS
fasting labs due 1-2 weeks prior to follow up     Aim for goal of 150 minutes a week of moderate intensity exercise (ex 30 minutes 5 times a week)    Vitamin D3 2000IU/25mcg daily  Calcium 1200mg/day (diet best, 600mg twice a day)

## 2023-10-18 NOTE — PROGRESS NOTES
"Subjective:     Chief Complaint   Patient presents with    Follow-Up    Medication Refill     Atorvastatin      HPI:   Radha presents today for follow-up and medication refill.    Patient requesting a refill of her atorvastatin which she is tolerating well.  Patient's without concerns today.  Had some issues in August with eyelid droop that she found to be related to seasonal allergies, eyelid droop resolved.  Has overall not had any headaches but due to recent travel and jet lag did need her Imitrex for migraine which worked well with resolution of her headache.  Denies today.    She had her annual Medicare visit with an abnormal Quanta flow but denies any cyanosis/pallor in her feet nor numbness/tingling or claudication.    Review of Systems   Constitutional:  Negative for chills, fever and weight loss.   Respiratory:  Negative for cough and shortness of breath.    Cardiovascular:  Negative for chest pain, palpitations, claudication and leg swelling.   Gastrointestinal:  Negative for abdominal pain, constipation, diarrhea, nausea and vomiting.   Musculoskeletal:  Negative for myalgias.     Objective:     Exam:  /70 (BP Location: Left arm, Patient Position: Sitting, BP Cuff Size: Adult)   Pulse 68   Temp 36.2 °C (97.2 °F) (Temporal)   Resp 16   Ht 1.6 m (5' 3\")   Wt 56.2 kg (124 lb)   SpO2 95%   BMI 21.97 kg/m²  Body mass index is 21.97 kg/m².    Physical Exam  Vitals reviewed.   Constitutional:       General: She is not in acute distress.     Appearance: Normal appearance. She is not ill-appearing.   HENT:      Head: Normocephalic and atraumatic.      Right Ear: Tympanic membrane, ear canal and external ear normal.      Left Ear: Tympanic membrane, ear canal and external ear normal.      Nose: Nose normal. No congestion or rhinorrhea.      Mouth/Throat:      Mouth: Mucous membranes are moist.      Pharynx: No oropharyngeal exudate or posterior oropharyngeal erythema.   Eyes:      General: No scleral " icterus.     Conjunctiva/sclera: Conjunctivae normal.   Cardiovascular:      Rate and Rhythm: Normal rate and regular rhythm.      Pulses:           Dorsalis pedis pulses are 2+ on the right side and 2+ on the left side.        Posterior tibial pulses are 2+ on the right side and 2+ on the left side.      Heart sounds: Normal heart sounds.   Pulmonary:      Effort: Pulmonary effort is normal. No respiratory distress.      Breath sounds: Normal breath sounds. No wheezing, rhonchi or rales.   Abdominal:      General: Abdomen is flat. Bowel sounds are normal. There is no distension.      Palpations: Abdomen is soft. There is no mass.      Tenderness: There is no abdominal tenderness. There is no guarding or rebound.   Musculoskeletal:      Cervical back: Neck supple. No rigidity or tenderness.      Right lower leg: No edema.      Left lower leg: No edema.   Lymphadenopathy:      Cervical: No cervical adenopathy.   Skin:     General: Skin is warm and dry.      Capillary Refill: Capillary refill takes less than 2 seconds.      Coloration: Skin is not jaundiced.   Neurological:      General: No focal deficit present.      Mental Status: She is alert and oriented to person, place, and time.      Sensory: No sensory deficit (normal sensation in feet).   Psychiatric:         Mood and Affect: Mood normal.         Behavior: Behavior normal.         Thought Content: Thought content normal.       Labs: Reviewed from 4/14/2023  Imaging: Reviewed from 11/17/2022 DEXA    Assessment & Plan:     73 y.o. female with the following -     1. Pure hypercholesterolemia  2. Aortic atherosclerosis (HCC)  Chronic, controlled. Continue medication(s) as below.  - atorvastatin (LIPITOR) 20 MG Tab; Take 1 tablet by mouth every evening  Dispense: 100 Tablet; Refill: 3  - Comp Metabolic Panel; Future  - Lipid Profile; Future  - CBC WITH DIFFERENTIAL; Future    3. Osteopenia of left hip  Chronic condition, FRAX score not at threshold for  bisphosphonate.  Will trend 2 years after last.  Check vitamin D with annual labs.  Patient aware of fall precautions and need for adequate calcium/vitamin D.  - VITAMIN D,25 HYDROXY (DEFICIENCY); Future    4. Postoperative hypothyroidism  Chronic, controlled. Continue medication(s) as below.  Trend TSH in April with annual labs to consolidate blood work.  - TSH WITH REFLEX TO FT4; Future    levothyroxine (SYNTHROID) 50 MCG Tab, Take 1 Tablet by mouth every morning on an empty stomach., Disp: 90 Tablet, Rfl: 3    Return in about 6 months (around 4/23/2024), or if symptoms worsen or fail to improve, for Annual.    Please note that this dictation was created using voice recognition software. I have made every reasonable attempt to correct obvious errors, but I expect that there are errors of grammar and possibly content that I did not discover before finalizing the note.

## 2023-10-20 ASSESSMENT — ENCOUNTER SYMPTOMS
PALPITATIONS: 0
DIARRHEA: 0
COUGH: 0
MYALGIAS: 0
WEIGHT LOSS: 0
CONSTIPATION: 0
NAUSEA: 0
ABDOMINAL PAIN: 0
FEVER: 0
CHILLS: 0
SHORTNESS OF BREATH: 0
VOMITING: 0
CLAUDICATION: 0

## 2024-01-12 DIAGNOSIS — E89.0 POSTOPERATIVE HYPOTHYROIDISM: ICD-10-CM

## 2024-01-12 RX ORDER — LEVOTHYROXINE SODIUM 0.05 MG/1
50 TABLET ORAL
Qty: 90 TABLET | Refills: 1 | Status: SHIPPED | OUTPATIENT
Start: 2024-01-12

## 2024-02-28 ENCOUNTER — PATIENT MESSAGE (OUTPATIENT)
Dept: MEDICAL GROUP | Facility: LAB | Age: 74
End: 2024-02-28
Payer: MEDICARE

## 2024-02-28 DIAGNOSIS — I70.0 AORTIC ATHEROSCLEROSIS (HCC): ICD-10-CM

## 2024-02-28 DIAGNOSIS — E78.00 PURE HYPERCHOLESTEROLEMIA: ICD-10-CM

## 2024-02-28 RX ORDER — ATORVASTATIN CALCIUM 20 MG/1
TABLET, FILM COATED ORAL
Qty: 100 TABLET | Refills: 3 | Status: SHIPPED
Start: 2024-02-28 | End: 2024-03-01 | Stop reason: SDUPTHER

## 2024-02-28 NOTE — PATIENT COMMUNICATION
Received request via: Patient    Was the patient seen in the last year in this department? Yes    Does the patient have an active prescription (recently filled or refills available) for medication(s) requested? No    [facility]     Does the patient have jail Plus and need 100 day supply (blood pressure, diabetes and cholesterol meds only)? Yes, quantity updated to 100 days

## 2024-03-01 RX ORDER — ATORVASTATIN CALCIUM 20 MG/1
TABLET, FILM COATED ORAL
Qty: 100 TABLET | Refills: 3 | Status: SHIPPED | OUTPATIENT
Start: 2024-03-01

## 2024-03-12 ENCOUNTER — HOSPITAL ENCOUNTER (OUTPATIENT)
Dept: LAB | Facility: MEDICAL CENTER | Age: 74
End: 2024-03-12
Attending: STUDENT IN AN ORGANIZED HEALTH CARE EDUCATION/TRAINING PROGRAM
Payer: MEDICARE

## 2024-03-12 DIAGNOSIS — E78.00 PURE HYPERCHOLESTEROLEMIA: ICD-10-CM

## 2024-03-12 DIAGNOSIS — M85.852 OSTEOPENIA OF LEFT HIP: ICD-10-CM

## 2024-03-12 DIAGNOSIS — E89.0 POSTOPERATIVE HYPOTHYROIDISM: ICD-10-CM

## 2024-03-12 LAB
25(OH)D3 SERPL-MCNC: 57 NG/ML (ref 30–100)
ALBUMIN SERPL BCP-MCNC: 4.2 G/DL (ref 3.2–4.9)
ALBUMIN/GLOB SERPL: 1.8 G/DL
ALP SERPL-CCNC: 67 U/L (ref 30–99)
ALT SERPL-CCNC: 22 U/L (ref 2–50)
ANION GAP SERPL CALC-SCNC: 9 MMOL/L (ref 7–16)
AST SERPL-CCNC: 28 U/L (ref 12–45)
BASOPHILS # BLD AUTO: 1 % (ref 0–1.8)
BASOPHILS # BLD: 0.05 K/UL (ref 0–0.12)
BILIRUB SERPL-MCNC: 0.4 MG/DL (ref 0.1–1.5)
BUN SERPL-MCNC: 15 MG/DL (ref 8–22)
CALCIUM ALBUM COR SERPL-MCNC: 8.9 MG/DL (ref 8.5–10.5)
CALCIUM SERPL-MCNC: 9.1 MG/DL (ref 8.5–10.5)
CHLORIDE SERPL-SCNC: 103 MMOL/L (ref 96–112)
CHOLEST SERPL-MCNC: 185 MG/DL (ref 100–199)
CO2 SERPL-SCNC: 26 MMOL/L (ref 20–33)
CREAT SERPL-MCNC: 0.81 MG/DL (ref 0.5–1.4)
EOSINOPHIL # BLD AUTO: 0.12 K/UL (ref 0–0.51)
EOSINOPHIL NFR BLD: 2.5 % (ref 0–6.9)
ERYTHROCYTE [DISTWIDTH] IN BLOOD BY AUTOMATED COUNT: 45.4 FL (ref 35.9–50)
FASTING STATUS PATIENT QL REPORTED: NORMAL
GFR SERPLBLD CREATININE-BSD FMLA CKD-EPI: 76 ML/MIN/1.73 M 2
GLOBULIN SER CALC-MCNC: 2.3 G/DL (ref 1.9–3.5)
GLUCOSE SERPL-MCNC: 86 MG/DL (ref 65–99)
HCT VFR BLD AUTO: 43.3 % (ref 37–47)
HDLC SERPL-MCNC: 92 MG/DL
HGB BLD-MCNC: 14 G/DL (ref 12–16)
IMM GRANULOCYTES # BLD AUTO: 0.01 K/UL (ref 0–0.11)
IMM GRANULOCYTES NFR BLD AUTO: 0.2 % (ref 0–0.9)
LDLC SERPL CALC-MCNC: 86 MG/DL
LYMPHOCYTES # BLD AUTO: 1.37 K/UL (ref 1–4.8)
LYMPHOCYTES NFR BLD: 28.4 % (ref 22–41)
MCH RBC QN AUTO: 31.6 PG (ref 27–33)
MCHC RBC AUTO-ENTMCNC: 32.3 G/DL (ref 32.2–35.5)
MCV RBC AUTO: 97.7 FL (ref 81.4–97.8)
MONOCYTES # BLD AUTO: 0.32 K/UL (ref 0–0.85)
MONOCYTES NFR BLD AUTO: 6.6 % (ref 0–13.4)
NEUTROPHILS # BLD AUTO: 2.95 K/UL (ref 1.82–7.42)
NEUTROPHILS NFR BLD: 61.3 % (ref 44–72)
NRBC # BLD AUTO: 0 K/UL
NRBC BLD-RTO: 0 /100 WBC (ref 0–0.2)
PLATELET # BLD AUTO: 246 K/UL (ref 164–446)
PMV BLD AUTO: 9.4 FL (ref 9–12.9)
POTASSIUM SERPL-SCNC: 5 MMOL/L (ref 3.6–5.5)
PROT SERPL-MCNC: 6.5 G/DL (ref 6–8.2)
RBC # BLD AUTO: 4.43 M/UL (ref 4.2–5.4)
SODIUM SERPL-SCNC: 138 MMOL/L (ref 135–145)
T4 FREE SERPL-MCNC: 1.34 NG/DL (ref 0.93–1.7)
TRIGL SERPL-MCNC: 36 MG/DL (ref 0–149)
TSH SERPL DL<=0.005 MIU/L-ACNC: 8.21 UIU/ML (ref 0.38–5.33)
WBC # BLD AUTO: 4.8 K/UL (ref 4.8–10.8)

## 2024-03-12 PROCEDURE — 84439 ASSAY OF FREE THYROXINE: CPT

## 2024-03-12 PROCEDURE — 85025 COMPLETE CBC W/AUTO DIFF WBC: CPT

## 2024-03-12 PROCEDURE — 84443 ASSAY THYROID STIM HORMONE: CPT

## 2024-03-12 PROCEDURE — 36415 COLL VENOUS BLD VENIPUNCTURE: CPT

## 2024-03-12 PROCEDURE — 82306 VITAMIN D 25 HYDROXY: CPT

## 2024-03-12 PROCEDURE — 80061 LIPID PANEL: CPT

## 2024-03-12 PROCEDURE — 80053 COMPREHEN METABOLIC PANEL: CPT

## 2024-03-13 PROBLEM — Z86.39 HISTORY OF VITAMIN D DEFICIENCY: Status: RESOLVED | Noted: 2022-02-04 | Resolved: 2024-03-13

## 2024-03-13 PROBLEM — G89.18 POSTOPERATIVE PAIN: Status: RESOLVED | Noted: 2021-11-08 | Resolved: 2024-03-13

## 2024-03-13 PROBLEM — Z85.828 HISTORY OF SQUAMOUS CELL CARCINOMA OF SKIN: Status: RESOLVED | Noted: 2022-11-30 | Resolved: 2024-03-13

## 2024-03-13 NOTE — ASSESSMENT & PLAN NOTE
Chronic, stable. Currently taking atorvastatin 20 mg daily. Reports that she walks, swims, and golfs regularly. Denies chest pain, claudication, and dizziness.

## 2024-03-13 NOTE — ASSESSMENT & PLAN NOTE
Chronic, stable. History of partial thyroidectomy. Currently taking levothyroxine 50 mcg daily as prescribed. Denies fatigue, palpitations, hair and skin changes, temperature intolerance, changes in bowel habits, and weight loss or weight gain.

## 2024-03-13 NOTE — ASSESSMENT & PLAN NOTE
Chronic, stable. Reports use of sumatriptan as needed for migraines. States that last migraine was in the 1990s.

## 2024-03-13 NOTE — ASSESSMENT & PLAN NOTE
Chronic, stable. Reviewed chest x-ray from 2018. Denies chest pain, pain with ambulation, and weakness of extremities.

## 2024-03-13 NOTE — ASSESSMENT & PLAN NOTE
Chronic, stable. Reports an average of 6 hours of sleep per night. Denies use of over-the-counter sleep aids.

## 2024-03-13 NOTE — ASSESSMENT & PLAN NOTE
"Chronic, stable. Reviewed DEXA scan from November 2022: \"according to the World Health Organization classification, bone mineral density of this patient is osteopenic in the left femur and worsening significantly. The lumbar spine density is normal and without change.\" Denies recent falls or fractures.    "

## 2024-03-13 NOTE — ASSESSMENT & PLAN NOTE
BMI is 21.97 kg/m2. Provided education on heart healthy diet with adequate intake of fruits, vegetables, and whole grains. Encourage 30 minutes of moderate exercise 3-4 times a week.

## 2024-03-18 ENCOUNTER — OFFICE VISIT (OUTPATIENT)
Dept: FAMILY PLANNING/WOMEN'S HEALTH CLINIC | Facility: PHYSICIAN GROUP | Age: 74
End: 2024-03-18
Attending: FAMILY MEDICINE
Payer: MEDICARE

## 2024-03-18 VITALS
SYSTOLIC BLOOD PRESSURE: 116 MMHG | DIASTOLIC BLOOD PRESSURE: 58 MMHG | BODY MASS INDEX: 21.97 KG/M2 | HEIGHT: 63 IN | WEIGHT: 124 LBS

## 2024-03-18 DIAGNOSIS — E78.5 HYPERLIPIDEMIA, UNSPECIFIED HYPERLIPIDEMIA TYPE: ICD-10-CM

## 2024-03-18 DIAGNOSIS — H25.9 AGE-RELATED CATARACT OF BOTH EYES, UNSPECIFIED AGE-RELATED CATARACT TYPE: ICD-10-CM

## 2024-03-18 DIAGNOSIS — F51.01 PRIMARY INSOMNIA: ICD-10-CM

## 2024-03-18 DIAGNOSIS — M85.852 OSTEOPENIA OF LEFT HIP: ICD-10-CM

## 2024-03-18 DIAGNOSIS — F33.42 RECURRENT MAJOR DEPRESSIVE DISORDER, IN FULL REMISSION (HCC): ICD-10-CM

## 2024-03-18 DIAGNOSIS — G43.009 MIGRAINE WITHOUT AURA AND WITHOUT STATUS MIGRAINOSUS, NOT INTRACTABLE: ICD-10-CM

## 2024-03-18 DIAGNOSIS — M17.12 PRIMARY OSTEOARTHRITIS OF LEFT KNEE: ICD-10-CM

## 2024-03-18 DIAGNOSIS — E89.0 POSTOPERATIVE HYPOTHYROIDISM: ICD-10-CM

## 2024-03-18 DIAGNOSIS — I70.0 AORTIC ATHEROSCLEROSIS (HCC): ICD-10-CM

## 2024-03-18 DIAGNOSIS — G47.62 NOCTURNAL LEG CRAMPS: ICD-10-CM

## 2024-03-18 PROBLEM — M25.511 CHRONIC RIGHT SHOULDER PAIN: Status: RESOLVED | Noted: 2022-02-04 | Resolved: 2024-03-18

## 2024-03-18 PROBLEM — G89.29 CHRONIC RIGHT SHOULDER PAIN: Status: RESOLVED | Noted: 2022-02-04 | Resolved: 2024-03-18

## 2024-03-18 PROCEDURE — 3074F SYST BP LT 130 MM HG: CPT

## 2024-03-18 PROCEDURE — 3078F DIAST BP <80 MM HG: CPT

## 2024-03-18 PROCEDURE — G0439 PPPS, SUBSEQ VISIT: HCPCS

## 2024-03-18 PROCEDURE — 1125F AMNT PAIN NOTED PAIN PRSNT: CPT

## 2024-03-18 SDOH — ECONOMIC STABILITY: FOOD INSECURITY: WITHIN THE PAST 12 MONTHS, YOU WORRIED THAT YOUR FOOD WOULD RUN OUT BEFORE YOU GOT MONEY TO BUY MORE.: NEVER TRUE

## 2024-03-18 SDOH — ECONOMIC STABILITY: FOOD INSECURITY: WITHIN THE PAST 12 MONTHS, THE FOOD YOU BOUGHT JUST DIDN'T LAST AND YOU DIDN'T HAVE MONEY TO GET MORE.: NEVER TRUE

## 2024-03-18 SDOH — ECONOMIC STABILITY: HOUSING INSECURITY: IN THE LAST 12 MONTHS, HOW MANY PLACES HAVE YOU LIVED?: 1

## 2024-03-18 SDOH — ECONOMIC STABILITY: INCOME INSECURITY: IN THE LAST 12 MONTHS, WAS THERE A TIME WHEN YOU WERE NOT ABLE TO PAY THE MORTGAGE OR RENT ON TIME?: NO

## 2024-03-18 SDOH — ECONOMIC STABILITY: INCOME INSECURITY: HOW HARD IS IT FOR YOU TO PAY FOR THE VERY BASICS LIKE FOOD, HOUSING, MEDICAL CARE, AND HEATING?: NOT HARD AT ALL

## 2024-03-18 SDOH — ECONOMIC STABILITY: HOUSING INSECURITY
IN THE LAST 12 MONTHS, WAS THERE A TIME WHEN YOU DID NOT HAVE A STEADY PLACE TO SLEEP OR SLEPT IN A SHELTER (INCLUDING NOW)?: NO

## 2024-03-18 SDOH — ECONOMIC STABILITY: TRANSPORTATION INSECURITY
IN THE PAST 12 MONTHS, HAS LACK OF TRANSPORTATION KEPT YOU FROM MEETINGS, WORK, OR FROM GETTING THINGS NEEDED FOR DAILY LIVING?: NO

## 2024-03-18 SDOH — ECONOMIC STABILITY: TRANSPORTATION INSECURITY
IN THE PAST 12 MONTHS, HAS THE LACK OF TRANSPORTATION KEPT YOU FROM MEDICAL APPOINTMENTS OR FROM GETTING MEDICATIONS?: NO

## 2024-03-18 ASSESSMENT — ACTIVITIES OF DAILY LIVING (ADL): BATHING_REQUIRES_ASSISTANCE: 0

## 2024-03-18 ASSESSMENT — PATIENT HEALTH QUESTIONNAIRE - PHQ9: CLINICAL INTERPRETATION OF PHQ2 SCORE: 0

## 2024-03-18 ASSESSMENT — PAIN SCALES - GENERAL: PAINLEVEL: 4=SLIGHT-MODERATE PAIN

## 2024-03-18 ASSESSMENT — FIBROSIS 4 INDEX: FIB4 SCORE: 1.82

## 2024-03-18 ASSESSMENT — ENCOUNTER SYMPTOMS: GENERAL WELL-BEING: GOOD

## 2024-03-18 NOTE — ASSESSMENT & PLAN NOTE
Chronic, stable. Reports that nocturnal cramps have improved since supplementing with magnesium and potassium.

## 2024-03-18 NOTE — ASSESSMENT & PLAN NOTE
Chronic, stable. Reports that cataracts are not ready for surgery. Followed by ophthalmology, Dr. Pitts.

## 2024-03-18 NOTE — ASSESSMENT & PLAN NOTE
Chronic, stable. Currently taking ibuprofen as needed pain control. Denies numbness, tingling, and interference with activities of daily living.

## 2024-03-18 NOTE — PROGRESS NOTES
Comprehensive Health Assessment Program     Radha Land is a 74 y.o. here for her comprehensive health assessment.    Patient Active Problem List    Diagnosis Date Noted    Tinnitus of right ear 04/20/2023    Subjective hearing loss 04/20/2023    Aortic atherosclerosis (HCC) 04/20/2022    Hyperlipidemia 02/04/2022    Primary insomnia 02/04/2022    Age-related cataract of both eyes 02/04/2022    Nocturnal leg cramps 02/04/2022    BMI 21.0-21.9, adult 06/23/2021    Primary osteoarthritis of left knee 01/24/2018    Recurrent major depressive disorder, in full remission (HCC) 11/14/2016    Migraine without aura and without status migrainosus, not intractable 06/01/2015    Osteopenia of left hip 06/01/2015    Postoperative hypothyroidism 09/02/2014       Current Outpatient Medications   Medication Sig Dispense Refill    atorvastatin (LIPITOR) 20 MG Tab Take 1 tablet by mouth every evening 100 Tablet 3    levothyroxine (SYNTHROID) 50 MCG Tab TAKE ONE TABLET BY MOUTH EVERY MORNING ON AN EMPTY STOMACH 90 Tablet 1    SUMAtriptan (IMITREX) 50 MG Tab Take 1 Tablet by mouth one time as needed for Migraine for up to 1 dose. 9 Tablet 3    Ascorbic Acid (VITAMIN C PO) Take  by mouth.      VITAMIN D PO Take 2,000 Int'l Units/day by mouth every day.      coenzyme Q-10 30 MG capsule Take 60 mg by mouth every day.      CALCIUM-VITAMIN D PO Take  by mouth.      Cyanocobalamin (VITAMIN B12 PO) Take  by mouth.      MAGNESIUM PO Take  by mouth.      POTASSIUM PO Take  by mouth.      ibuprofen (MOTRIN) 200 MG Tab Take 200 mg by mouth 1 time a day as needed.      Psyllium (METAMUCIL PO) Take  by mouth every day.      fluticasone (FLONASE) 50 MCG/ACT nasal spray Administer 1 Spray into affected nostril(S) every day. 16 g 11     No current facility-administered medications for this visit.          Current supplements as per medication list.     Allergies:   Patient has no known allergies.  Social History     Tobacco Use     Smoking status: Former     Current packs/day: 0.00     Average packs/day: 0.5 packs/day for 16.0 years (8.0 ttl pk-yrs)     Types: Cigarettes     Start date: 1968     Quit date: 1984     Years since quittin.7    Smokeless tobacco: Never   Vaping Use    Vaping Use: Never used   Substance Use Topics    Alcohol use: Yes     Comment: 1 glass of beer/wine a day    Drug use: No     Family History   Problem Relation Age of Onset    Hyperlipidemia Mother     Thyroid Mother         Hypothyroid    Cancer Mother 90        uterine cancer    Diabetes Father     Heart Failure Father     Heart Disease Father         Rhumatic vavular heart disease    Other Father         rheumatic fever    Diabetes Sister     Cancer Other         breast    Diabetes Brother     Cancer Maternal Grandmother         uterine     Radha  has a past medical history of Chalazion of left upper eyelid (2023), Chronic right shoulder pain, Disorder of arteries and arterioles (HCC) (2021), History of squamous cell carcinoma of skin, History of Sweet syndrome, History of vitamin D deficiency, Hyperlipidemia, Impingement syndrome of right shoulder (10/28/2021), Migraine, Peripheral arterial disease (HCC) (2023), Postoperative pain, SLAP lesion of right shoulder (10/28/2021), Strain of tendon of right rotator cuff (10/28/2021), Thyroid disease, and Vitamin D insufficiency (2017).   Past Surgical History:   Procedure Laterality Date    PB SHLDR ARTHROSCOP,SURG,W/ROTAT CUFF REPB Right 2021    Procedure: RIGHT SHOULDER ARTHROSCOPY DEBRIDEMENT, RIGHT ROTATOR CUFF REPAIR, RIGHT SUBACROMIAL DECOMPRESSION, RIGHT DISTAL CLAVICLE EXCISION, REPAIRS AS INDICATED;  Surgeon: Vish Garcia M.D.;  Location: [facility];  Service: Orthopedics    HEMORRHOIDECTOMY  2017    Procedure: HEMORRHOIDECTOMY- ARTERY LIGATION, RECTAL ANO REPAIR;  Surgeon: Aleksandr Quinteros M.D.;  Location: [facility];  Service:  General    ABDOMINAL HYSTERECTOMY TOTAL      Fibroids // 1990    OTHER      hemrroidectomy    OTHER ORTHOPEDIC SURGERY      surgery to big toe    THYROIDECTOMY      partial       Screening:  In the last six months have you experienced any leakage of urine? No    Depression Screening  Little interest or pleasure in doing things?  0 - not at all  Feeling down, depressed , or hopeless? 0 - not at all  Patient Health Questionnaire Score: 0     If depressive symptoms identified deferred to follow up visit unless specifically addressed in assessment and plan.    Interpretation of PHQ-9 Total Score   Score Severity   1-4 No Depression   5-9 Mild Depression   10-14 Moderate Depression   15-19 Moderately Severe Depression   20-27 Severe Depression    Screening for Cognitive Impairment  Do you or any of your friends or family members have any concern about your memory? No  Three Minute Recall (Leader, Season, Table) 3/3    Cristi clock face with all 12 numbers and set the hands to show 10 minutes after 11.  Yes 5/5  Cognitive concerns identified deferred for follow up unless specifically addressed in assessment and plan.    Fall Risk Assessment  Has the patient had two or more falls in the last year or any fall with injury in the last year?  No    Safety Assessment  Do you always wear your seatbelt?  Yes  Any changes to home needed to function safely? No  Difficulty hearing.  No  Patient counseled about all safety risks that were identified.    Functional Assessment ADLs  Are there any barriers preventing you from cooking for yourself or meeting nutritional needs?  No.    Are there any barriers preventing you from driving safely or obtaining transportation?  No.    Are there any barriers preventing you from using a telephone or calling for help?  No    Are there any barriers preventing you from shopping?  No.    Are there any barriers preventing you from taking care of your own finances?  No    Are there any barriers preventing  you from managing your medications?  No    Are there any barriers preventing you from showering, bathing or dressing yourself? No    Are there any barriers preventing you from doing housework or laundry? No  Are there any barriers preventing you from using the toilet?No  Are you currently engaging in any exercise or physical activity?  Yes.      Self-Assessment of Health  What is your perception of your health? Good  Do you sleep more than six hours a night? Yes  In the past 7 days, how much did pain keep you from doing your normal work? None  Do you spend quality time with family or friends (virtually or in person)? Yes  Do you usually eat a heart healthy diet that constists of a variety of fruits, vegetables, whole grains and fiber? Yes  Do you eat foods high in fat and/or Fast Food more than three times per week? No    Advance Care Planning  Do you have an Advance Directive, Living Will, Durable Power of , or POLST? Yes          is on file      Health Maintenance Summary            IMM DTaP/Tdap/Td Vaccine (2 - Td or Tdap) Next due on 9/2/2024 09/02/2014  Imm Admin: Tdap Vaccine              Bone Density Scan (Every 2 Years) Next due on 11/17/2024 11/17/2022  DS-BONE DENSITY STUDY (DEXA)    11/16/2020  DS-BONE DENSITY STUDY (DEXA)    11/11/2015  DS-BONE DENSITY STUDY (DEXA)    07/14/2010  DS-BONE DENSITY STUDY (DEXA)              Annual Wellness Visit (Yearly) Next due on 3/18/2025      03/18/2024  Level of Service: NC ANNUAL WELLNESS VISIT-INCLUDES PPPS SUBSEQUE*    04/20/2023  Level of Service: NC PREVENTIVE VISIT,EST,65 & OVER    03/27/2023  Level of Service: NC ANNUAL WELLNESS VISIT-INCLUDES PPPS SUBSEQUE*    08/24/2022  Level of Service: NC PREVENTIVE VISIT,EST,65 & OVER    04/20/2022  Level of Service: NC ANNUAL WELLNESS VISIT-INCLUDES PPPS SUBSEQUE*    Only the first 5 history entries have been loaded, but more history exists.              Colorectal Cancer Screening (Colonoscopy -  Preferred) Next due on 6/23/2027 06/23/2017  REFERRAL TO GI FOR COLONOSCOPY    09/08/2014  OCCULT BLOOD FECES IMMUNOASSAY    11/15/2007  AMB REFERRAL TO GI FOR COLONOSCOPY              Pneumococcal Vaccine: 65+ Years (Series Information) Completed      03/02/2017  Imm Admin: Pneumococcal Conjugate Vaccine (Prevnar/PCV-13)    09/02/2014  Imm Admin: Pneumococcal polysaccharide vaccine (PPSV-23)              Hepatitis C Screening  Completed      06/02/2017  Hepatitis C Antibody component of HEP C VIRUS ANTIBODY    07/29/2013  Hepatitis C Antibody component of HEP C VIRUS ANTIBODY              Zoster (Shingles) Vaccines (Series Information) Completed      12/11/2018  Imm Admin: Zoster Vaccine Recombinant (RZV) (SHINGRIX)    09/25/2018  Imm Admin: Zoster Vaccine Recombinant (RZV) (SHINGRIX)    05/11/2012  Imm Admin: Zoster Vaccine Live (ZVL) (Zostavax) - HISTORICAL DATA              Influenza Vaccine (Series Information) Completed      10/20/2023  Imm Admin: Influenza Vaccine Adult HD    10/24/2022  Imm Admin: Influenza, Unspecified - HISTORICAL DATA    10/25/2021  Imm Admin: Influenza, Unspecified - HISTORICAL DATA    10/24/2020  Imm Admin: Influenza Vaccine Adult HD    11/13/2019  Imm Admin: Influenza Vaccine Adult HD    Only the first 5 history entries have been loaded, but more history exists.              COVID-19 Vaccine (Series Information) Completed      10/20/2023  Imm Admin: Comirnaty (Covid-19 Vaccine, Mrna, [id number] Formula)    05/06/2023  Imm Admin: PFIZER BIVALENT SARS-COV-2 VACCINE (12+)    09/17/2022  Imm Admin: PFIZER BIVALENT SARS-COV-2 VACCINE (12+)    04/14/2022  Imm Admin: PFIZER DAUGHERTY CAP SARS-COV-2 VACCINATION (12+)    10/08/2021  Imm Admin: PFIZER PURPLE CAP SARS-COV-2 VACCINATION (12+)    Only the first 5 history entries have been loaded, but more history exists.              Hepatitis A Vaccine (Hep A) (Series Information) Aged Out      No completion history exists for this topic.               HPV Vaccines (Series Information) Aged Out      No completion history exists for this topic.              Polio Vaccine (Inactivated Polio) (Series Information) Aged Out      No completion history exists for this topic.              Meningococcal Immunization (Series Information) Aged Out      No completion history exists for this topic.              Discontinued - Mammogram  Scheduled for 3/25/2024        Frequency changed to Never automatically (Topic No Longer Applies)    03/21/2023  MA-SCREENING MAMMO BILAT W/TOMOSYNTHESIS W/CAD    03/14/2022  MA-SCREENING MAMMO BILAT W/TOMOSYNTHESIS W/CAD    02/23/2021  MA-SCREENING MAMMO BILAT W/TOMOSYNTHESIS W/CAD    02/04/2020  MA-SCREENING MAMMO BILAT W/TOMOSYNTHESIS W/CAD    Only the first 5 history entries have been loaded, but more history exists.              Discontinued - Cervical Cancer Screening  Discontinued        Frequency changed to Never automatically (Topic No Longer Applies)    12/20/2016  THINPREP PAP WITH HPV    12/20/2016  PATHOLOGY GYN SPECIMEN              Discontinued - Hepatitis B Vaccine (Hep B)  Discontinued      No completion history exists for this topic.                    Patient Care Team:  Jo Cardona D.O. as PCP - General (Family Medicine)  Jo Cardona D.O. as PCP - H Paneled (Family Medicine)  Johnny You M.D. as Consulting Physician (Gastroenterology)  SANDOVAL Morales as Mid Level Provider (Dermatology)  Cristal Allen (Inactive)      Financial Resource Strain: Low Risk  (3/18/2024)    Overall Financial Resource Strain (CARDIA)     Difficulty of Paying Living Expenses: Not hard at all      Transportation Needs: No Transportation Needs (3/18/2024)    PRAPARE - Transportation     Lack of Transportation (Medical): No     Lack of Transportation (Non-Medical): No      Food Insecurity: No Food Insecurity (3/18/2024)    Hunger Vital Sign     Worried About Running Out of Food in the Last Year: Never true     Ran Out of  "Food in the Last Year: Never true        Encounter Vitals  Blood Pressure : 116/58  O2 Delivery Device: None - Room Air  Weight: 56.2 kg (124 lb)  Height: 160 cm (5' 3\")  BMI (Calculated): 21.97  Pain Score: 4=Slight-Moderate Pain  DME  O2 Delivery Device: None - Room Air     Alert, oriented in no acute distress.  Eye contact is good, speech goal directed, affect calm.    Assessment and Plan. The following treatment and monitoring plan is recommended:    Age-related cataract of both eyes  Chronic, stable. Reports that she has cataracts, but is not a candidate for surgery. Followed by ophthalmology, Dr. Pitts.     Aortic atherosclerosis (HCC)  Chronic, stable. Reviewed chest x-ray from 2018. Denies chest pain, pain with ambulation, and weakness of extremities.    BMI 21.0-21.9, adult  BMI is 21.97 kg/m2. Provided education on heart healthy diet with adequate intake of fruits, vegetables, and whole grains. Encourage 30 minutes of moderate exercise 3-4 times a week.    Hyperlipidemia  Chronic, stable. Currently taking atorvastatin 20 mg daily. Reports that she walks, swims, and golfs regularly. Denies chest pain, claudication, and dizziness.    Migraine without aura and without status migrainosus, not intractable  Chronic, stable. Reports use of sumatriptan as needed for migraines. States that last migraine was in the 1990s.    Nocturnal leg cramps  Chronic, stable. Reports that cramps have improved with supplementation of magnesium and potassium. Encourage at least 64 ounces of water daily.    Osteopenia of left hip  Chronic, stable. Reviewed DEXA scan from November 2022: \"according to the World Health Organization classification, bone mineral density of this patient is osteopenic in the left femur and worsening significantly. The lumbar spine density is normal and without change.\" Denies recent falls or fractures.    Postoperative hypothyroidism  Chronic, stable. History of partial thyroidectomy. Currently taking " levothyroxine 50 mcg daily as prescribed. Denies fatigue, palpitations, hair and skin changes, temperature intolerance, changes in bowel habits, and weight loss or weight gain.      Primary insomnia  Chronic, stable. Reports an average of 6 hours of sleep per night. Denies use of over-the-counter sleep aids.    Primary osteoarthritis of left knee  Chronic, stable. Reports that pain is well-managed with ibuprofen as needed. Denies numbness, tingling, and interference with activities of daily living.    Recurrent major depressive disorder, in full remission (HCC)  Chronic, stable. No current medication use. Reports that mood is good. Denies SI/HI.      Services suggested: No services needed at this time  Health Care Screening: Age-appropriate preventive services recommended by USPTF and ACIP covered by Medicare were discussed today. Services ordered if indicated and agreed upon by the patient.  Referrals offered: Community-based lifestyle interventions to reduce health risks and promote self-management and wellness, fall prevention, nutrition, physical activity, tobacco-use cessation, weight loss, and mental health services as per orders if indicated.    Discussion today about general wellness and lifestyle habits:    Prevent falls and reduce trip hazards; Cautioned about securing or removing rugs.  Have a working fire alarm and carbon monoxide detector.  Engage in regular physical activity and social activities.    Follow-up: Return for appointment with Primary Care Provider as needed.

## 2024-03-19 ENCOUNTER — OFFICE VISIT (OUTPATIENT)
Dept: MEDICAL GROUP | Facility: LAB | Age: 74
End: 2024-03-19
Payer: MEDICARE

## 2024-03-19 VITALS
RESPIRATION RATE: 20 BRPM | HEIGHT: 63 IN | HEART RATE: 61 BPM | WEIGHT: 123 LBS | DIASTOLIC BLOOD PRESSURE: 60 MMHG | SYSTOLIC BLOOD PRESSURE: 118 MMHG | TEMPERATURE: 97.5 F | OXYGEN SATURATION: 97 % | BODY MASS INDEX: 21.79 KG/M2

## 2024-03-19 DIAGNOSIS — I70.0 AORTIC ATHEROSCLEROSIS (HCC): ICD-10-CM

## 2024-03-19 DIAGNOSIS — M85.852 OSTEOPENIA OF LEFT HIP: ICD-10-CM

## 2024-03-19 DIAGNOSIS — G43.009 MIGRAINE WITHOUT AURA AND WITHOUT STATUS MIGRAINOSUS, NOT INTRACTABLE: ICD-10-CM

## 2024-03-19 DIAGNOSIS — E78.5 HYPERLIPIDEMIA, UNSPECIFIED HYPERLIPIDEMIA TYPE: ICD-10-CM

## 2024-03-19 DIAGNOSIS — Z87.898 HISTORY OF MOTION SICKNESS: ICD-10-CM

## 2024-03-19 DIAGNOSIS — Z00.00 ENCOUNTER FOR ANNUAL GENERAL MEDICAL EXAMINATION WITHOUT ABNORMAL FINDINGS IN ADULT: ICD-10-CM

## 2024-03-19 DIAGNOSIS — E89.0 POSTOPERATIVE HYPOTHYROIDISM: ICD-10-CM

## 2024-03-19 PROCEDURE — 99212 OFFICE O/P EST SF 10 MIN: CPT | Mod: 25 | Performed by: STUDENT IN AN ORGANIZED HEALTH CARE EDUCATION/TRAINING PROGRAM

## 2024-03-19 PROCEDURE — 3074F SYST BP LT 130 MM HG: CPT | Performed by: STUDENT IN AN ORGANIZED HEALTH CARE EDUCATION/TRAINING PROGRAM

## 2024-03-19 PROCEDURE — 99397 PER PM REEVAL EST PAT 65+ YR: CPT | Performed by: STUDENT IN AN ORGANIZED HEALTH CARE EDUCATION/TRAINING PROGRAM

## 2024-03-19 PROCEDURE — 3078F DIAST BP <80 MM HG: CPT | Performed by: STUDENT IN AN ORGANIZED HEALTH CARE EDUCATION/TRAINING PROGRAM

## 2024-03-19 RX ORDER — SUMATRIPTAN 50 MG/1
50 TABLET, FILM COATED ORAL
Qty: 9 TABLET | Refills: 3 | Status: SHIPPED
Start: 2024-03-19 | End: 2024-03-21

## 2024-03-19 RX ORDER — SCOLOPAMINE TRANSDERMAL SYSTEM 1 MG/1
1 PATCH, EXTENDED RELEASE TRANSDERMAL
Qty: 4 PATCH | Refills: 0 | Status: SHIPPED | OUTPATIENT
Start: 2024-03-19

## 2024-03-19 ASSESSMENT — PATIENT HEALTH QUESTIONNAIRE - PHQ9
6. FEELING BAD ABOUT YOURSELF - OR THAT YOU ARE A FAILURE OR HAVE LET YOURSELF OR YOUR FAMILY DOWN: NOT AL ALL
3. TROUBLE FALLING OR STAYING ASLEEP OR SLEEPING TOO MUCH: SEVERAL DAYS
2. FEELING DOWN, DEPRESSED, IRRITABLE, OR HOPELESS: NOT AT ALL
7. TROUBLE CONCENTRATING ON THINGS, SUCH AS READING THE NEWSPAPER OR WATCHING TELEVISION: NOT AT ALL
8. MOVING OR SPEAKING SO SLOWLY THAT OTHER PEOPLE COULD HAVE NOTICED. OR THE OPPOSITE, BEING SO FIGETY OR RESTLESS THAT YOU HAVE BEEN MOVING AROUND A LOT MORE THAN USUAL: NOT AT ALL
5. POOR APPETITE OR OVEREATING: NOT AT ALL
9. THOUGHTS THAT YOU WOULD BE BETTER OFF DEAD, OR OF HURTING YOURSELF: NOT AT ALL
4. FEELING TIRED OR HAVING LITTLE ENERGY: SEVERAL DAYS
1. LITTLE INTEREST OR PLEASURE IN DOING THINGS: NOT AT ALL
SUM OF ALL RESPONSES TO PHQ9 QUESTIONS 1 AND 2: 0
SUM OF ALL RESPONSES TO PHQ QUESTIONS 1-9: 2

## 2024-03-19 ASSESSMENT — FIBROSIS 4 INDEX: FIB4 SCORE: 1.82

## 2024-03-19 NOTE — PROGRESS NOTES
Subjective:     Chief Complaint   Patient presents with    Annual Exam    Other     Medication for motion sickness     HPI:   Radha Land is a 74 y.o. female who presents for annual exam.     Completed labs. Denies hypothyroid symptoms, taking levothyroxine 50mcg daily on an empty stomach.     ACUTE CONCERN  **Patient was informed the annual wellness visit does not include a discussion of acute/new concerns. She was informed that this portion of the visit will be coded separately and She will receive a separate bill later. Patient expressed understanding.    History of motion sickness   This is chronic.  Patient previously has used scopolamine which was efficacious and well-tolerated.  Going on a river cruise in April for 2 weeks and concerned that she will feel sick on the cruise.  Plans on purchasing Dramamine but wonders about having scopolamine patches in case she does have motion sickness.     Ob-Gyn/ History:    Patient has GYN provider: no  /Para:    No history of abnormal pap smears.  Gyn Surgery:  D&C, hys for fibroids  Patient is not currently sexually active.  No significant bloating/fluid retention, pelvic pain, or vaginal discharge.  Post-menopausal bleeding: none  Urinary incontinence: denies    Health Maintenance  Advanced directive: on file   Osteoporosis Screen/ DEXA: repeat due 2024   Cholesterol Screenin   Diabetes Screenin   Aspirin Use: Recommended 81mg daily The 10-year ASCVD risk score (Pamela HUGO, et al., 2019) is: 13.9%  Diet: Overall good, but admits a sweet tooth.   Exercise: Walks 4-5 times a week, exercises with weights 3-4 times a week, swims and golfs.   Substance Abuse: 1 glass of beer/wine a day, denies drugs     Cancer screening  Colorectal Cancer Screening: consider at 2027  Lung Cancer Screening:  Not indicated   Cervical Cancer Screening: aged out, s/p hys   Breast Cancer Screening: 3/2023, desires to continue annually    Infectious  disease screening/Immunizations  --HIV Screenin, negative. No risk factors.  --Hepatitis C Screenin negative.  --Immunizations: Reviewed with patient and UTD.     She  has a past medical history of Chalazion of left upper eyelid (2023), Chronic right shoulder pain, Disorder of arteries and arterioles (HCC) (2021), History of squamous cell carcinoma of skin, History of Sweet syndrome, History of vitamin D deficiency, Hyperlipidemia, Impingement syndrome of right shoulder (10/28/2021), Migraine, Peripheral arterial disease (HCC) (2023), Postoperative pain, SLAP lesion of right shoulder (10/28/2021), Strain of tendon of right rotator cuff (10/28/2021), Thyroid disease, and Vitamin D insufficiency (2017).  She  has a past surgical history that includes abdominal hysterectomy total; thyroidectomy; other orthopedic surgery; hemorrhoidectomy (2017); other; pr shldr arthroscop,surg,w/rotat cuff repr (Right, 2021); and dilation and curettage.    Family History   Problem Relation Age of Onset    Hyperlipidemia Mother     Thyroid Mother         Hypothyroid    Cancer Mother 90        uterine cancer    Diabetes Father     Heart Failure Father     Heart Disease Father         Rhumatic vavular heart disease    Other Father         rheumatic fever    Diabetes Sister     Cancer Other         breast    Diabetes Brother     Cancer Maternal Grandmother         uterine       Social History     Socioeconomic History    Marital status:      Spouse name: Not on file    Number of children: 0    Years of education: Not on file    Highest education level: Not on file   Occupational History    Not on file   Tobacco Use    Smoking status: Former     Current packs/day: 0.00     Average packs/day: 0.5 packs/day for 16.0 years (8.0 ttl pk-yrs)     Types: Cigarettes     Start date: 1968     Quit date: 1984     Years since quittin.7    Smokeless tobacco: Never   Vaping Use     Vaping Use: Never used   Substance and Sexual Activity    Alcohol use: Yes     Comment: 1 glass of beer/wine a day    Drug use: No    Sexual activity: Not Currently   Other Topics Concern    Not on file   Social History Narrative    From [place]. Family lives in [facility].     Social Determinants of Health     Financial Resource Strain: Low Risk  (3/18/2024)    Overall Financial Resource Strain (CARDIA)     Difficulty of Paying Living Expenses: Not hard at all   Food Insecurity: No Food Insecurity (3/18/2024)    Hunger Vital Sign     Worried About Running Out of Food in the Last Year: Never true     Ran Out of Food in the Last Year: Never true   Transportation Needs: No Transportation Needs (3/18/2024)    PRAPARE - Transportation     Lack of Transportation (Medical): No     Lack of Transportation (Non-Medical): No   Physical Activity: Not on file   Stress: Not on file   Social Connections: Not on file   Intimate Partner Violence: Not on file   Housing Stability: Low Risk  (3/18/2024)    Housing Stability Vital Sign     Unable to Pay for Housing in the Last Year: No     Number of Places Lived in the Last Year: 1     Unstable Housing in the Last Year: No       Patient Active Problem List    Diagnosis Date Noted    Tinnitus of right ear 04/20/2023    Subjective hearing loss 04/20/2023    Aortic atherosclerosis (HCC) 04/20/2022    Hyperlipidemia 02/04/2022    Primary insomnia 02/04/2022    Age-related cataract of both eyes 02/04/2022    Nocturnal leg cramps 02/04/2022    BMI 21.0-21.9, adult 06/23/2021    Primary osteoarthritis of left knee 01/24/2018    Recurrent major depressive disorder, in full remission (HCC) 11/14/2016    Migraine without aura and without status migrainosus, not intractable 06/01/2015    Osteopenia of left hip 06/01/2015    Postoperative hypothyroidism 09/02/2014       Current Outpatient Medications   Medication Sig Dispense Refill    scopolamine (TRANSDERM SCOP, 1.5 MG,) 1 mg/72hr PATCH 72 HR  "Place 1 Patch on the skin every 72 hours. For motion sickness 4 Patch 0    SUMAtriptan (IMITREX) 50 MG Tab Take 1 Tablet by mouth one time as needed for Migraine for up to 1 dose. 9 Tablet 3    atorvastatin (LIPITOR) 20 MG Tab Take 1 tablet by mouth every evening 100 Tablet 3    levothyroxine (SYNTHROID) 50 MCG Tab TAKE ONE TABLET BY MOUTH EVERY MORNING ON AN EMPTY STOMACH 90 Tablet 1    Ascorbic Acid (VITAMIN C PO) Take  by mouth.      VITAMIN D PO Take 2,000 Int'l Units/day by mouth every day.      CALCIUM-VITAMIN D PO Take  by mouth.      Cyanocobalamin (VITAMIN B12 PO) Take  by mouth.      MAGNESIUM PO Take  by mouth.      POTASSIUM PO Take  by mouth.      ibuprofen (MOTRIN) 200 MG Tab Take 200 mg by mouth 1 time a day as needed.      Psyllium (METAMUCIL PO) Take  by mouth every day.      fluticasone (FLONASE) 50 MCG/ACT nasal spray Administer 1 Spray into affected nostril(S) every day. 16 g 11     No current facility-administered medications for this visit.     No Known Allergies    Review of Systems   Constitutional: Negative for fever, chills and malaise/fatigue.   HENT: Negative for congestion.    Eyes: Negative for pain.    Respiratory: Negative for cough and shortness of breath.  Cardiovascular: Negative for leg swelling.   Gastrointestinal: Negative for nausea, vomiting, abdominal pain and diarrhea.   Genitourinary: Negative for dysuria and hematuria.   Skin: Negative for rash.   Neurological: Negative for dizziness, focal weakness and headaches.   Endo/Heme/Allergies: Does not bleed easily.   Psychiatric/Behavioral: Negative for depression.  The patient is not nervous/anxious.      Objective:     /60 (BP Location: Right arm, Patient Position: Sitting, BP Cuff Size: Adult)   Pulse 61   Temp 36.4 °C (97.5 °F) (Temporal)   Resp 20   Ht 1.6 m (5' 3\")   Wt 55.8 kg (123 lb)   SpO2 97%   BMI 21.79 kg/m²   Body mass index is 21.79 kg/m².  Wt Readings from Last 4 Encounters:   03/19/24 55.8 kg (123 " lb)   03/18/24 56.2 kg (124 lb)   10/18/23 56.2 kg (124 lb)   06/29/23 54.4 kg (120 lb)     Physical Exam:  Constitutional: Well-developed and well-nourished. Not diaphoretic. No distress.   Skin: Skin is warm and dry. No rash noted.  Head: Atraumatic without lesions.  Eyes: Conjunctivae and extraocular motions are normal. Pupils are equal, round. No scleral icterus.   Ears:  External ears unremarkable. Tympanic membranes clear and intact.  Nose: Nares patent. No discharge.   Mouth/Throat: Dentition is good. Tongue normal. Oropharynx is clear and moist. Posterior pharynx without erythema or exudates.  Neck: Supple, trachea midline. Normal range of motion.  Thyroid surgically absent.  No lymphadenopathy--cervical or supraclavicular.  Cardiovascular: Regular rate and rhythm, S1 and S2 without murmur, rubs, or gallops.  Lungs: Normal inspiratory effort, CTA bilaterally, no wheezes/rhonchi/rales  Abdomen: Soft, non tender, and without distention. Active bowel sounds in all four quadrants. No rebound, guarding, masses or HSM.  Extremities: No cyanosis, clubbing, erythema, nor edema.   Neurological: Alert and oriented x 3. No gross or focal deficits.   Psychiatric:  Behavior, mood, and affect are appropriate.    Labs: Reviewed from 3/12/2024  Imaging: Reviewed from 11/17/2022, 3/21/2023    Assessment and Plan:     1. Encounter for annual general medical examination without abnormal findings in adult  HCM: Reviewed with patient as above.  Immunizations UTD.   Age-appropriate anticipatory guidance discussed: sun screen, dental visits, healthy diet/exercise.    2. Postoperative hypothyroidism  Chronic postoperative hypothyroidism (benign indications), T4 normal but TSH elevated.  Denies any symptoms of hypothyroidism so continue with below medication and trend again in the next few weeks.  Plan pending results.  - TSH WITH REFLEX TO FT4; Future    levothyroxine (SYNTHROID) 50 MCG Tab, TAKE ONE TABLET BY MOUTH EVERY MORNING  ON AN EMPTY STOMACH, Disp: 90 Tablet, Rfl: 1    3. Osteopenia of left hip  Not at threshold to treat.  Discussed importance of exercise, fall precautions and adequate vitamin D/calcium.  Trend in November.  - DS-BONE DENSITY STUDY (DEXA); Future    4. Aortic atherosclerosis (HCC)  5. Hyperlipidemia, unspecified hyperlipidemia type  Chronic, controlled hyperlipidemia. Continue statin as below.  Given ASCVD risk greater than 10% in the next 10 years as well as aortic atherosclerosis noted on chest x-ray prior recommend she start 81 mg aspirin daily as well for primary prevention.    atorvastatin (LIPITOR) 20 MG Tab, Take 1 tablet by mouth every evening, Disp: 100 Tablet, Rfl: 3    6. Migraine without aura and without status migrainosus, not intractable  Chronic, occurring monthly and medication below works very well.  Improved after hysterectomy.  Gave return precautions for worsening, discussed avoiding medication overuse.  - SUMAtriptan (IMITREX) 50 MG Tab; Take 1 Tablet by mouth one time as needed for Migraine for up to 1 dose.  Dispense: 9 Tablet; Refill: 3    7. History of motion sickness  Chronic. Discussed options for treatment, has tolerated scopolamine well without issue so with Dramamine over-the-counter not effective can use during her trip.  Discussed potential medication side effects.  - scopolamine (TRANSDERM SCOP, 1.5 MG,) 1 mg/72hr PATCH 72 HR; Place 1 Patch on the skin every 72 hours. For motion sickness  Dispense: 4 Patch; Refill: 0     Follow-up: Return in about 6 months (around 9/19/2024), or if symptoms worsen or fail to improve.

## 2024-03-19 NOTE — PATIENT INSTRUCTIONS
Can start 81mg daily (enteric coated for heart attack and stroke prevention)    Repeat bone density in November    Bone health  Vitamin D3 1000IU/25mcg daily (but don't change your current dose, it's perfect)  Calcium 1200mg/day (diet best, 600mg twice a day)     non-fasting labs due a few weeks to check thyroid     Ocusoft hypochlor spray (lid hygiene spray)

## 2024-03-21 RX ORDER — SUMATRIPTAN 50 MG/1
50 TABLET, FILM COATED ORAL
Qty: 9 TABLET | Refills: 3 | Status: SHIPPED | OUTPATIENT
Start: 2024-03-21

## 2024-03-25 ENCOUNTER — HOSPITAL ENCOUNTER (OUTPATIENT)
Dept: RADIOLOGY | Facility: MEDICAL CENTER | Age: 74
End: 2024-03-25
Attending: STUDENT IN AN ORGANIZED HEALTH CARE EDUCATION/TRAINING PROGRAM
Payer: MEDICARE

## 2024-03-25 DIAGNOSIS — Z12.31 VISIT FOR SCREENING MAMMOGRAM: ICD-10-CM

## 2024-03-25 PROCEDURE — 77067 SCR MAMMO BI INCL CAD: CPT

## 2024-04-23 ENCOUNTER — HOSPITAL ENCOUNTER (OUTPATIENT)
Dept: LAB | Facility: MEDICAL CENTER | Age: 74
End: 2024-04-23
Attending: STUDENT IN AN ORGANIZED HEALTH CARE EDUCATION/TRAINING PROGRAM
Payer: MEDICARE

## 2024-04-23 DIAGNOSIS — E89.0 POSTOPERATIVE HYPOTHYROIDISM: ICD-10-CM

## 2024-04-23 LAB
T4 FREE SERPL-MCNC: 1.24 NG/DL (ref 0.93–1.7)
TSH SERPL DL<=0.005 MIU/L-ACNC: 13.8 UIU/ML (ref 0.38–5.33)

## 2024-04-23 PROCEDURE — 84439 ASSAY OF FREE THYROXINE: CPT

## 2024-04-23 PROCEDURE — 84443 ASSAY THYROID STIM HORMONE: CPT

## 2024-04-23 PROCEDURE — 36415 COLL VENOUS BLD VENIPUNCTURE: CPT

## 2024-04-24 DIAGNOSIS — E89.0 POSTOPERATIVE HYPOTHYROIDISM: ICD-10-CM

## 2024-04-24 RX ORDER — LEVOTHYROXINE SODIUM 0.07 MG/1
75 TABLET ORAL
Qty: 90 TABLET | Refills: 0 | Status: SHIPPED | OUTPATIENT
Start: 2024-04-24

## 2024-07-10 ENCOUNTER — HOSPITAL ENCOUNTER (OUTPATIENT)
Dept: LAB | Facility: MEDICAL CENTER | Age: 74
End: 2024-07-10
Attending: STUDENT IN AN ORGANIZED HEALTH CARE EDUCATION/TRAINING PROGRAM
Payer: MEDICARE

## 2024-07-10 DIAGNOSIS — E89.0 POSTOPERATIVE HYPOTHYROIDISM: ICD-10-CM

## 2024-07-10 LAB — TSH SERPL DL<=0.005 MIU/L-ACNC: 1.9 UIU/ML (ref 0.38–5.33)

## 2024-07-10 PROCEDURE — 36415 COLL VENOUS BLD VENIPUNCTURE: CPT

## 2024-07-10 PROCEDURE — 84443 ASSAY THYROID STIM HORMONE: CPT

## 2024-07-16 DIAGNOSIS — E89.0 POSTOPERATIVE HYPOTHYROIDISM: ICD-10-CM

## 2024-07-17 RX ORDER — LEVOTHYROXINE SODIUM 0.07 MG/1
75 TABLET ORAL
Qty: 90 TABLET | Refills: 3 | Status: SHIPPED | OUTPATIENT
Start: 2024-07-17

## 2024-09-17 ENCOUNTER — OFFICE VISIT (OUTPATIENT)
Dept: MEDICAL GROUP | Facility: LAB | Age: 74
End: 2024-09-17
Payer: MEDICARE

## 2024-09-17 VITALS
WEIGHT: 123 LBS | RESPIRATION RATE: 18 BRPM | BODY MASS INDEX: 21.79 KG/M2 | OXYGEN SATURATION: 99 % | HEIGHT: 63 IN | TEMPERATURE: 98 F | DIASTOLIC BLOOD PRESSURE: 60 MMHG | HEART RATE: 71 BPM | SYSTOLIC BLOOD PRESSURE: 128 MMHG

## 2024-09-17 DIAGNOSIS — E78.5 HYPERLIPIDEMIA, UNSPECIFIED HYPERLIPIDEMIA TYPE: ICD-10-CM

## 2024-09-17 DIAGNOSIS — E55.9 VITAMIN D INSUFFICIENCY: ICD-10-CM

## 2024-09-17 DIAGNOSIS — E89.0 POSTOPERATIVE HYPOTHYROIDISM: ICD-10-CM

## 2024-09-17 DIAGNOSIS — Z23 NEED FOR VACCINATION: ICD-10-CM

## 2024-09-17 DIAGNOSIS — L60.9 NAIL ABNORMALITY: ICD-10-CM

## 2024-09-17 DIAGNOSIS — I70.0 AORTIC ATHEROSCLEROSIS (HCC): ICD-10-CM

## 2024-09-17 DIAGNOSIS — M85.852 OSTEOPENIA OF NECK OF LEFT FEMUR: ICD-10-CM

## 2024-09-17 PROCEDURE — 90471 IMMUNIZATION ADMIN: CPT | Performed by: STUDENT IN AN ORGANIZED HEALTH CARE EDUCATION/TRAINING PROGRAM

## 2024-09-17 PROCEDURE — 3078F DIAST BP <80 MM HG: CPT | Performed by: STUDENT IN AN ORGANIZED HEALTH CARE EDUCATION/TRAINING PROGRAM

## 2024-09-17 PROCEDURE — 99214 OFFICE O/P EST MOD 30 MIN: CPT | Mod: 25 | Performed by: STUDENT IN AN ORGANIZED HEALTH CARE EDUCATION/TRAINING PROGRAM

## 2024-09-17 PROCEDURE — 3074F SYST BP LT 130 MM HG: CPT | Performed by: STUDENT IN AN ORGANIZED HEALTH CARE EDUCATION/TRAINING PROGRAM

## 2024-09-17 PROCEDURE — 90715 TDAP VACCINE 7 YRS/> IM: CPT | Performed by: STUDENT IN AN ORGANIZED HEALTH CARE EDUCATION/TRAINING PROGRAM

## 2024-09-17 ASSESSMENT — ENCOUNTER SYMPTOMS
BRUISES/BLEEDS EASILY: 0
SHORTNESS OF BREATH: 0
CHILLS: 0
ABDOMINAL PAIN: 0
VOMITING: 0
DIARRHEA: 0
WEIGHT LOSS: 0
COUGH: 0
FEVER: 0
NAUSEA: 0

## 2024-09-17 ASSESSMENT — FIBROSIS 4 INDEX: FIB4 SCORE: 1.82

## 2024-09-17 NOTE — PATIENT INSTRUCTIONS
Vaccines due that can be received only at pharmacy:  Flu (we should have at the clinic by the end of the month)     fasting labs due 1-2 weeks prior to next visit (March)    Ideal blood pressure <130/80 and at least <140/90.   Severe range >180/120, with symptoms=ER  Bring in machine to cross check.    Aim for goal of 150 minutes a week of moderate intensity exercise (ex 30 minutes 5 times a week)

## 2024-09-17 NOTE — PROGRESS NOTES
Verbal consent was acquired by the patient to use [employer] ambient listening note generation during this visit Yes.    Subjective:     Chief Complaint   Patient presents with    Follow-Up     HPI:   Radha is a 74 y.o. female who presents today for follow-up.    History of Present Illness  The patient is a 74-year-old female who presents for follow-up.    She sustained an injury to her toenail after accidentally dropping a can on it. The nail turned black, which she initially attributed to her nail polish. She has been applying nail polish over it, expecting the nail to eventually fall off. She reports no pain associated with the nail.    She followed the advice to take low-dose aspirin for approximately 3 months but discontinued due to excessive bruising. She has since returned to using CoQ10 as an antioxidant. She maintains a healthy diet and regular exercise routine. She has been using Metamucil for several years as a preventive measure against high cholesterol and includes a salad in her daily meals. She reports no fatigue, constipation, diarrhea, or weight changes. She occasionally monitors her blood pressure at home and admits to a slight preference for salty foods. She supplements her diet with vitamin D and calcium. She experiences lightheadedness when standing up quickly on occasion but not otherwise. She stays well-hydrated and has not had any falls. She consumes alcohol, but no more than one drink per day if that.    She is scheduled for cataract surgery in both eyes in October 2024.    Health Maintenance: Reviewed with patient.    Review of Systems   Constitutional:  Negative for chills, fever, malaise/fatigue and weight loss.   Respiratory:  Negative for cough and shortness of breath.    Cardiovascular:  Negative for chest pain.   Gastrointestinal:  Negative for abdominal pain, diarrhea, nausea and vomiting.   Skin:  Negative for rash.   Endo/Heme/Allergies:  Does not bruise/bleed easily (Resolved  "after stopping aspirin).       Objective:     Exam:  /60 (BP Location: Left arm, Patient Position: Sitting, BP Cuff Size: Adult)   Pulse 71   Temp 36.7 °C (98 °F)   Resp 18   Ht 1.6 m (5' 3\")   Wt 55.8 kg (123 lb)   SpO2 99%   BMI 21.79 kg/m²  Body mass index is 21.79 kg/m².    Physical Exam  Vitals reviewed.   Constitutional:       General: She is not in acute distress.     Appearance: Normal appearance. She is not ill-appearing.   HENT:      Head: Normocephalic and atraumatic.      Nose: Nose normal.      Mouth/Throat:      Mouth: Mucous membranes are moist.   Eyes:      Conjunctiva/sclera: Conjunctivae normal.   Cardiovascular:      Rate and Rhythm: Normal rate and regular rhythm.      Heart sounds: Normal heart sounds. No murmur heard.  Pulmonary:      Effort: Pulmonary effort is normal. No respiratory distress.      Breath sounds: Normal breath sounds. No stridor. No wheezing, rhonchi or rales.   Musculoskeletal:      Cervical back: Normal range of motion and neck supple. No rigidity or tenderness.      Right lower leg: No edema.      Left lower leg: No edema.   Skin:     Comments: Left great toe nail distally discolored and minimally .  Proximal nail appears normal.   Neurological:      General: No focal deficit present.      Mental Status: She is alert and oriented to person, place, and time.   Psychiatric:         Mood and Affect: Mood normal.         Behavior: Behavior normal.         Thought Content: Thought content normal.       Labs: Reviewed from 3/12/2024, 4/23/2024, 7/10/204  Imaging: Reviewed from 11/2022    Assessment & Plan:     74 y.o. female with the following -     1. Nail abnormality  Nail growing out as expected, discussed that after trauma to the nailbed the nail may not completely grow normally but will monitor for now without any other recommendations.    2. Postoperative hypothyroidism  Chronic, controlled. Continue medication(s) as below.  - TSH WITH REFLEX TO FT4; " Future  - CBC WITH DIFFERENTIAL; Future    levothyroxine (SYNTHROID) 75 MCG Tab, Take 1 Tablet by mouth every morning on an empty stomach., Disp: 90 Tablet, Rfl: 3    3. Hyperlipidemia, unspecified hyperlipidemia type  4. Aortic atherosclerosis (HCC)  Chronic, controlled. Continue medication(s) as below.  Agree with discontinuation of aspirin due to concern for easy bruising/bleeding.  Discussed importance of ongoing exercise and healthy diet.  - Comp Metabolic Panel; Future  - Lipid Profile; Future    atorvastatin (LIPITOR) 20 MG Tab, Take 1 tablet by mouth every evening, Disp: 100 Tablet, Rfl: 3    5. Vitamin D insufficiency  6. Osteopenia of neck of left femur  Chronic osteopenia, not at threshold for treatment other than ensuring adequate vitamin D/calcium and fall risk reduction.  Due to trend in November.  Vitamin D insufficiency resolved with supplement, trend with annual labs.  - VITAMIN D,25 HYDROXY (DEFICIENCY); Future  - DS-BONE DENSITY STUDY (DEXA); Future    7. Need for vaccination  - Tdap =>6yo IM    Return in about 6 months (around 3/20/2025), or if symptoms worsen or fail to improve, for Annual Medicare Visit.    Please note that this dictation was created using voice recognition software. I have made every reasonable attempt to correct obvious errors, but I expect that there are errors of grammar and possibly content that I did not discover before finalizing the note.

## 2024-10-14 ENCOUNTER — PATIENT MESSAGE (OUTPATIENT)
Dept: MEDICAL GROUP | Facility: LAB | Age: 74
End: 2024-10-14
Payer: MEDICARE

## 2024-10-14 RX ORDER — ASPIRIN 81 MG/1
81 TABLET ORAL DAILY
COMMUNITY

## 2024-11-19 ENCOUNTER — HOSPITAL ENCOUNTER (OUTPATIENT)
Dept: RADIOLOGY | Facility: MEDICAL CENTER | Age: 74
End: 2024-11-19
Attending: STUDENT IN AN ORGANIZED HEALTH CARE EDUCATION/TRAINING PROGRAM
Payer: MEDICARE

## 2024-11-19 DIAGNOSIS — M85.852 OSTEOPENIA OF NECK OF LEFT FEMUR: ICD-10-CM

## 2024-11-19 PROCEDURE — 77080 DXA BONE DENSITY AXIAL: CPT

## 2024-12-13 DIAGNOSIS — G43.009 MIGRAINE WITHOUT AURA AND WITHOUT STATUS MIGRAINOSUS, NOT INTRACTABLE: ICD-10-CM

## 2024-12-13 RX ORDER — SUMATRIPTAN 50 MG/1
50 TABLET, FILM COATED ORAL
Qty: 9 TABLET | Refills: 3 | Status: SHIPPED | OUTPATIENT
Start: 2024-12-13

## 2024-12-13 NOTE — TELEPHONE ENCOUNTER
Received request via: Pharmacy    Was the patient seen in the last year in this department? Yes    Does the patient have an active prescription (recently filled or refills available) for medication(s) requested? yes    Pharmacy Name: To be filled at: [id number] - [place], [place] - [id number]      Does the patient have alf Plus and need 100-day supply? (This applies to ALL medications) this prescription is for migraine   Requested Prescriptions     Pending Prescriptions Disp Refills    SUMAtriptan (IMITREX) 50 MG Tab 9 Tablet 3     Sig: Take 1 Tablet by mouth 1 time a day as needed for Migraine (may repeat after 2 hours, max 100mg/day).

## 2025-01-09 ENCOUNTER — TELEPHONE (OUTPATIENT)
Dept: HEALTH INFORMATION MANAGEMENT | Facility: OTHER | Age: 75
End: 2025-01-09

## 2025-03-26 ENCOUNTER — HOSPITAL ENCOUNTER (OUTPATIENT)
Dept: RADIOLOGY | Facility: MEDICAL CENTER | Age: 75
End: 2025-03-26
Attending: STUDENT IN AN ORGANIZED HEALTH CARE EDUCATION/TRAINING PROGRAM
Payer: MEDICARE

## 2025-03-26 ENCOUNTER — RESULTS FOLLOW-UP (OUTPATIENT)
Dept: MEDICAL GROUP | Facility: LAB | Age: 75
End: 2025-03-26

## 2025-03-26 DIAGNOSIS — Z12.31 VISIT FOR SCREENING MAMMOGRAM: ICD-10-CM

## 2025-03-26 PROCEDURE — 77067 SCR MAMMO BI INCL CAD: CPT

## 2025-04-07 ENCOUNTER — APPOINTMENT (OUTPATIENT)
Dept: LAB | Facility: MEDICAL CENTER | Age: 75
End: 2025-04-07
Payer: MEDICARE

## 2025-04-09 ENCOUNTER — HOSPITAL ENCOUNTER (OUTPATIENT)
Dept: LAB | Facility: MEDICAL CENTER | Age: 75
End: 2025-04-09
Attending: STUDENT IN AN ORGANIZED HEALTH CARE EDUCATION/TRAINING PROGRAM
Payer: MEDICARE

## 2025-04-09 DIAGNOSIS — E78.5 HYPERLIPIDEMIA, UNSPECIFIED HYPERLIPIDEMIA TYPE: ICD-10-CM

## 2025-04-09 DIAGNOSIS — E89.0 POSTOPERATIVE HYPOTHYROIDISM: ICD-10-CM

## 2025-04-09 DIAGNOSIS — E55.9 VITAMIN D INSUFFICIENCY: ICD-10-CM

## 2025-04-09 DIAGNOSIS — M85.852 OSTEOPENIA OF NECK OF LEFT FEMUR: ICD-10-CM

## 2025-04-09 LAB
25(OH)D3 SERPL-MCNC: 62 NG/ML (ref 30–100)
ALBUMIN SERPL BCP-MCNC: 4.2 G/DL (ref 3.2–4.9)
ALBUMIN/GLOB SERPL: 1.7 G/DL
ALP SERPL-CCNC: 77 U/L (ref 30–99)
ALT SERPL-CCNC: 19 U/L (ref 2–50)
ANION GAP SERPL CALC-SCNC: 12 MMOL/L (ref 7–16)
AST SERPL-CCNC: 28 U/L (ref 12–45)
BASOPHILS # BLD AUTO: 0.5 % (ref 0–1.8)
BASOPHILS # BLD: 0.03 K/UL (ref 0–0.12)
BILIRUB SERPL-MCNC: 0.5 MG/DL (ref 0.1–1.5)
BUN SERPL-MCNC: 15 MG/DL (ref 8–22)
CALCIUM ALBUM COR SERPL-MCNC: 9 MG/DL (ref 8.5–10.5)
CALCIUM SERPL-MCNC: 9.2 MG/DL (ref 8.5–10.5)
CHLORIDE SERPL-SCNC: 104 MMOL/L (ref 96–112)
CHOLEST SERPL-MCNC: 194 MG/DL (ref 100–199)
CO2 SERPL-SCNC: 24 MMOL/L (ref 20–33)
CREAT SERPL-MCNC: 0.91 MG/DL (ref 0.5–1.4)
EOSINOPHIL # BLD AUTO: 0.15 K/UL (ref 0–0.51)
EOSINOPHIL NFR BLD: 2.5 % (ref 0–6.9)
ERYTHROCYTE [DISTWIDTH] IN BLOOD BY AUTOMATED COUNT: 42.1 FL (ref 35.9–50)
GFR SERPLBLD CREATININE-BSD FMLA CKD-EPI: 65 ML/MIN/1.73 M 2
GLOBULIN SER CALC-MCNC: 2.5 G/DL (ref 1.9–3.5)
GLUCOSE SERPL-MCNC: 85 MG/DL (ref 65–99)
HCT VFR BLD AUTO: 41.7 % (ref 37–47)
HDLC SERPL-MCNC: 86 MG/DL
HGB BLD-MCNC: 14.1 G/DL (ref 12–16)
IMM GRANULOCYTES # BLD AUTO: 0.02 K/UL (ref 0–0.11)
IMM GRANULOCYTES NFR BLD AUTO: 0.3 % (ref 0–0.9)
LDLC SERPL CALC-MCNC: 96 MG/DL
LYMPHOCYTES # BLD AUTO: 1.45 K/UL (ref 1–4.8)
LYMPHOCYTES NFR BLD: 24 % (ref 22–41)
MCH RBC QN AUTO: 31.8 PG (ref 27–33)
MCHC RBC AUTO-ENTMCNC: 33.8 G/DL (ref 32.2–35.5)
MCV RBC AUTO: 94.1 FL (ref 81.4–97.8)
MONOCYTES # BLD AUTO: 0.4 K/UL (ref 0–0.85)
MONOCYTES NFR BLD AUTO: 6.6 % (ref 0–13.4)
NEUTROPHILS # BLD AUTO: 3.98 K/UL (ref 1.82–7.42)
NEUTROPHILS NFR BLD: 66.1 % (ref 44–72)
NRBC # BLD AUTO: 0 K/UL
NRBC BLD-RTO: 0 /100 WBC (ref 0–0.2)
PLATELET # BLD AUTO: 243 K/UL (ref 164–446)
PMV BLD AUTO: 9.3 FL (ref 9–12.9)
POTASSIUM SERPL-SCNC: 4.5 MMOL/L (ref 3.6–5.5)
PROT SERPL-MCNC: 6.7 G/DL (ref 6–8.2)
RBC # BLD AUTO: 4.43 M/UL (ref 4.2–5.4)
SODIUM SERPL-SCNC: 140 MMOL/L (ref 135–145)
TRIGL SERPL-MCNC: 58 MG/DL (ref 0–149)
TSH SERPL DL<=0.005 MIU/L-ACNC: 1.15 UIU/ML (ref 0.38–5.33)
WBC # BLD AUTO: 6 K/UL (ref 4.8–10.8)

## 2025-04-09 PROCEDURE — 36415 COLL VENOUS BLD VENIPUNCTURE: CPT

## 2025-04-09 PROCEDURE — 80061 LIPID PANEL: CPT

## 2025-04-09 PROCEDURE — 82306 VITAMIN D 25 HYDROXY: CPT

## 2025-04-09 PROCEDURE — 84443 ASSAY THYROID STIM HORMONE: CPT

## 2025-04-09 PROCEDURE — 85025 COMPLETE CBC W/AUTO DIFF WBC: CPT

## 2025-04-09 PROCEDURE — 80053 COMPREHEN METABOLIC PANEL: CPT

## 2025-04-10 ENCOUNTER — APPOINTMENT (OUTPATIENT)
Dept: LAB | Facility: MEDICAL CENTER | Age: 75
End: 2025-04-10
Payer: MEDICARE

## 2025-04-15 ENCOUNTER — RESULTS FOLLOW-UP (OUTPATIENT)
Dept: MEDICAL GROUP | Facility: LAB | Age: 75
End: 2025-04-15

## 2025-04-16 ENCOUNTER — OFFICE VISIT (OUTPATIENT)
Dept: MEDICAL GROUP | Facility: LAB | Age: 75
End: 2025-04-16
Payer: MEDICARE

## 2025-04-16 VITALS
SYSTOLIC BLOOD PRESSURE: 138 MMHG | HEART RATE: 67 BPM | OXYGEN SATURATION: 94 % | HEIGHT: 63 IN | WEIGHT: 119.6 LBS | RESPIRATION RATE: 16 BRPM | BODY MASS INDEX: 21.19 KG/M2 | TEMPERATURE: 96.9 F | DIASTOLIC BLOOD PRESSURE: 68 MMHG

## 2025-04-16 DIAGNOSIS — R03.0 ELEVATED BLOOD PRESSURE READING WITHOUT DIAGNOSIS OF HYPERTENSION: ICD-10-CM

## 2025-04-16 DIAGNOSIS — H61.22 IMPACTED CERUMEN OF LEFT EAR: ICD-10-CM

## 2025-04-16 DIAGNOSIS — E78.5 HYPERLIPIDEMIA, UNSPECIFIED HYPERLIPIDEMIA TYPE: ICD-10-CM

## 2025-04-16 DIAGNOSIS — H91.90 SUBJECTIVE HEARING LOSS: ICD-10-CM

## 2025-04-16 DIAGNOSIS — E89.0 POSTOPERATIVE HYPOTHYROIDISM: ICD-10-CM

## 2025-04-16 DIAGNOSIS — I70.0 AORTIC ATHEROSCLEROSIS (HCC): ICD-10-CM

## 2025-04-16 DIAGNOSIS — Z00.00 MEDICARE ANNUAL WELLNESS VISIT, SUBSEQUENT: Primary | ICD-10-CM

## 2025-04-16 PROCEDURE — 3078F DIAST BP <80 MM HG: CPT | Performed by: STUDENT IN AN ORGANIZED HEALTH CARE EDUCATION/TRAINING PROGRAM

## 2025-04-16 PROCEDURE — 3074F SYST BP LT 130 MM HG: CPT | Performed by: STUDENT IN AN ORGANIZED HEALTH CARE EDUCATION/TRAINING PROGRAM

## 2025-04-16 PROCEDURE — G0439 PPPS, SUBSEQ VISIT: HCPCS | Performed by: STUDENT IN AN ORGANIZED HEALTH CARE EDUCATION/TRAINING PROGRAM

## 2025-04-16 ASSESSMENT — ENCOUNTER SYMPTOMS: GENERAL WELL-BEING: GOOD

## 2025-04-16 ASSESSMENT — ACTIVITIES OF DAILY LIVING (ADL): BATHING_REQUIRES_ASSISTANCE: 0

## 2025-04-16 ASSESSMENT — PATIENT HEALTH QUESTIONNAIRE - PHQ9: CLINICAL INTERPRETATION OF PHQ2 SCORE: 0

## 2025-04-16 ASSESSMENT — FIBROSIS 4 INDEX: FIB4 SCORE: 2.01

## 2025-04-16 NOTE — PATIENT INSTRUCTIONS
Ideal blood pressure <130/80  Severe range >180/120, with symptoms=ER    Debrox for wax    Aim for goal of 150 minutes a week of moderate intensity exercise (ex 30 minutes 5 times a week)

## 2025-04-16 NOTE — PROGRESS NOTES
Chief Complaint   Patient presents with    Medicare Annual Wellness     B/P concern       HPI:  Radha Land is a 75 y.o. here for Medicare Annual Wellness Visit     Verbal consent was acquired by the patient to use [employer] (formerly known as [employer]) ambient listening note generation during this visit Yes     History of Present Illness  The patient is a 75-year-old female who presents for her Medicare annual wellness visit and concerns regarding high blood pressure.    She has been monitoring her blood pressure at home for the past few weeks, with readings consistently in the range of 130 to 137. She brought in her blood pressure machine today to cross check. She admits to a high salt intake and suspects this may be contributing to her elevated blood pressure. No symptoms such as chest pain, difficulty breathing, lightheadedness, headaches, or changes in energy levels are reported. An active lifestyle is maintained, and she engages in regular exercise. Recent stressors include home renovations and travel planning. Ibuprofen is not used frequently. Her diet includes a significant amount of pretzels. She consumes one beer daily and reports no drug use. Sleep apnea is not suspected, and no symptoms have been observed by her . She feels refreshed upon waking.    Tinnitus is experienced, predominantly in the right ear, and a greater degree of hearing loss is perceived in the same ear. A hearing test was conducted several years ago without significant findings. Q-tips are not used for ear cleaning, and ear plugs are used while swimming.    No gastrointestinal issues are reported. Her mother had a rectal tumor and she desires to continue routine colonoscopy.    Patient Active Problem List    Diagnosis Date Noted    Elevated blood pressure reading without diagnosis of hypertension 04/16/2025    Tinnitus of right ear 04/20/2023    Subjective hearing loss 04/20/2023    Aortic atherosclerosis (HCC) 04/20/2022    Hyperlipidemia  02/04/2022    Primary insomnia 02/04/2022    Age-related cataract of both eyes 02/04/2022    Nocturnal leg cramps 02/04/2022    BMI 21.0-21.9, adult 06/23/2021    Primary osteoarthritis of left knee 01/24/2018    Vitamin D insufficiency 06/13/2017    Recurrent major depressive disorder, in full remission (HCC) 11/14/2016    Migraine without aura and without status migrainosus, not intractable 06/01/2015    Osteopenia of neck of left femur 06/01/2015    Postoperative hypothyroidism 09/02/2014       Current Outpatient Medications   Medication Sig Dispense Refill    SUMAtriptan (IMITREX) 50 MG Tab Take 1 Tablet by mouth 1 time a day as needed for Migraine (may repeat after 2 hours, max 100mg/day). 9 Tablet 3    aspirin 81 MG EC tablet Take 81 mg by mouth every day.      levothyroxine (SYNTHROID) 75 MCG Tab Take 1 Tablet by mouth every morning on an empty stomach. 90 Tablet 3    atorvastatin (LIPITOR) 20 MG Tab Take 1 tablet by mouth every evening 100 Tablet 3    Ascorbic Acid (VITAMIN C PO) Take  by mouth.      VITAMIN D PO Take 2,000 Int'l Units/day by mouth every day.      CALCIUM-VITAMIN D PO Take  by mouth.      Cyanocobalamin (VITAMIN B12 PO) Take  by mouth.      MAGNESIUM PO Take  by mouth.      POTASSIUM PO Take  by mouth.      ibuprofen (MOTRIN) 200 MG Tab Take 200 mg by mouth 1 time a day as needed.      Psyllium (METAMUCIL PO) Take  by mouth every day.       No current facility-administered medications for this visit.          Current supplements as per medication list.     Allergies: Patient has no known allergies.    Current social contact/activities: Read, Walk, Golf, , goes to the [employer], travels.     She  reports that she quit smoking about 40 years ago. Her smoking use included cigarettes. She started smoking about 56 years ago. She has a 8 pack-year smoking history. She has never used smokeless tobacco. She reports current alcohol use of about 4.2 oz of alcohol per week. She reports  that she does not use drugs.  Counseling given: Not Answered    ROS:    Gait: Uses no assistive device  Ostomy: No  Other tubes: No  Amputations: No  Chronic oxygen use: No  Last eye exam: 1/2025  Wears hearing aids: No   : Denies any urinary leakage during the last 6 months    Screening:    Depression Screening  Little interest or pleasure in doing things?  0 - not at all  Feeling down, depressed , or hopeless? 0 - not at all  Patient Health Questionnaire Score: 0     If depressive symptoms identified deferred to follow up visit unless specifically addressed in assessment and plan.    Interpretation of PHQ-9 Total Score   Score Severity   1-4 No Depression   5-9 Mild Depression   10-14 Moderate Depression   15-19 Moderately Severe Depression   20-27 Severe Depression    Screening for Cognitive Impairment  Do you or any of your friends or family members have any concern about your memory? No  Three Minute Recall (Village, Kitchen, Baby) 3/3    Cristi clock face with all 12 numbers and set the hands to show 10 minutes past 11.  Yes    Cognitive concerns identified deferred for follow up unless specifically addressed in assessment and plan.    Fall Risk Assessment  Has the patient had two or more falls in the last year or any fall with injury in the last year?  No    Safety Assessment  Do you always wear your seatbelt?  Yes  Any changes to home needed to function safely? No  Difficulty hearing.  Yes, has tinnitus (high pitched right ear).  Patient counseled about all safety risks that were identified.    Functional Assessment ADLs  Are there any barriers preventing you from cooking for yourself or meeting nutritional needs?  No.    Are there any barriers preventing you from driving safely or obtaining transportation?  No.    Are there any barriers preventing you from using a telephone or calling for help?  No    Are there any barriers preventing you from shopping?  No.    Are there any barriers preventing you from  taking care of your own finances?  No    Are there any barriers preventing you from managing your medications?  No    Are there any barriers preventing you from showering, bathing or dressing yourself? No    Are there any barriers preventing you from doing housework or laundry? No  Are there any barriers preventing you from using the toilet?No  Are you currently engaging in any exercise or physical activity?  Yes.      Self-Assessment of Health  What is your perception of your health? Good    Do you sleep more than six hours a night? Yes    In the past 7 days, how much did pain keep you from doing your normal work? None    Do you spend quality time with family or friends (virtually or in person)? Yes    Do you usually eat a heart healthy diet that constists of a variety of fruits, vegetables, whole grains and fiber? Yes    Do you eat foods high in fat and/or Fast Food more than three times per week? No    How concerned are you that your medical conditions are not being well managed? Not at all    Are you worried that in the next 2 months, you may not have stable housing that you own, rent, or stay in as part of a household? No      Advance Care Planning  Do you have an Advance Directive, Living Will, Durable Power of , or POLST? Yes  Advance Directive   Durable Power of    is on file      Health Maintenance Summary            Upcoming       Mammogram (Yearly) Next due on 3/26/2026      03/26/2025  MA-SCREENING MAMMO BILAT W/TOMOSYNTHESIS W/CAD    03/25/2024  MA-SCREENING MAMMO BILAT W/TOMOSYNTHESIS W/CAD    03/21/2023  MA-SCREENING MAMMO BILAT W/TOMOSYNTHESIS W/CAD    03/14/2022  MA-SCREENING MAMMO BILAT W/TOMOSYNTHESIS W/CAD    02/23/2021  MA-SCREENING MAMMO BILAT W/TOMOSYNTHESIS W/CAD     Only the first 5 history entries have been loaded, but more history exists.            Annual Wellness Visit (Yearly) Next due on 4/16/2026 04/16/2025  Visit Dx: Medicare annual wellness visit, subsequent     04/16/2025  Level of Service: [place] ANNUAL WELLNESS VISIT-INCLUDES PPPS SUBSEQUE*    03/19/2024  Level of Service: PREVENTIVE VISIT,EST,65 & OVER    03/18/2024  Level of Service: [place] ANNUAL WELLNESS VISIT-INCLUDES PPPS SUBSEQUE*    04/20/2023  Level of Service: [place] PREVENTIVE VISIT,EST,65 & OVER      Only the first 5 history entries have been loaded, but more history exists.              Bone Density Scan (Every 2 Years) Next due on 11/19/2026 11/19/2024  DS-BONE DENSITY STUDY (DEXA)    11/17/2022  DS-BONE DENSITY STUDY (DEXA)    11/16/2020  DS-BONE DENSITY STUDY (DEXA)    11/11/2015  DS-BONE DENSITY STUDY (DEXA)    07/14/2010  DS-BONE DENSITY STUDY (DEXA)      Only the first 5 history entries have been loaded, but more history exists.              Colorectal Cancer Screening (Colonoscopy - Preferred) Next due on 6/23/2027 06/23/2017  REFERRAL TO GI FOR COLONOSCOPY    09/08/2014  OCCULT BLOOD FECES IMMUNOASSAY    11/15/2007  AMB REFERRAL TO GI FOR COLONOSCOPY              IMM DTaP/Tdap/Td Vaccine (3 - Td or Tdap) Next due on 9/17/2034 09/17/2024  Imm Admin: Tdap Vaccine    09/02/2014  Imm Admin: Tdap Vaccine                      Completed or No Longer Recommended       Influenza Vaccine (Series Information) Completed      11/06/2024  Imm Admin: Influenza, unspecified formulation    10/20/2023  Imm Admin: Influenza Vaccine Adult HD    10/24/2022  Imm Admin: Influenza, Unspecified - HISTORICAL DATA    10/25/2021  Imm Admin: Influenza, Unspecified - HISTORICAL DATA    10/24/2020  Imm Admin: Influenza Vaccine Adult HD      Only the first 5 history entries have been loaded, but more history exists.              Zoster (Shingles) Vaccines (Series Information) Completed      12/11/2018  Imm Admin: Zoster Vaccine Recombinant (RZV) (SHINGRIX)    09/25/2018  Imm Admin: Zoster Vaccine Recombinant (RZV) (SHINGRIX)    05/11/2012  Imm Admin: Zoster Vaccine Live (ZVL) (Zostavax) - HISTORICAL DATA              COVID-19  Vaccine (Series Information) Completed      03/26/2025  Imm Admin: COVID-19, mRNA, LNP-S, PF, leann-sucrose, 30 mcg/0.3 mL    09/07/2024  Imm Admin: Covid-19 Mrna (Spikevax) Moderna 12+ Years    10/20/2023  Imm Admin: Comirnaty (Covid-19 Vaccine, Mrna, [id number] Formula)    05/06/2023  Imm Admin: PFIZER BIVALENT SARS-COV-2 VACCINE (12+)    09/17/2022  Imm Admin: PFIZER BIVALENT SARS-COV-2 VACCINE (12+)      Only the first 5 history entries have been loaded, but more history exists.              Hepatitis C Screening  Completed      06/02/2017  Hepatitis C Antibody component of HEP C VIRUS ANTIBODY    07/29/2013  Hepatitis C Antibody component of HEP C VIRUS ANTIBODY              Pneumococcal Vaccine: 50+ Years (Series Information) Completed      03/02/2017  Imm Admin: Pneumococcal Conjugate Vaccine (Prevnar/PCV-13)    09/02/2014  Imm Admin: Pneumococcal polysaccharide vaccine (PPSV-23)              Hepatitis A Vaccine (Hep A) (Series Information) Aged Out      No completion history exists for this topic.              HPV Vaccines (Series Information) Aged Out     No completion history exists for this topic.              Polio Vaccine (Inactivated Polio) (Series Information) Aged Out     No completion history exists for this topic.              Meningococcal Immunization (Series Information) Aged Out     No completion history exists for this topic.              Cervical Cancer Screening  Discontinued        Frequency changed to Never automatically (Topic No Longer Applies)    12/20/2016  PATHOLOGY GYN SPECIMEN    12/20/2016  THINPREP PAP WITH HPV              Hepatitis B Vaccine (Hep B)  Discontinued     No completion history exists for this topic.                          Patient Care Team:  Jo Cardona D.O. as PCP - General (Family Medicine)  Johnny You M.D. as Consulting Physician (Gastroenterology)  SANDOVAL Morales as Mid Level Provider (Dermatology)  Cristal Allen (Inactive)    Social History      Tobacco Use    Smoking status: Former     Current packs/day: 0.00     Average packs/day: 0.5 packs/day for 16.0 years (8.0 ttl pk-yrs)     Types: Cigarettes     Start date: 1968     Quit date: 1984     Years since quittin.8    Smokeless tobacco: Never   Vaping Use    Vaping status: Never Used   Substance Use Topics    Alcohol use: Yes     Alcohol/week: 4.2 oz     Types: 7 Cans of beer per week     Comment: 1 glass of beer/wine a day    Drug use: No     Family History   Problem Relation Age of Onset    Hyperlipidemia Mother     Thyroid Mother         Hypothyroid    Cancer Mother 90        uterine cancer    Diabetes Father     Heart Failure Father     Heart Disease Father         Rhumatic vavular heart disease    Other Father         rheumatic fever    Diabetes Sister     Cancer Other         breast    Diabetes Brother     Cancer Maternal Grandmother         uterine     She  has a past medical history of Chalazion of left upper eyelid (2023), Chronic right shoulder pain (2022), Disorder of arteries and arterioles (HCC) (2021), History of squamous cell carcinoma of skin (2022), History of Sweet syndrome (2014), History of vitamin D deficiency (2022), Hyperlipidemia, Impingement syndrome of right shoulder (10/28/2021), Migraine, Peripheral arterial disease (HCC) (2023), Postoperative pain (2021), SLAP lesion of right shoulder (10/28/2021), Strain of tendon of right rotator cuff (10/28/2021), Thyroid disease, and Vitamin D insufficiency (2017).   Past Surgical History:   Procedure Laterality Date    PB SHLDR ARTHROSCOP,SURG,W/ROTAT CUFF REPB Right 2021    Procedure: RIGHT SHOULDER ARTHROSCOPY DEBRIDEMENT, RIGHT ROTATOR CUFF REPAIR, RIGHT SUBACROMIAL DECOMPRESSION, RIGHT DISTAL CLAVICLE EXCISION, REPAIRS AS INDICATED;  Surgeon: Vish Garcia M.D.;  Location: [facility];  Service: Orthopedics    HEMORRHOIDECTOMY  2017     "Procedure: HEMORRHOIDECTOMY- ARTERY LIGATION, RECTAL ANO REPAIR;  Surgeon: Aleksandr Quinteros M.D.;  Location: [facility];  Service: General    ABDOMINAL HYSTERECTOMY TOTAL      Fibroids //     DILATION AND CURETTAGE          OTHER      hemrroidectomy    OTHER ORTHOPEDIC SURGERY      surgery to big toe    THYROIDECTOMY      partial       Exam:   /68 (BP Location: Right arm, Patient Position: Sitting, BP Cuff Size: Adult)   Pulse 67   Temp 36.1 °C (96.9 °F) (Temporal)   Resp 16   Ht 1.6 m (5' 3\")   Wt 54.3 kg (119 lb 9.6 oz)   SpO2 94%  Body mass index is 21.19 kg/m².    Dentition good.  Physical Exam  Vitals reviewed.   Constitutional:       General: She is not in acute distress.     Appearance: Normal appearance. She is not ill-appearing.   HENT:      Head: Normocephalic and atraumatic.      Right Ear: Tympanic membrane, ear canal and external ear normal. Decreased hearing (to finger rub) noted. There is no impacted cerumen.      Left Ear: No decreased hearing noted. There is impacted cerumen.      Nose: Nose normal.      Mouth/Throat:      Mouth: Mucous membranes are moist.   Eyes:      Conjunctiva/sclera: Conjunctivae normal.   Cardiovascular:      Rate and Rhythm: Normal rate and regular rhythm.      Heart sounds: Normal heart sounds. No murmur heard.  Pulmonary:      Effort: Pulmonary effort is normal. No respiratory distress.      Breath sounds: Normal breath sounds. No stridor. No wheezing, rhonchi or rales.   Musculoskeletal:      Cervical back: Normal range of motion and neck supple. No rigidity or tenderness.      Right lower leg: No edema.      Left lower leg: No edema.   Neurological:      General: No focal deficit present.      Mental Status: She is alert and oriented to person, place, and time.   Psychiatric:         Mood and Affect: Mood normal.         Behavior: Behavior normal.         Thought Content: Thought content normal.       Labs: Reviewed from " 4/9/2025    Assessment and Plan. The following treatment and monitoring plan is recommended:      1. Medicare annual wellness visit, subsequent  Services suggested: No services needed at this time  Health Care Screening: Age-appropriate preventive services recommended by USPTF and ACIP covered by Medicare were discussed today. Services ordered if indicated and agreed upon by the patient.  Immunizations UTD.  Referrals offered: Community-based lifestyle interventions to reduce health risks and promote self-management and wellness, fall prevention, nutrition, physical activity, tobacco-use cessation, weight loss, and mental health services as per orders if indicated.      Discussion today about general wellness and lifestyle habits:    Prevent falls and reduce trip hazards; Cautioned about securing or removing rugs.  Have a working fire alarm, fire extinguisher and carbon monoxide detector.  Engage in regular physical activity and social activities.    2. Hyperlipidemia, unspecified hyperlipidemia type  3. Aortic atherosclerosis (HCC)  Chronic, well-controlled.  Continue atorvastatin 20 mg nightly and 81 mg aspirin daily for primary prevention.    4. Postoperative hypothyroidism  Chronic, well-controlled.  Continue levothyroxine 75 mcg daily.    5. Subjective hearing loss  6. Impacted cerumen of left ear  Chronic subjective hearing loss on the right, declines audiogram/referral at this time but does have [employer] if she would like to have a free hearing test.  She does have some asymptomatic cerumen impaction of the left ear, advised Debrox and warm water.  Gave return precautions.    7. Elevated blood pressure reading without diagnosis of hypertension  New issue, BP at home in the 130s often. Discussed ideal ranges/return precautions and ways to improve with lifestyle choices. Follow up in early June (sooner if needed) to discuss if lifestyle interventions are ineffective and need to start medication.    Follow-up:  Return in 4 weeks (on 5/14/2025), or if symptoms worsen or fail to improve, for Blood pressure.

## 2025-04-22 DIAGNOSIS — E78.00 PURE HYPERCHOLESTEROLEMIA: ICD-10-CM

## 2025-04-22 DIAGNOSIS — I70.0 AORTIC ATHEROSCLEROSIS (HCC): ICD-10-CM

## 2025-04-22 RX ORDER — ATORVASTATIN CALCIUM 20 MG/1
20 TABLET, FILM COATED ORAL EVERY EVENING
Qty: 100 TABLET | Refills: 3 | Status: SHIPPED | OUTPATIENT
Start: 2025-04-22

## 2025-04-22 NOTE — TELEPHONE ENCOUNTER
Received request via: Pharmacy    Was the patient seen in the last year in this department? Yes    Does the patient have an active prescription (recently filled or refills available) for medication(s) requested? No    Pharmacy Name: [facility]    Does the patient have skilled nursing Plus and need 100-day supply? (This applies to ALL medications) Yes, quantity updated to 100 days

## 2025-06-04 ENCOUNTER — OFFICE VISIT (OUTPATIENT)
Dept: MEDICAL GROUP | Facility: LAB | Age: 75
End: 2025-06-04
Payer: MEDICARE

## 2025-06-04 VITALS
WEIGHT: 118.3 LBS | BODY MASS INDEX: 20.96 KG/M2 | HEART RATE: 62 BPM | RESPIRATION RATE: 16 BRPM | HEIGHT: 63 IN | OXYGEN SATURATION: 96 % | TEMPERATURE: 97.4 F | DIASTOLIC BLOOD PRESSURE: 68 MMHG | SYSTOLIC BLOOD PRESSURE: 122 MMHG

## 2025-06-04 DIAGNOSIS — Z01.30 BLOOD PRESSURE CHECK: Primary | ICD-10-CM

## 2025-06-04 PROCEDURE — 3078F DIAST BP <80 MM HG: CPT | Performed by: STUDENT IN AN ORGANIZED HEALTH CARE EDUCATION/TRAINING PROGRAM

## 2025-06-04 PROCEDURE — 99212 OFFICE O/P EST SF 10 MIN: CPT | Performed by: STUDENT IN AN ORGANIZED HEALTH CARE EDUCATION/TRAINING PROGRAM

## 2025-06-04 PROCEDURE — 3074F SYST BP LT 130 MM HG: CPT | Performed by: STUDENT IN AN ORGANIZED HEALTH CARE EDUCATION/TRAINING PROGRAM

## 2025-06-04 ASSESSMENT — ENCOUNTER SYMPTOMS
ABDOMINAL PAIN: 0
SHORTNESS OF BREATH: 0
WEIGHT LOSS: 0
FEVER: 0
COUGH: 0
VOMITING: 0
NAUSEA: 0
DIARRHEA: 0
CHILLS: 0

## 2025-06-04 ASSESSMENT — FIBROSIS 4 INDEX: FIB4 SCORE: 2.01

## 2025-06-04 NOTE — PROGRESS NOTES
"Verbal consent was acquired by the patient to use [employer] ambient listening note generation during this visit Yes.    Subjective:     Chief Complaint   Patient presents with    Hypertension Follow-up       HPI:     History of Present Illness  The patient is a 75-year-old female who presents for a blood pressure check.    She has been diligently monitoring her blood pressure at home, with initial readings ranging from 139 to 142 mmHg systolic. Typically, she waits for a duration of 10 minutes before taking the measurement. Recently, she has implemented dietary modifications, specifically reducing her salt intake, which she believes has contributed to the improvement in her blood pressure. Brings in a log for my review.    She reports traveling to [place] in May 2025, where she participated in a river cruise and visited various historical sites, including [facility] and [facility].     She plans to travel to [place] over the summer to visit family and attend a concert featuring Holden Poon and Luciano Crowell.    Review of Systems   Constitutional:  Negative for chills, fever, malaise/fatigue and weight loss.   Respiratory:  Negative for cough and shortness of breath.    Cardiovascular:  Negative for chest pain.   Gastrointestinal:  Negative for abdominal pain, diarrhea, nausea and vomiting.   Skin:  Negative for rash.     Objective:     Exam:  /68 (BP Location: Left arm, Patient Position: Sitting, BP Cuff Size: Adult)   Pulse 62   Temp 36.3 °C (97.4 °F) (Temporal)   Resp 16   Ht 1.6 m (5' 3\")   Wt 53.7 kg (118 lb 4.8 oz)   SpO2 96%   BMI 20.96 kg/m²  Body mass index is 20.96 kg/m².    Physical Exam  Vitals reviewed.   Constitutional:       General: She is not in acute distress.     Appearance: Normal appearance. She is not ill-appearing.   HENT:      Head: Normocephalic and atraumatic.      Nose: Nose normal.      Mouth/Throat:      Mouth: Mucous membranes are moist. "   Pulmonary:      Effort: Pulmonary effort is normal.   Skin:     General: Skin is warm and dry.   Neurological:      General: No focal deficit present.      Mental Status: She is alert and oriented to person, place, and time.   Psychiatric:         Mood and Affect: Mood normal.         Behavior: Behavior normal.         Thought Content: Thought content normal.       Reviewed most recent/relevant labs.    Assessment & Plan:     75 y.o. female with the following -     1. Blood pressure check  Blood pressure initially and on repeat very well-controlled.  Reviewed home log which show good blood pressure ranges.  - Advised to continue monitoring blood pressure at home and maintain a low-sodium diet.    I spent a total of 11 minutes with record review, exam, communication with the patient, and documentation of this encounter.    Return in about 6 months (around 11/20/2025), or if symptoms worsen or fail to improve, for Annual (not AWV).    Please note that this dictation was created using voice recognition software. I have made every reasonable attempt to correct obvious errors, but I expect that there are errors of grammar and possibly content that I did not discover before finalizing the note.

## 2025-06-17 ASSESSMENT — PATIENT HEALTH QUESTIONNAIRE - PHQ9
2. FEELING DOWN, DEPRESSED, IRRITABLE, OR HOPELESS: NOT AT ALL
CLINICAL INTERPRETATION OF PHQ2 SCORE: 0
1. LITTLE INTEREST OR PLEASURE IN DOING THINGS: NOT AT ALL

## 2025-06-17 ASSESSMENT — ENCOUNTER SYMPTOMS: GENERAL WELL-BEING: GOOD

## 2025-06-17 ASSESSMENT — ACTIVITIES OF DAILY LIVING (ADL): BATHING_REQUIRES_ASSISTANCE: 0

## 2025-06-18 ENCOUNTER — OFFICE VISIT (OUTPATIENT)
Dept: FAMILY PLANNING/WOMEN'S HEALTH CLINIC | Facility: PHYSICIAN GROUP | Age: 75
End: 2025-06-18
Payer: MEDICARE

## 2025-06-18 VITALS
OXYGEN SATURATION: 95 % | SYSTOLIC BLOOD PRESSURE: 116 MMHG | BODY MASS INDEX: 21.05 KG/M2 | WEIGHT: 118.8 LBS | DIASTOLIC BLOOD PRESSURE: 72 MMHG | HEART RATE: 66 BPM | HEIGHT: 63 IN

## 2025-06-18 DIAGNOSIS — M85.852 OSTEOPENIA OF NECK OF LEFT FEMUR: ICD-10-CM

## 2025-06-18 DIAGNOSIS — E89.0 POSTOPERATIVE HYPOTHYROIDISM: ICD-10-CM

## 2025-06-18 DIAGNOSIS — E55.9 VITAMIN D INSUFFICIENCY: ICD-10-CM

## 2025-06-18 DIAGNOSIS — E78.5 HYPERLIPIDEMIA, UNSPECIFIED HYPERLIPIDEMIA TYPE: ICD-10-CM

## 2025-06-18 DIAGNOSIS — G43.009 MIGRAINE WITHOUT AURA AND WITHOUT STATUS MIGRAINOSUS, NOT INTRACTABLE: ICD-10-CM

## 2025-06-18 DIAGNOSIS — F33.42 RECURRENT MAJOR DEPRESSIVE DISORDER, IN FULL REMISSION (HCC): Primary | ICD-10-CM

## 2025-06-18 PROCEDURE — 1126F AMNT PAIN NOTED NONE PRSNT: CPT

## 2025-06-18 PROCEDURE — G0439 PPPS, SUBSEQ VISIT: HCPCS

## 2025-06-18 PROCEDURE — 3074F SYST BP LT 130 MM HG: CPT

## 2025-06-18 PROCEDURE — 3078F DIAST BP <80 MM HG: CPT

## 2025-06-18 SDOH — ECONOMIC STABILITY: TRANSPORTATION INSECURITY: IN THE PAST 12 MONTHS, HAS LACK OF TRANSPORTATION KEPT YOU FROM MEDICAL APPOINTMENTS OR FROM GETTING MEDICATIONS?: NO

## 2025-06-18 SDOH — ECONOMIC STABILITY: HOUSING INSECURITY: AT ANY TIME IN THE PAST 12 MONTHS, WERE YOU HOMELESS OR LIVING IN A SHELTER (INCLUDING NOW)?: NO

## 2025-06-18 SDOH — ECONOMIC STABILITY: FOOD INSECURITY: HOW HARD IS IT FOR YOU TO PAY FOR THE VERY BASICS LIKE FOOD, HOUSING, MEDICAL CARE, AND HEATING?: NOT HARD AT ALL

## 2025-06-18 SDOH — ECONOMIC STABILITY: HOUSING INSECURITY: IN THE LAST 12 MONTHS, WAS THERE A TIME WHEN YOU WERE NOT ABLE TO PAY THE MORTGAGE OR RENT ON TIME?: NO

## 2025-06-18 SDOH — ECONOMIC STABILITY: FOOD INSECURITY: WITHIN THE PAST 12 MONTHS, THE FOOD YOU BOUGHT JUST DIDN'T LAST AND YOU DIDN'T HAVE MONEY TO GET MORE.: NEVER TRUE

## 2025-06-18 SDOH — ECONOMIC STABILITY: FOOD INSECURITY: WITHIN THE PAST 12 MONTHS, YOU WORRIED THAT YOUR FOOD WOULD RUN OUT BEFORE YOU GOT THE MONEY TO BUY MORE.: NEVER TRUE

## 2025-06-18 ASSESSMENT — FIBROSIS 4 INDEX: FIB4 SCORE: 2.01

## 2025-06-18 ASSESSMENT — ACTIVITIES OF DAILY LIVING (ADL): LACK_OF_TRANSPORTATION: NO

## 2025-06-18 ASSESSMENT — PAIN SCALES - GENERAL: PAINLEVEL_OUTOF10: NO PAIN

## 2025-06-18 NOTE — ASSESSMENT & PLAN NOTE
Chronic, stable. The patient reports that that sumatriptan is very helpful at preventing her migraines. Follow-up at least annually.

## 2025-06-18 NOTE — ASSESSMENT & PLAN NOTE
Chronic, stable. The patient denies any symptoms at this time. Continue with current defined treatment plan: levothyroxine (SYNTHROID) 75 MCG Tab . Follow-up at least annually.

## 2025-06-18 NOTE — ASSESSMENT & PLAN NOTE
Latest Reference Range & Units 04/09/25 06:48   Cholesterol,Tot 100 - 199 mg/dL 194   Triglycerides 0 - 149 mg/dL 58   HDL >=40 mg/dL 86   LDL <100 mg/dL 96       Chronic, stable. The patient denies any side effects from the current medication. Continue with current defined treatment plan: atorvastatin (LIPITOR) 20 MG Tab . Follow-up at least annually.

## 2025-06-18 NOTE — PROGRESS NOTES
Comprehensive Health Assessment Program     Radha Land is a 75 y.o. here for her comprehensive health assessment.    Patient Active Problem List    Diagnosis Date Noted    Elevated blood pressure reading without diagnosis of hypertension 04/16/2025    Tinnitus of right ear 04/20/2023    Subjective hearing loss 04/20/2023    Aortic atherosclerosis (HCC) 04/20/2022    Hyperlipidemia 02/04/2022    Primary insomnia 02/04/2022    Age-related cataract of both eyes 02/04/2022    Nocturnal leg cramps 02/04/2022    BMI 21.0-21.9, adult 06/23/2021    Primary osteoarthritis of left knee 01/24/2018    Vitamin D insufficiency 06/13/2017    Recurrent major depressive disorder, in full remission (HCC) 11/14/2016    Migraine without aura and without status migrainosus, not intractable 06/01/2015    Osteopenia of neck of left femur 06/01/2015    Postoperative hypothyroidism 09/02/2014       Current Medications[1]       Current supplements as per medication list.     Allergies:   Patient has no known allergies.  Social History[2]  Family History   Problem Relation Age of Onset    Hyperlipidemia Mother     Thyroid Mother         Hypothyroid    Cancer Mother 90        uterine cancer    Diabetes Father     Heart Failure Father     Heart Disease Father         Rhumatic vavular heart disease    Other Father         rheumatic fever    Diabetes Sister     Cancer Other         breast    Diabetes Brother     Cancer Maternal Grandmother         uterine     Radha  has a past medical history of Chalazion of left upper eyelid (04/20/2023), Chronic right shoulder pain (2/4/2022), Disorder of arteries and arterioles (HCC) (06/22/2021), History of squamous cell carcinoma of skin (11/30/2022), 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   Past Surgical History[3]    Screening:  In the last six months have you experienced any leakage of urine? No    Depression Screening  Little interest or pleasure in doing things?  0 - not at all  Feeling down, depressed , or hopeless? 0 - not at all  Patient Health Questionnaire Score: 0     If depressive symptoms identified deferred to follow up visit unless specifically addressed in assessment and plan.    Interpretation of PHQ-9 Total Score   Score Severity   1-4 No Depression   5-9 Mild Depression   10-14 Moderate Depression   15-19 Moderately Severe Depression   20-27 Severe Depression    Screening for Cognitive Impairment  Do you or any of your friends or family members have any concern about your memory? No  Three Minute Recall (Village, Kitchen, Baby) 3/3    Cristi clock face with all 12 numbers and set the hands to show 10 minutes past 11.  Yes 5/5  Cognitive concerns identified deferred for follow up unless specifically addressed in assessment and plan.    Fall Risk Assessment  Has the patient had two or more falls in the last year or any fall with injury in the last year?  No    Safety Assessment  Do you always wear your seatbelt?  Yes  Any changes to home needed to function safely? No  Difficulty hearing.  Yes  Patient counseled about all safety risks that were identified.    Functional Assessment ADLs  Are there any barriers preventing you from cooking for yourself or meeting nutritional needs?  No.    Are there any barriers preventing you from driving safely or obtaining transportation?  No.    Are there any barriers preventing you from using a telephone or calling for help?  No    Are there any barriers preventing you from shopping?  No.    Are there any barriers preventing you from taking care of your own finances?  No    Are there any barriers  preventing you from managing your medications?  No    Are there any barriers preventing you from showering, bathing or dressing yourself? No    Are there any barriers preventing you from doing housework or laundry? No  Are there any barriers preventing you from using the toilet?No  Are you currently engaging in any exercise or physical activity?  Yes. Walks 5 times daily, swims    Self-Assessment of Health  What is your perception of your health? Good    Do you sleep more than six hours a night? Yes    In the past 7 days, how much did pain keep you from doing your normal work? None    Do you spend quality time with family or friends (virtually or in person)? Yes    Do you usually eat a heart healthy diet that constists of a variety of fruits, vegetables, whole grains and fiber? Yes    Do you eat foods high in fat and/or Fast Food more than three times per week? No    How concerned are you that your medical conditions are not being well managed? a little    Are you worried that in the next 2 months, you may not have stable housing that you own, rent, or stay in as part of a household? No      Advance Care Planning  Do you have an Advance Directive, Living Will, Durable Power of , or POLST? Yes  Advance Directive       is on file      Health Maintenance Summary            Upcoming       Mammogram (Yearly) Next due on 3/26/2026      03/26/2025  MA-SCREENING MAMMO BILAT W/TOMOSYNTHESIS W/CAD    03/25/2024  MA-SCREENING MAMMO BILAT W/TOMOSYNTHESIS W/CAD    03/21/2023  MA-SCREENING MAMMO BILAT W/TOMOSYNTHESIS W/CAD    03/14/2022  MA-SCREENING MAMMO BILAT W/TOMOSYNTHESIS W/CAD    02/23/2021  MA-SCREENING MAMMO BILAT W/TOMOSYNTHESIS W/CAD     Only the first 5 history entries have been loaded, but more history exists.            Annual Wellness Visit (Yearly) Next due on 6/18/2026 06/18/2025  Level of Service: OK ANNUAL WELLNESS VISIT-INCLUDES PPPS SUBSEQUE*    04/16/2025  Level of Service: OK ANNUAL WELLNESS  VISIT-INCLUDES PPPS SUBSEQUE*    04/16/2025  Visit Dx: Medicare annual wellness visit, subsequent    03/19/2024  Level of Service: PREVENTIVE VISIT,EST,65 & OVER    03/18/2024  Level of Service: AR ANNUAL WELLNESS VISIT-INCLUDES PPPS SUBSEQUE*      Only the first 5 history entries have been loaded, but more history exists.              Bone Density Scan (Every 2 Years) Next due on 11/19/2026 11/19/2024  DS-BONE DENSITY STUDY (DEXA)    11/17/2022  DS-BONE DENSITY STUDY (DEXA)    11/16/2020  DS-BONE DENSITY STUDY (DEXA)    11/11/2015  DS-BONE DENSITY STUDY (DEXA)    07/14/2010  DS-BONE DENSITY STUDY (DEXA)      Only the first 5 history entries have been loaded, but more history exists.              Colorectal Cancer Screening (Colonoscopy - Preferred) Next due on 6/23/2027 06/23/2017  REFERRAL TO GI FOR COLONOSCOPY    09/08/2014  OCCULT BLOOD FECES IMMUNOASSAY    11/15/2007  AMB REFERRAL TO GI FOR COLONOSCOPY              IMM DTaP/Tdap/Td Vaccine (3 - Td or Tdap) Next due on 9/17/2034 09/17/2024  Imm Admin: Tdap Vaccine    09/02/2014  Imm Admin: Tdap Vaccine                      Completed or No Longer Recommended       Influenza Vaccine (Series Information) Completed      11/06/2024  Imm Admin: Influenza, unspecified formulation    10/20/2023  Imm Admin: Influenza Vaccine Adult HD    10/24/2022  Imm Admin: Influenza, Unspecified - HISTORICAL DATA    10/25/2021  Imm Admin: Influenza, Unspecified - HISTORICAL DATA    10/24/2020  Imm Admin: Influenza Vaccine Adult HD      Only the first 5 history entries have been loaded, but more history exists.              Zoster (Shingles) Vaccines (Series Information) Completed      12/11/2018  Imm Admin: Zoster Vaccine Recombinant (RZV) (SHINGRIX)    09/25/2018  Imm Admin: Zoster Vaccine Recombinant (RZV) (SHINGRIX)    05/11/2012  Imm Admin: Zoster Vaccine Live (ZVL) (Zostavax) - HISTORICAL DATA              COVID-19 Vaccine (Series Information) Completed       03/26/2025  Imm Admin: COVID-19, mRNA, LNP-S, PF, leann-sucrose, 30 mcg/0.3 mL    09/07/2024  Imm Admin: Covid-19 Mrna (Spikevax) Moderna 12+ Years    10/20/2023  Imm Admin: Comirnaty (Covid-19 Vaccine, Mrna, [id number] Formula)    05/06/2023  Imm Admin: PFIZER BIVALENT SARS-COV-2 VACCINE (12+)    09/17/2022  Imm Admin: PFIZER BIVALENT SARS-COV-2 VACCINE (12+)      Only the first 5 history entries have been loaded, but more history exists.              Hepatitis C Screening  Completed      06/02/2017  Hepatitis C Antibody component of HEP C VIRUS ANTIBODY    07/29/2013  Hepatitis C Antibody component of HEP C VIRUS ANTIBODY              Pneumococcal Vaccine: 50+ Years (Series Information) Completed      03/02/2017  Imm Admin: Pneumococcal Conjugate Vaccine (Prevnar/PCV-13)    09/02/2014  Imm Admin: Pneumococcal polysaccharide vaccine (PPSV-23)              Hepatitis A Vaccine (Hep A) (Series Information) Aged Out      No completion history exists for this topic.              HPV Vaccines (Series Information) Aged Out     No completion history exists for this topic.              Polio Vaccine (Inactivated Polio) (Series Information) Aged Out     No completion history exists for this topic.              Meningococcal Immunization (Series Information) Aged Out     No completion history exists for this topic.              Meningococcal B Vaccine (Series Information) Aged Out     No completion history exists for this topic.              Cervical Cancer Screening  Discontinued        Frequency changed to Never automatically (Topic No Longer Applies)    12/20/2016  THINPREP PAP WITH HPV              Hepatitis B Vaccine (Hep B)  Discontinued     No completion history exists for this topic.                            Patient Care Team:  Jo Cardona D.O. as PCP - General (Family Medicine)  Johnny You M.D. as Consulting Physician (Gastroenterology)  SANDOVAL Morales as Mid Level Provider (Dermatology)  Critsal  "Tiffany (Inactive)      Financial Resource Strain: Low Risk  (6/18/2025)    Overall Financial Resource Strain (CARDIA)     Difficulty of Paying Living Expenses: Not hard at all      Transportation Needs: No Transportation Needs (6/18/2025)    PRAPARE - Transportation     Lack of Transportation (Medical): No     Lack of Transportation (Non-Medical): No      Food Insecurity: No Food Insecurity (6/18/2025)    Hunger Vital Sign     Worried About Running Out of Food in the Last Year: Never true     Ran Out of Food in the Last Year: Never true        Encounter Vitals  Blood Pressure : 116/72  Pulse: 66  Pulse Oximetry: 95 %  Weight: 53.9 kg (118 lb 12.8 oz)  Height: 160 cm (5' 3\")  BMI (Calculated): 21.04  Pain Score: No pain     ROS:  No fever, chills, nausea, vomiting, diarrhea, chest pain or shortness of breath. See HPI.    Physical Exam:  Constitutional: NAD  HENMT: NC/AT, PERRLA, EOMI, OP clear, TM's clear, no lymphadenopathy, no thyromegaly.  No JVD.  Cardiovascular: RRR, No m/r/g  Lungs: CTAB, no w/r/r  Extremities: 2+ DP, PT and Radial pulses bilaterally. No c/c/e  Skin: No legions notes  Neurologic: Alert & oriented x3, CN II-XII grossly intact    Assessment and Plan. The following treatment and monitoring plan is recommended:    Migraine without aura and without status migrainosus, not intractable  Chronic, stable. The patient reports that that sumatriptan is very helpful at preventing her migraines. Follow-up at least annually.      Recurrent major depressive disorder, in full remission (HCC)  Chronic, stable.  PHQ-9 score today is 0. The patient reports stable most of the time.  She denies SI/HI. Not currently in counseling.   No current treatment.  Follow-up at least annually.      Osteopenia of neck of left femur  Chronic, stable. The patient denies any recent falls of fractures. Discussed adding weight bearing exercise such as walking for 30 minutes most of the days of the week.  Also discussed adequate " calcium and vitamin D ingestion and optimal diet.  Follow up at least annually.       Hyperlipidemia   Latest Reference Range & Units 04/09/25 06:48   Cholesterol,Tot 100 - 199 mg/dL 194   Triglycerides 0 - 149 mg/dL 58   HDL >=40 mg/dL 86   LDL <100 mg/dL 96       Chronic, stable. The patient denies any side effects from the current medication. Continue with current defined treatment plan: atorvastatin (LIPITOR) 20 MG Tab . Follow-up at least annually.      Postoperative hypothyroidism  Chronic, stable. The patient denies any symptoms at this time. Continue with current defined treatment plan: levothyroxine (SYNTHROID) 75 MCG Tab . Follow-up at least annually.      Vitamin D insufficiency  Chronic, stable. Continue with current defined treatment plan: CALCIUM-VITAMIN D PO. Follow-up at least annually.      Services suggested: No services needed at this time  Health Care Screening: Age-appropriate preventive services recommended by USPTF and ACIP covered by Medicare were discussed today. Services ordered if indicated and agreed upon by the patient.  Referrals offered: Community-based lifestyle interventions to reduce health risks and promote self-management and wellness, fall prevention, nutrition, physical activity, tobacco-use cessation, weight loss, and mental health services as per orders if indicated.    Discussion today about general wellness and lifestyle habits:    Prevent falls and reduce trip hazards; Cautioned about securing or removing rugs.  Have a working fire alarm and carbon monoxide detector.  Engage in regular physical activity and social activities.    Follow-up: Return for appointment with Primary Care Provider as needed.              [1]   Current Outpatient Medications   Medication Sig Dispense Refill    multivitamin Tab Take 1 Tablet by mouth every day.      Coenzyme Q10 (COQ10 PO) Take  by mouth.      atorvastatin (LIPITOR) 20 MG Tab TAKE ONE TABLET BY MOUTH EVERY EVENING 100 Tablet 3     SUMAtriptan (IMITREX) 50 MG Tab Take 1 Tablet by mouth 1 time a day as needed for Migraine (may repeat after 2 hours, max 100mg/day). 9 Tablet 3    aspirin 81 MG EC tablet Take 81 mg by mouth every day.      levothyroxine (SYNTHROID) 75 MCG Tab Take 1 Tablet by mouth every morning on an empty stomach. 90 Tablet 3    Ascorbic Acid (VITAMIN C PO) Take  by mouth.      VITAMIN D PO Take 2,000 Int'l Units/day by mouth every day.      CALCIUM-VITAMIN D PO Take  by mouth.      Cyanocobalamin (VITAMIN B12 PO) Take  by mouth.      MAGNESIUM PO Take  by mouth.      POTASSIUM PO Take  by mouth.      ibuprofen (MOTRIN) 200 MG Tab Take 200 mg by mouth 1 time a day as needed.      Psyllium (METAMUCIL PO) Take  by mouth every day.       No current facility-administered medications for this visit.   [2]   Social History  Tobacco Use    Smoking status: Former     Current packs/day: 0.00     Average packs/day: 0.5 packs/day for 16.0 years (8.0 ttl pk-yrs)     Types: Cigarettes     Start date: 1968     Quit date: 1984     Years since quittin.0    Smokeless tobacco: Never   Vaping Use    Vaping status: Never Used   Substance Use Topics    Alcohol use: Yes     Alcohol/week: 4.2 oz     Types: 4 Glasses of wine, 3 Cans of beer per week     Comment: 1 glass of beer/wine a day    Drug use: No   [3]   Past Surgical History:  Procedure Laterality Date    PB SHLDR ARTHROSCOP,SURG,W/ROTAT CUFF REPB Right 2021    Procedure: RIGHT SHOULDER ARTHROSCOPY DEBRIDEMENT, RIGHT ROTATOR CUFF REPAIR, RIGHT SUBACROMIAL DECOMPRESSION, RIGHT DISTAL CLAVICLE EXCISION, REPAIRS AS INDICATED;  Surgeon: Vish Garcia M.D.;  Location: [facility];  Service: Orthopedics    HEMORRHOIDECTOMY  2017    Procedure: HEMORRHOIDECTOMY- ARTERY LIGATION, RECTAL ANO REPAIR;  Surgeon: Aleksandr Quinteros M.D.;  Location: [facility];  Service: General    ABDOMINAL HYSTERECTOMY TOTAL      Fibroids //     DILATION AND  CURETTAGE          OTHER      hemrroidectomy    OTHER ORTHOPEDIC SURGERY      surgery to big toe    THYROIDECTOMY      partial

## 2025-06-18 NOTE — ASSESSMENT & PLAN NOTE
Chronic, stable. Continue with current defined treatment plan: CALCIUM-VITAMIN D PO. Follow-up at least annually.

## 2025-06-18 NOTE — ASSESSMENT & PLAN NOTE
Chronic, stable.  PHQ-9 score today is 0. The patient reports stable most of the time.  She denies SI/HI. Not currently in counseling.   No current treatment.  Follow-up at least annually.

## 2025-07-24 DIAGNOSIS — E89.0 POSTOPERATIVE HYPOTHYROIDISM: ICD-10-CM

## 2025-07-24 RX ORDER — LEVOTHYROXINE SODIUM 75 UG/1
75 TABLET ORAL
Qty: 100 TABLET | Refills: 3 | Status: SHIPPED | OUTPATIENT
Start: 2025-07-24

## 2025-07-24 NOTE — TELEPHONE ENCOUNTER
Received request via: Pharmacy    Was the patient seen in the last year in this department? Yes    Does the patient have an active prescription (recently filled or refills available) for medication(s) requested? No    Pharmacy Name: [id number] - [place], [place] - [address]     Does the patient have USP Plus and need 100-day supply? (This applies to ALL medications) Yes, quantity updated to 100 days

## (undated) DEVICE — GOWN SURGEONS X-LARGE - DISP. (30/CA)

## (undated) DEVICE — PACK MINOR BASIN - (2EA/CA)

## (undated) DEVICE — SUTURE 0 VICRYL PLUS UR-6 - 27 INCH (36/BX)

## (undated) DEVICE — GLOVE BIOGEL INDICATOR SZ 7SURGICAL PF LTX - (50/BX 4BX/CA)

## (undated) DEVICE — MASK AIRWAY SIZE 3 UNIQUE SILICON (10/BX)

## (undated) DEVICE — SUTURE GENERAL

## (undated) DEVICE — JELLY, KY 2 0Z STERILE

## (undated) DEVICE — GOWN WARMING STANDARD FLEX - (30/CA)

## (undated) DEVICE — SUTURE 3-0 CHROMIC GUT SH 27 (36PK/BX)"

## (undated) DEVICE — KIT ANESTHESIA W/CIRCUIT & 3/LT BAG W/FILTER (20EA/CA)

## (undated) DEVICE — SLEEVE, VASO, THIGH, MED

## (undated) DEVICE — ELECTRODE DUAL RETURN W/ CORD - (50/PK)

## (undated) DEVICE — Device

## (undated) DEVICE — SODIUM CHL IRRIGATION 0.9% 1000ML (12EA/CA)

## (undated) DEVICE — SENSOR SPO2 NEO LNCS ADHESIVE (20/BX) SEE USER NOTES

## (undated) DEVICE — TRAY SRGPRP PVP IOD WT PRP - (20/CA)

## (undated) DEVICE — DRAPE UNDER BUTTOCKS FLUID - (20/CA)

## (undated) DEVICE — CANISTER SUCTION 3000ML MECHANICAL FILTER AUTO SHUTOFF MEDI-VAC NONSTERILE LF DISP  (40EA/CA)

## (undated) DEVICE — TUBING CLEARLINK DUO-VENT - C-FLO (48EA/CA)

## (undated) DEVICE — ELECTRODE 850 FOAM ADHESIVE - HYDROGEL RADIOTRNSPRNT (50/PK)

## (undated) DEVICE — HEAD HOLDER JUNIOR/ADULT

## (undated) DEVICE — KIT ROOM DECONTAMINATION

## (undated) DEVICE — LEGGING LITHOTOMY 31 X 48 IN - (2EA/PK 20PK/CA)

## (undated) DEVICE — GLOVE BIOGEL SZ 6.5 SURGICAL PF LTX (50PR/BX 4BX/CA)

## (undated) DEVICE — GLOVE BIOGEL PI INDICATOR SZ 7.0 SURGICAL PF LF - (50/BX 4BX/CA)

## (undated) DEVICE — SET LEADWIRE 5 LEAD BEDSIDE DISPOSABLE ECG (1SET OF 5/EA)

## (undated) DEVICE — SUCTION INSTRUMENT YANKAUER BULBOUS TIP W/O VENT (50EA/CA)

## (undated) DEVICE — LACTATED RINGERS INJ 1000 ML - (14EA/CA 60CA/PF)

## (undated) DEVICE — HEMOSTAT SURG ABSORBABLE - 4 X 8 IN SURGICEL (24EA/CA)

## (undated) DEVICE — PROTECTOR ULNA NERVE - (36PR/CA)

## (undated) DEVICE — DRAPE LARGE 3 QUARTER - (20/CA)

## (undated) DEVICE — SET EXTENSION WITH 2 PORTS (48EA/CA) ***PART #2C8610 IS A SUBSTITUTE*****

## (undated) DEVICE — GLOVE BIOGEL PI ORTHO SZ 7 PF LF (40PR/BX)

## (undated) DEVICE — GLOVE BIOGEL SZ 7 SURGICAL PF LTX - (50PR/BX 4BX/CA)

## (undated) DEVICE — MASK ANESTHESIA ADULT  - (100/CA)